# Patient Record
Sex: FEMALE | Race: WHITE | NOT HISPANIC OR LATINO | Employment: OTHER | URBAN - METROPOLITAN AREA
[De-identification: names, ages, dates, MRNs, and addresses within clinical notes are randomized per-mention and may not be internally consistent; named-entity substitution may affect disease eponyms.]

---

## 2020-07-21 ENCOUNTER — TRANSCRIBE ORDERS (OUTPATIENT)
Dept: ADMINISTRATIVE | Facility: HOSPITAL | Age: 70
End: 2020-07-21

## 2020-07-21 ENCOUNTER — APPOINTMENT (OUTPATIENT)
Dept: LAB | Facility: MEDICAL CENTER | Age: 70
End: 2020-07-21
Payer: MEDICARE

## 2020-07-21 DIAGNOSIS — R35.1 NOCTURIA: Primary | ICD-10-CM

## 2020-07-21 LAB
BACTERIA UR QL AUTO: ABNORMAL /HPF
BILIRUB UR QL STRIP: NEGATIVE
CLARITY UR: CLEAR
COLOR UR: YELLOW
GLUCOSE UR STRIP-MCNC: NEGATIVE MG/DL
HGB UR QL STRIP.AUTO: NEGATIVE
KETONES UR STRIP-MCNC: NEGATIVE MG/DL
LEUKOCYTE ESTERASE UR QL STRIP: NEGATIVE
MUCOUS THREADS UR QL AUTO: ABNORMAL
NITRITE UR QL STRIP: NEGATIVE
NON-SQ EPI CELLS URNS QL MICRO: ABNORMAL /HPF
PH UR STRIP.AUTO: 5 [PH]
PROT UR STRIP-MCNC: ABNORMAL MG/DL
RBC #/AREA URNS AUTO: ABNORMAL /HPF
SP GR UR STRIP.AUTO: 1.01 (ref 1–1.04)
UROBILINOGEN UA: NEGATIVE MG/DL
WBC #/AREA URNS AUTO: ABNORMAL /HPF

## 2020-07-21 PROCEDURE — 87086 URINE CULTURE/COLONY COUNT: CPT | Performed by: INTERNAL MEDICINE

## 2020-07-21 PROCEDURE — 81001 URINALYSIS AUTO W/SCOPE: CPT

## 2020-07-22 LAB — BACTERIA UR CULT: NORMAL

## 2021-02-26 ENCOUNTER — IMMUNIZATIONS (OUTPATIENT)
Dept: FAMILY MEDICINE CLINIC | Facility: HOSPITAL | Age: 71
End: 2021-02-26

## 2021-02-26 DIAGNOSIS — Z23 ENCOUNTER FOR IMMUNIZATION: Primary | ICD-10-CM

## 2021-02-26 PROCEDURE — 0001A SARS-COV-2 / COVID-19 MRNA VACCINE (PFIZER-BIONTECH) 30 MCG: CPT

## 2021-02-26 PROCEDURE — 91300 SARS-COV-2 / COVID-19 MRNA VACCINE (PFIZER-BIONTECH) 30 MCG: CPT

## 2021-03-17 ENCOUNTER — IMMUNIZATIONS (OUTPATIENT)
Dept: FAMILY MEDICINE CLINIC | Facility: HOSPITAL | Age: 71
End: 2021-03-17

## 2021-03-17 DIAGNOSIS — Z23 ENCOUNTER FOR IMMUNIZATION: Primary | ICD-10-CM

## 2021-03-17 PROCEDURE — 91300 SARS-COV-2 / COVID-19 MRNA VACCINE (PFIZER-BIONTECH) 30 MCG: CPT

## 2021-03-17 PROCEDURE — 0002A SARS-COV-2 / COVID-19 MRNA VACCINE (PFIZER-BIONTECH) 30 MCG: CPT

## 2022-01-10 ENCOUNTER — NURSE TRIAGE (OUTPATIENT)
Dept: OTHER | Facility: OTHER | Age: 72
End: 2022-01-10

## 2022-01-10 NOTE — TELEPHONE ENCOUNTER
Regarding: DId covid test 3 weeks ago it was negative started having a sore throat and a ear ache   ----- Message from Yomi Santana sent at 1/8/2022  8:19 PM EST -----  '' I did a covid tested 3 weeks ago and it was negative on thursday I started to have a sore throat and a ear ache should I get a new covid test or should I schedule an appointment to see my doctor ''

## 2022-01-10 NOTE — TELEPHONE ENCOUNTER
Reason for Disposition   Message left on unidentified voice mail  Phone number verified  Protocols used: NO CONTACT OR DUPLICATE CONTACT CALL-ADULT-[place]  Left voice message on machine to return my call to Health Call at [phone] option #7 if assistance is still needed

## 2023-08-28 ENCOUNTER — NEW PATIENT (OUTPATIENT)
Dept: URBAN - METROPOLITAN AREA CLINIC 6 | Facility: CLINIC | Age: 73
End: 2023-08-28

## 2023-08-28 DIAGNOSIS — E11.9: ICD-10-CM

## 2023-08-28 DIAGNOSIS — H25.813: ICD-10-CM

## 2023-08-28 DIAGNOSIS — H02.884: ICD-10-CM

## 2023-08-28 DIAGNOSIS — H02.881: ICD-10-CM

## 2023-08-28 PROCEDURE — 99204 OFFICE O/P NEW MOD 45 MIN: CPT

## 2023-08-28 ASSESSMENT — VISUAL ACUITY
OD_CC: 20/200+1
OU_CC: J10
OS_CC: CF 4FT

## 2023-08-28 ASSESSMENT — TONOMETRY
OD_IOP_MMHG: 17
OS_IOP_MMHG: 15

## 2023-09-11 ENCOUNTER — PRE-OP CATARACT MEASUREMENTS (OUTPATIENT)
Dept: URBAN - METROPOLITAN AREA CLINIC 6 | Facility: CLINIC | Age: 73
End: 2023-09-11

## 2023-09-11 DIAGNOSIS — H02.884: ICD-10-CM

## 2023-09-11 DIAGNOSIS — E11.9: ICD-10-CM

## 2023-09-11 DIAGNOSIS — H02.881: ICD-10-CM

## 2023-09-11 DIAGNOSIS — H25.813: ICD-10-CM

## 2023-09-11 PROCEDURE — 92136 OPHTHALMIC BIOMETRY: CPT

## 2023-09-11 PROCEDURE — 92012 INTRM OPH EXAM EST PATIENT: CPT

## 2023-09-11 ASSESSMENT — VISUAL ACUITY
OD_SC: 20/150
OS_SC: 20/200

## 2023-09-11 ASSESSMENT — TONOMETRY
OS_IOP_MMHG: 12
OD_IOP_MMHG: 16

## 2023-09-28 ENCOUNTER — SURGERY/PROCEDURE (OUTPATIENT)
Dept: URBAN - METROPOLITAN AREA SURGICAL CENTER 6 | Facility: SURGICAL CENTER | Age: 73
End: 2023-09-28

## 2023-09-28 DIAGNOSIS — H25.812: ICD-10-CM

## 2023-09-28 PROCEDURE — 66984 XCAPSL CTRC RMVL W/O ECP: CPT

## 2023-09-29 ENCOUNTER — 1 DAY POST-OP (OUTPATIENT)
Dept: URBAN - METROPOLITAN AREA CLINIC 6 | Facility: CLINIC | Age: 73
End: 2023-09-29

## 2023-09-29 DIAGNOSIS — Z96.1: ICD-10-CM

## 2023-09-29 PROCEDURE — 99024 POSTOP FOLLOW-UP VISIT: CPT

## 2023-09-29 ASSESSMENT — VISUAL ACUITY
OD_SC: 20/200
OS_SC: 20/200+1

## 2023-09-29 ASSESSMENT — TONOMETRY
OS_IOP_MMHG: 14
OD_IOP_MMHG: 14

## 2023-10-04 ENCOUNTER — 1 WEEK POST-OP (OUTPATIENT)
Dept: URBAN - METROPOLITAN AREA CLINIC 6 | Facility: CLINIC | Age: 73
End: 2023-10-04

## 2023-10-04 DIAGNOSIS — H25.811: ICD-10-CM

## 2023-10-04 DIAGNOSIS — Z96.1: ICD-10-CM

## 2023-10-04 PROCEDURE — 92136 OPHTHALMIC BIOMETRY: CPT | Mod: 26,RT

## 2023-10-04 PROCEDURE — 99024 POSTOP FOLLOW-UP VISIT: CPT

## 2023-10-04 ASSESSMENT — VISUAL ACUITY
OS_SC: 20/40-2
OD_PH: 20/150
OD_SC: 20/250

## 2023-10-17 ENCOUNTER — SURGERY/PROCEDURE (OUTPATIENT)
Dept: URBAN - METROPOLITAN AREA SURGICAL CENTER 6 | Facility: SURGICAL CENTER | Age: 73
End: 2023-10-17

## 2023-10-17 DIAGNOSIS — H25.811: ICD-10-CM

## 2023-10-17 PROCEDURE — 66984 XCAPSL CTRC RMVL W/O ECP: CPT | Mod: 79,RT

## 2023-10-18 ENCOUNTER — 1 DAY POST-OP (OUTPATIENT)
Dept: URBAN - METROPOLITAN AREA CLINIC 6 | Facility: CLINIC | Age: 73
End: 2023-10-18

## 2023-10-18 DIAGNOSIS — Z96.1: ICD-10-CM

## 2023-10-18 PROCEDURE — 99024 POSTOP FOLLOW-UP VISIT: CPT

## 2023-10-18 ASSESSMENT — TONOMETRY
OD_IOP_MMHG: 22
OS_IOP_MMHG: 13

## 2023-10-18 ASSESSMENT — VISUAL ACUITY
OS_SC: 20/40+2
OS_PH: 20/30
OD_SC: 20/25-1

## 2023-10-24 ENCOUNTER — 1 WEEK POST-OP (OUTPATIENT)
Dept: URBAN - METROPOLITAN AREA CLINIC 6 | Facility: CLINIC | Age: 73
End: 2023-10-24

## 2023-10-24 DIAGNOSIS — Z96.1: ICD-10-CM

## 2023-10-24 PROCEDURE — 99024 POSTOP FOLLOW-UP VISIT: CPT

## 2023-10-24 ASSESSMENT — TONOMETRY
OD_IOP_MMHG: 14
OS_IOP_MMHG: 10

## 2023-10-24 ASSESSMENT — VISUAL ACUITY
OS_PH: 20/30
OS_SC: 20/40
OD_SC: 20/25

## 2024-09-23 ENCOUNTER — HOSPITAL ENCOUNTER (INPATIENT)
Facility: HOSPITAL | Age: 74
LOS: 4 days | Discharge: NURSING, REHAB OR LONG-TERM CARE | DRG: 481 | End: 2024-09-27
Attending: EMERGENCY MEDICINE | Admitting: INTERNAL MEDICINE
Payer: COMMERCIAL

## 2024-09-23 ENCOUNTER — APPOINTMENT (EMERGENCY)
Dept: RADIOLOGY | Facility: HOSPITAL | Age: 74
DRG: 481 | End: 2024-09-23
Payer: COMMERCIAL
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
## 2024-09-23 DIAGNOSIS — S72.009A HIP FRACTURE (HCC): Primary | ICD-10-CM

## 2024-09-23 DIAGNOSIS — N17.9 AKI (ACUTE KIDNEY INJURY) (HCC): ICD-10-CM

## 2024-09-23 PROBLEM — E78.2 MIXED HYPERLIPIDEMIA: Status: ACTIVE | Noted: 2024-09-23

## 2024-09-23 PROBLEM — S72.001A FRACTURE OF NECK OF RIGHT FEMUR (HCC): Status: ACTIVE | Noted: 2024-09-23

## 2024-09-23 PROBLEM — E11.9 TYPE 2 DIABETES MELLITUS, WITHOUT LONG-TERM CURRENT USE OF INSULIN (HCC): Status: ACTIVE | Noted: 2024-09-23

## 2024-09-23 PROBLEM — E66.812 CLASS 2 OBESITY DUE TO EXCESS CALORIES IN ADULT: Status: ACTIVE | Noted: 2024-09-23

## 2024-09-23 PROBLEM — N18.4 STAGE 4 CHRONIC KIDNEY DISEASE (HCC): Status: ACTIVE | Noted: 2024-09-23

## 2024-09-23 PROBLEM — I10 PRIMARY HYPERTENSION: Status: ACTIVE | Noted: 2024-09-23

## 2024-09-23 PROBLEM — E66.09 CLASS 2 OBESITY DUE TO EXCESS CALORIES IN ADULT: Status: ACTIVE | Noted: 2024-09-23

## 2024-09-23 LAB
ABO GROUP BLD: NORMAL
ANION GAP SERPL CALCULATED.3IONS-SCNC: 8 MMOL/L (ref 4–13)
APTT PPP: 28 SECONDS (ref 23–34)
BASOPHILS # BLD AUTO: 0.03 THOUSANDS/ΜL (ref 0–0.1)
BASOPHILS NFR BLD AUTO: 0 % (ref 0–1)
BLD GP AB SCN SERPL QL: NEGATIVE
BUN SERPL-MCNC: 43 MG/DL (ref 5–25)
CALCIUM SERPL-MCNC: 9.7 MG/DL (ref 8.4–10.2)
CHLORIDE SERPL-SCNC: 108 MMOL/L (ref 96–108)
CO2 SERPL-SCNC: 21 MMOL/L (ref 21–32)
CREAT SERPL-MCNC: 1.79 MG/DL (ref 0.6–1.3)
EOSINOPHIL # BLD AUTO: 0.15 THOUSAND/ΜL (ref 0–0.61)
EOSINOPHIL NFR BLD AUTO: 1 % (ref 0–6)
ERYTHROCYTE [DISTWIDTH] IN BLOOD BY AUTOMATED COUNT: 14.1 % (ref 11.6–15.1)
GFR SERPL CREATININE-BSD FRML MDRD: 27 ML/MIN/1.73SQ M
GLUCOSE SERPL-MCNC: 128 MG/DL (ref 65–140)
HCT VFR BLD AUTO: 34.1 % (ref 34.8–46.1)
HGB BLD-MCNC: 10.8 G/DL (ref 11.5–15.4)
IMM GRANULOCYTES # BLD AUTO: 0.04 THOUSAND/UL (ref 0–0.2)
IMM GRANULOCYTES NFR BLD AUTO: 0 % (ref 0–2)
INR PPP: 1.15 (ref 0.85–1.19)
LYMPHOCYTES # BLD AUTO: 1.49 THOUSANDS/ΜL (ref 0.6–4.47)
LYMPHOCYTES NFR BLD AUTO: 13 % (ref 14–44)
MCH RBC QN AUTO: 29.8 PG (ref 26.8–34.3)
MCHC RBC AUTO-ENTMCNC: 31.7 G/DL (ref 31.4–37.4)
MCV RBC AUTO: 94 FL (ref 82–98)
MONOCYTES # BLD AUTO: 0.61 THOUSAND/ΜL (ref 0.17–1.22)
MONOCYTES NFR BLD AUTO: 6 % (ref 4–12)
NEUTROPHILS # BLD AUTO: 8.77 THOUSANDS/ΜL (ref 1.85–7.62)
NEUTS SEG NFR BLD AUTO: 80 % (ref 43–75)
NRBC BLD AUTO-RTO: 0 /100 WBCS
PLATELET # BLD AUTO: 237 THOUSANDS/UL (ref 149–390)
PMV BLD AUTO: 9.4 FL (ref 8.9–12.7)
POTASSIUM SERPL-SCNC: 5 MMOL/L (ref 3.5–5.3)
PROTHROMBIN TIME: 15.1 SECONDS (ref 12.3–15)
RBC # BLD AUTO: 3.63 MILLION/UL (ref 3.81–5.12)
RH BLD: POSITIVE
SODIUM SERPL-SCNC: 137 MMOL/L (ref 135–147)
SPECIMEN EXPIRATION DATE: NORMAL
TSH SERPL DL<=0.05 MIU/L-ACNC: 5.63 UIU/ML (ref 0.45–4.5)
WBC # BLD AUTO: 11.09 THOUSAND/UL (ref 4.31–10.16)

## 2024-09-23 PROCEDURE — 86850 RBC ANTIBODY SCREEN: CPT | Performed by: EMERGENCY MEDICINE

## 2024-09-23 PROCEDURE — 86900 BLOOD TYPING SEROLOGIC ABO: CPT | Performed by: EMERGENCY MEDICINE

## 2024-09-23 PROCEDURE — 36415 COLL VENOUS BLD VENIPUNCTURE: CPT | Performed by: EMERGENCY MEDICINE

## 2024-09-23 PROCEDURE — 73552 X-RAY EXAM OF FEMUR 2/>: CPT

## 2024-09-23 PROCEDURE — 85730 THROMBOPLASTIN TIME PARTIAL: CPT | Performed by: EMERGENCY MEDICINE

## 2024-09-23 PROCEDURE — 83036 HEMOGLOBIN GLYCOSYLATED A1C: CPT | Performed by: INTERNAL MEDICINE

## 2024-09-23 PROCEDURE — 99284 EMERGENCY DEPT VISIT MOD MDM: CPT

## 2024-09-23 PROCEDURE — 99285 EMERGENCY DEPT VISIT HI MDM: CPT | Performed by: EMERGENCY MEDICINE

## 2024-09-23 PROCEDURE — 86901 BLOOD TYPING SEROLOGIC RH(D): CPT | Performed by: EMERGENCY MEDICINE

## 2024-09-23 PROCEDURE — 85025 COMPLETE CBC W/AUTO DIFF WBC: CPT | Performed by: EMERGENCY MEDICINE

## 2024-09-23 PROCEDURE — 73502 X-RAY EXAM HIP UNI 2-3 VIEWS: CPT

## 2024-09-23 PROCEDURE — 99222 1ST HOSP IP/OBS MODERATE 55: CPT | Performed by: INTERNAL MEDICINE

## 2024-09-23 PROCEDURE — 71045 X-RAY EXAM CHEST 1 VIEW: CPT

## 2024-09-23 PROCEDURE — 93005 ELECTROCARDIOGRAM TRACING: CPT

## 2024-09-23 PROCEDURE — 85610 PROTHROMBIN TIME: CPT | Performed by: EMERGENCY MEDICINE

## 2024-09-23 PROCEDURE — 84443 ASSAY THYROID STIM HORMONE: CPT | Performed by: INTERNAL MEDICINE

## 2024-09-23 PROCEDURE — 80048 BASIC METABOLIC PNL TOTAL CA: CPT | Performed by: EMERGENCY MEDICINE

## 2024-09-23 RX ORDER — OXYCODONE HYDROCHLORIDE 5 MG/1
5 TABLET ORAL ONCE
Status: DISCONTINUED | OUTPATIENT
Start: 2024-09-23 | End: 2024-09-24

## 2024-09-23 RX ORDER — HYDROMORPHONE HCL IN WATER/PF 6 MG/30 ML
0.2 PATIENT CONTROLLED ANALGESIA SYRINGE INTRAVENOUS
Status: DISCONTINUED | OUTPATIENT
Start: 2024-09-23 | End: 2024-09-27 | Stop reason: HOSPADM

## 2024-09-23 RX ORDER — OXYCODONE HYDROCHLORIDE 5 MG/1
5 TABLET ORAL EVERY 4 HOURS PRN
Status: DISCONTINUED | OUTPATIENT
Start: 2024-09-23 | End: 2024-09-27 | Stop reason: HOSPADM

## 2024-09-23 RX ORDER — HEPARIN SODIUM 5000 [USP'U]/ML
5000 INJECTION, SOLUTION INTRAVENOUS; SUBCUTANEOUS EVERY 8 HOURS SCHEDULED
Status: DISCONTINUED | OUTPATIENT
Start: 2024-09-23 | End: 2024-09-27 | Stop reason: HOSPADM

## 2024-09-23 RX ORDER — OXYCODONE AND ACETAMINOPHEN 5; 325 MG/1; MG/1
1 TABLET ORAL ONCE
Status: DISCONTINUED | OUTPATIENT
Start: 2024-09-23 | End: 2024-09-23

## 2024-09-23 RX ORDER — DOCUSATE SODIUM 100 MG/1
100 CAPSULE, LIQUID FILLED ORAL 2 TIMES DAILY
Status: DISCONTINUED | OUTPATIENT
Start: 2024-09-24 | End: 2024-09-25

## 2024-09-23 RX ORDER — METHOCARBAMOL 500 MG/1
500 TABLET, FILM COATED ORAL ONCE
Status: COMPLETED | OUTPATIENT
Start: 2024-09-23 | End: 2024-09-23

## 2024-09-23 RX ORDER — ACETAMINOPHEN 325 MG/1
650 TABLET ORAL EVERY 6 HOURS PRN
Status: DISCONTINUED | OUTPATIENT
Start: 2024-09-23 | End: 2024-09-27 | Stop reason: HOSPADM

## 2024-09-23 RX ORDER — INSULIN LISPRO 100 [IU]/ML
1-5 INJECTION, SOLUTION INTRAVENOUS; SUBCUTANEOUS
Status: DISCONTINUED | OUTPATIENT
Start: 2024-09-24 | End: 2024-09-24

## 2024-09-23 RX ORDER — LIDOCAINE 50 MG/G
1 PATCH TOPICAL ONCE
Status: COMPLETED | OUTPATIENT
Start: 2024-09-23 | End: 2024-09-24

## 2024-09-23 RX ORDER — INSULIN LISPRO 100 [IU]/ML
1-5 INJECTION, SOLUTION INTRAVENOUS; SUBCUTANEOUS
Status: DISCONTINUED | OUTPATIENT
Start: 2024-09-23 | End: 2024-09-27 | Stop reason: HOSPADM

## 2024-09-23 RX ORDER — TRANEXAMIC ACID 100 MG/ML
INJECTION, SOLUTION INTRAVENOUS
Status: DISPENSED
Start: 2024-09-23 | End: 2024-09-24

## 2024-09-23 RX ORDER — AMLODIPINE BESYLATE 5 MG/1
5 TABLET ORAL DAILY
Status: DISCONTINUED | OUTPATIENT
Start: 2024-09-24 | End: 2024-09-26

## 2024-09-23 RX ORDER — ONDANSETRON 2 MG/ML
4 INJECTION INTRAMUSCULAR; INTRAVENOUS EVERY 6 HOURS PRN
Status: DISCONTINUED | OUTPATIENT
Start: 2024-09-23 | End: 2024-09-27 | Stop reason: HOSPADM

## 2024-09-23 RX ADMIN — METHOCARBAMOL TABLETS 500 MG: 500 TABLET, COATED ORAL at 22:11

## 2024-09-23 RX ADMIN — LIDOCAINE 1 PATCH: 50 PATCH CUTANEOUS at 22:12

## 2024-09-23 RX ADMIN — MORPHINE SULFATE 2 MG: 2 INJECTION, SOLUTION INTRAMUSCULAR; INTRAVENOUS at 23:00

## 2024-09-23 RX ADMIN — TRANEXAMIC ACID 690 MG: 1 INJECTION, SOLUTION INTRAVENOUS at 23:47

## 2024-09-23 NOTE — Clinical Note
Case was discussed with dr jain and   and the patient's admission status was agreed to be Admission Status: inpatient status to the service of Dr. Hill .

## 2024-09-24 ENCOUNTER — APPOINTMENT (INPATIENT)
Dept: RADIOLOGY | Facility: HOSPITAL | Age: 74
DRG: 481 | End: 2024-09-24
Payer: COMMERCIAL

## 2024-09-24 ENCOUNTER — ANESTHESIA (INPATIENT)
Dept: PERIOP | Facility: HOSPITAL | Age: 74
DRG: 481 | End: 2024-09-24
Payer: COMMERCIAL

## 2024-09-24 ENCOUNTER — ANESTHESIA EVENT (INPATIENT)
Dept: PERIOP | Facility: HOSPITAL | Age: 74
DRG: 481 | End: 2024-09-24
Payer: COMMERCIAL

## 2024-09-24 PROBLEM — M20.12 ACQUIRED HALLUX VALGUS OF BOTH FEET: Status: ACTIVE | Noted: 2020-09-08

## 2024-09-24 PROBLEM — M21.40 PES PLANUS: Status: ACTIVE | Noted: 2020-09-08

## 2024-09-24 PROBLEM — M19.072: Status: ACTIVE | Noted: 2020-09-08

## 2024-09-24 PROBLEM — M20.11 ACQUIRED HALLUX VALGUS OF BOTH FEET: Status: ACTIVE | Noted: 2020-09-08

## 2024-09-24 PROBLEM — I87.2 VENOUS INSUFFICIENCY: Status: ACTIVE | Noted: 2020-09-08

## 2024-09-24 PROBLEM — N39.3 STRESS INCONTINENCE OF URINE: Status: ACTIVE | Noted: 2023-07-12

## 2024-09-24 PROBLEM — G72.0 STATIN MYOPATHY: Status: ACTIVE | Noted: 2024-04-29

## 2024-09-24 PROBLEM — E87.5 HYPERKALEMIA: Chronic | Status: ACTIVE | Noted: 2022-12-07

## 2024-09-24 PROBLEM — T46.6X5A STATIN MYOPATHY: Status: ACTIVE | Noted: 2024-04-29

## 2024-09-24 LAB
ABO GROUP BLD: NORMAL
ALBUMIN SERPL BCG-MCNC: 3.6 G/DL (ref 3.5–5)
ALP SERPL-CCNC: 74 U/L (ref 34–104)
ALT SERPL W P-5'-P-CCNC: 20 U/L (ref 7–52)
ANION GAP SERPL CALCULATED.3IONS-SCNC: 8 MMOL/L (ref 4–13)
AST SERPL W P-5'-P-CCNC: 35 U/L (ref 13–39)
ATRIAL RATE: 76 BPM
BILIRUB SERPL-MCNC: 0.3 MG/DL (ref 0.2–1)
BUN SERPL-MCNC: 43 MG/DL (ref 5–25)
CALCIUM SERPL-MCNC: 9.5 MG/DL (ref 8.4–10.2)
CHLORIDE SERPL-SCNC: 110 MMOL/L (ref 96–108)
CO2 SERPL-SCNC: 20 MMOL/L (ref 21–32)
CREAT SERPL-MCNC: 1.87 MG/DL (ref 0.6–1.3)
ERYTHROCYTE [DISTWIDTH] IN BLOOD BY AUTOMATED COUNT: 14.2 % (ref 11.6–15.1)
EST. AVERAGE GLUCOSE BLD GHB EST-MCNC: 148 MG/DL
GFR SERPL CREATININE-BSD FRML MDRD: 25 ML/MIN/1.73SQ M
GLUCOSE SERPL-MCNC: 134 MG/DL (ref 65–140)
GLUCOSE SERPL-MCNC: 141 MG/DL (ref 65–140)
GLUCOSE SERPL-MCNC: 197 MG/DL (ref 65–140)
GLUCOSE SERPL-MCNC: 85 MG/DL (ref 65–140)
GLUCOSE SERPL-MCNC: 91 MG/DL (ref 65–140)
GLUCOSE SERPL-MCNC: 92 MG/DL (ref 65–140)
HBA1C MFR BLD: 6.8 %
HCT VFR BLD AUTO: 31.4 % (ref 34.8–46.1)
HGB BLD-MCNC: 9.7 G/DL (ref 11.5–15.4)
MAGNESIUM SERPL-MCNC: 2 MG/DL (ref 1.9–2.7)
MCH RBC QN AUTO: 29.9 PG (ref 26.8–34.3)
MCHC RBC AUTO-ENTMCNC: 30.9 G/DL (ref 31.4–37.4)
MCV RBC AUTO: 97 FL (ref 82–98)
P AXIS: 48 DEGREES
PLATELET # BLD AUTO: 218 THOUSANDS/UL (ref 149–390)
PMV BLD AUTO: 9.7 FL (ref 8.9–12.7)
POTASSIUM SERPL-SCNC: 5.4 MMOL/L (ref 3.5–5.3)
PR INTERVAL: 134 MS
PROT SERPL-MCNC: 6.6 G/DL (ref 6.4–8.4)
QRS AXIS: -4 DEGREES
QRSD INTERVAL: 78 MS
QT INTERVAL: 380 MS
QTC INTERVAL: 427 MS
RBC # BLD AUTO: 3.24 MILLION/UL (ref 3.81–5.12)
RH BLD: POSITIVE
SODIUM SERPL-SCNC: 138 MMOL/L (ref 135–147)
T WAVE AXIS: 94 DEGREES
VENTRICULAR RATE: 76 BPM
WBC # BLD AUTO: 7.47 THOUSAND/UL (ref 4.31–10.16)

## 2024-09-24 PROCEDURE — C1713 ANCHOR/SCREW BN/BN,TIS/BN: HCPCS | Performed by: ORTHOPAEDIC SURGERY

## 2024-09-24 PROCEDURE — 93010 ELECTROCARDIOGRAM REPORT: CPT | Performed by: INTERNAL MEDICINE

## 2024-09-24 PROCEDURE — 82948 REAGENT STRIP/BLOOD GLUCOSE: CPT

## 2024-09-24 PROCEDURE — 85027 COMPLETE CBC AUTOMATED: CPT | Performed by: INTERNAL MEDICINE

## 2024-09-24 PROCEDURE — 27245 TREAT THIGH FRACTURE: CPT | Performed by: PHYSICIAN ASSISTANT

## 2024-09-24 PROCEDURE — 27245 TREAT THIGH FRACTURE: CPT | Performed by: ORTHOPAEDIC SURGERY

## 2024-09-24 PROCEDURE — 99222 1ST HOSP IP/OBS MODERATE 55: CPT | Performed by: ORTHOPAEDIC SURGERY

## 2024-09-24 PROCEDURE — 73552 X-RAY EXAM OF FEMUR 2/>: CPT

## 2024-09-24 PROCEDURE — 83735 ASSAY OF MAGNESIUM: CPT | Performed by: INTERNAL MEDICINE

## 2024-09-24 PROCEDURE — 0QS606Z REPOSITION RIGHT UPPER FEMUR WITH INTRAMEDULLARY INTERNAL FIXATION DEVICE, OPEN APPROACH: ICD-10-PCS | Performed by: ORTHOPAEDIC SURGERY

## 2024-09-24 PROCEDURE — 99232 SBSQ HOSP IP/OBS MODERATE 35: CPT | Performed by: STUDENT IN AN ORGANIZED HEALTH CARE EDUCATION/TRAINING PROGRAM

## 2024-09-24 PROCEDURE — 80053 COMPREHEN METABOLIC PANEL: CPT | Performed by: INTERNAL MEDICINE

## 2024-09-24 DEVICE — TROCHANTERIC NAIL KIT, TI
Type: IMPLANTABLE DEVICE | Site: FEMUR | Status: FUNCTIONAL
Brand: GAMMA

## 2024-09-24 DEVICE — LOCKING SCREW, FULLY THREADED: Type: IMPLANTABLE DEVICE | Site: FEMUR | Status: FUNCTIONAL

## 2024-09-24 DEVICE — LAG SCREW, TI
Type: IMPLANTABLE DEVICE | Site: FEMUR | Status: FUNCTIONAL
Brand: GAMMA

## 2024-09-24 RX ORDER — MIDAZOLAM HYDROCHLORIDE 2 MG/2ML
INJECTION, SOLUTION INTRAMUSCULAR; INTRAVENOUS AS NEEDED
Status: DISCONTINUED | OUTPATIENT
Start: 2024-09-24 | End: 2024-09-24

## 2024-09-24 RX ORDER — CHLORHEXIDINE GLUCONATE ORAL RINSE 1.2 MG/ML
15 SOLUTION DENTAL ONCE
Status: COMPLETED | OUTPATIENT
Start: 2024-09-24 | End: 2024-09-24

## 2024-09-24 RX ORDER — MAGNESIUM HYDROXIDE 1200 MG/15ML
LIQUID ORAL AS NEEDED
Status: DISCONTINUED | OUTPATIENT
Start: 2024-09-24 | End: 2024-09-24 | Stop reason: HOSPADM

## 2024-09-24 RX ORDER — ONDANSETRON 2 MG/ML
INJECTION INTRAMUSCULAR; INTRAVENOUS AS NEEDED
Status: DISCONTINUED | OUTPATIENT
Start: 2024-09-24 | End: 2024-09-24

## 2024-09-24 RX ORDER — CEFAZOLIN SODIUM 2 G/50ML
2000 SOLUTION INTRAVENOUS ONCE
Status: COMPLETED | OUTPATIENT
Start: 2024-09-24 | End: 2024-09-24

## 2024-09-24 RX ORDER — INSULIN LISPRO 100 [IU]/ML
1-5 INJECTION, SOLUTION INTRAVENOUS; SUBCUTANEOUS
Status: DISCONTINUED | OUTPATIENT
Start: 2024-09-25 | End: 2024-09-27 | Stop reason: HOSPADM

## 2024-09-24 RX ORDER — ROCURONIUM BROMIDE 10 MG/ML
INJECTION, SOLUTION INTRAVENOUS AS NEEDED
Status: DISCONTINUED | OUTPATIENT
Start: 2024-09-24 | End: 2024-09-24

## 2024-09-24 RX ORDER — ACETAMINOPHEN 10 MG/ML
1000 INJECTION, SOLUTION INTRAVENOUS ONCE
Status: COMPLETED | OUTPATIENT
Start: 2024-09-24 | End: 2024-09-24

## 2024-09-24 RX ORDER — FUROSEMIDE 10 MG/ML
INJECTION INTRAMUSCULAR; INTRAVENOUS AS NEEDED
Status: DISCONTINUED | OUTPATIENT
Start: 2024-09-24 | End: 2024-09-24

## 2024-09-24 RX ORDER — DEXAMETHASONE SODIUM PHOSPHATE 10 MG/ML
INJECTION, SOLUTION INTRAMUSCULAR; INTRAVENOUS AS NEEDED
Status: DISCONTINUED | OUTPATIENT
Start: 2024-09-24 | End: 2024-09-24

## 2024-09-24 RX ORDER — FENTANYL CITRATE/PF 50 MCG/ML
50 SYRINGE (ML) INJECTION
Status: DISCONTINUED | OUTPATIENT
Start: 2024-09-24 | End: 2024-09-24 | Stop reason: HOSPADM

## 2024-09-24 RX ORDER — PROPOFOL 10 MG/ML
INJECTION, EMULSION INTRAVENOUS AS NEEDED
Status: DISCONTINUED | OUTPATIENT
Start: 2024-09-24 | End: 2024-09-24

## 2024-09-24 RX ORDER — CEFAZOLIN SODIUM 1 G/50ML
1000 SOLUTION INTRAVENOUS EVERY 8 HOURS
Status: DISCONTINUED | OUTPATIENT
Start: 2024-09-25 | End: 2024-09-25

## 2024-09-24 RX ORDER — LIDOCAINE HYDROCHLORIDE 10 MG/ML
INJECTION, SOLUTION EPIDURAL; INFILTRATION; INTRACAUDAL; PERINEURAL AS NEEDED
Status: DISCONTINUED | OUTPATIENT
Start: 2024-09-24 | End: 2024-09-24

## 2024-09-24 RX ORDER — GLIMEPIRIDE 2 MG/1
2 TABLET ORAL 2 TIMES DAILY
COMMUNITY

## 2024-09-24 RX ORDER — ACETAMINOPHEN 160 MG
2000 TABLET,DISINTEGRATING ORAL DAILY
COMMUNITY
Start: 2024-08-12

## 2024-09-24 RX ORDER — HYDROMORPHONE HCL/PF 1 MG/ML
0.5 SYRINGE (ML) INJECTION
Status: DISCONTINUED | OUTPATIENT
Start: 2024-09-24 | End: 2024-09-24 | Stop reason: HOSPADM

## 2024-09-24 RX ORDER — ONDANSETRON 2 MG/ML
4 INJECTION INTRAMUSCULAR; INTRAVENOUS ONCE AS NEEDED
Status: COMPLETED | OUTPATIENT
Start: 2024-09-24 | End: 2024-09-24

## 2024-09-24 RX ORDER — HYDROMORPHONE HCL/PF 1 MG/ML
SYRINGE (ML) INJECTION AS NEEDED
Status: DISCONTINUED | OUTPATIENT
Start: 2024-09-24 | End: 2024-09-24

## 2024-09-24 RX ORDER — IPRATROPIUM BROMIDE AND ALBUTEROL SULFATE 2.5; .5 MG/3ML; MG/3ML
3 SOLUTION RESPIRATORY (INHALATION) ONCE
Status: COMPLETED | OUTPATIENT
Start: 2024-09-24 | End: 2024-09-24

## 2024-09-24 RX ORDER — PIOGLITAZONEHYDROCHLORIDE 30 MG/1
30 TABLET ORAL DAILY
COMMUNITY

## 2024-09-24 RX ORDER — AMLODIPINE BESYLATE 5 MG/1
1 TABLET ORAL DAILY
COMMUNITY
Start: 2024-08-12

## 2024-09-24 RX ORDER — TRANEXAMIC ACID 10 MG/ML
1000 INJECTION, SOLUTION INTRAVENOUS ONCE
Status: COMPLETED | OUTPATIENT
Start: 2024-09-24 | End: 2024-09-24

## 2024-09-24 RX ORDER — FENTANYL CITRATE 50 UG/ML
INJECTION, SOLUTION INTRAMUSCULAR; INTRAVENOUS
Status: COMPLETED | OUTPATIENT
Start: 2024-09-24 | End: 2024-09-24

## 2024-09-24 RX ORDER — BUPIVACAINE HYDROCHLORIDE 2.5 MG/ML
INJECTION, SOLUTION EPIDURAL; INFILTRATION; INTRACAUDAL
Status: COMPLETED | OUTPATIENT
Start: 2024-09-24 | End: 2024-09-24

## 2024-09-24 RX ORDER — PHENYLEPHRINE HCL IN 0.9% NACL 1 MG/10 ML
SYRINGE (ML) INTRAVENOUS AS NEEDED
Status: DISCONTINUED | OUTPATIENT
Start: 2024-09-24 | End: 2024-09-24

## 2024-09-24 RX ORDER — METOCLOPRAMIDE HYDROCHLORIDE 5 MG/ML
10 INJECTION INTRAMUSCULAR; INTRAVENOUS ONCE AS NEEDED
Status: DISCONTINUED | OUTPATIENT
Start: 2024-09-24 | End: 2024-09-24 | Stop reason: HOSPADM

## 2024-09-24 RX ORDER — ASPIRIN 81 MG/1
81 TABLET ORAL DAILY
Status: ON HOLD | COMMUNITY
End: 2024-09-27

## 2024-09-24 RX ORDER — SODIUM CHLORIDE, SODIUM LACTATE, POTASSIUM CHLORIDE, CALCIUM CHLORIDE 600; 310; 30; 20 MG/100ML; MG/100ML; MG/100ML; MG/100ML
INJECTION, SOLUTION INTRAVENOUS CONTINUOUS PRN
Status: DISCONTINUED | OUTPATIENT
Start: 2024-09-24 | End: 2024-09-24

## 2024-09-24 RX ORDER — CEFAZOLIN SODIUM 1 G/50ML
1000 SOLUTION INTRAVENOUS EVERY 8 HOURS
Status: DISCONTINUED | OUTPATIENT
Start: 2024-09-24 | End: 2024-09-24

## 2024-09-24 RX ADMIN — SUGAMMADEX 200 MG: 100 INJECTION, SOLUTION INTRAVENOUS at 19:09

## 2024-09-24 RX ADMIN — SODIUM CHLORIDE, SODIUM LACTATE, POTASSIUM CHLORIDE, AND CALCIUM CHLORIDE: .6; .31; .03; .02 INJECTION, SOLUTION INTRAVENOUS at 17:00

## 2024-09-24 RX ADMIN — AMLODIPINE BESYLATE 5 MG: 5 TABLET ORAL at 09:25

## 2024-09-24 RX ADMIN — PROPOFOL 110 MCG/KG/MIN: 10 INJECTION, EMULSION INTRAVENOUS at 18:36

## 2024-09-24 RX ADMIN — HYDROMORPHONE HYDROCHLORIDE 0.2 MG: 0.2 INJECTION, SOLUTION INTRAMUSCULAR; INTRAVENOUS; SUBCUTANEOUS at 00:33

## 2024-09-24 RX ADMIN — BUPIVACAINE HYDROCHLORIDE 20 ML: 2.5 INJECTION, SOLUTION EPIDURAL; INFILTRATION; INTRACAUDAL; PERINEURAL at 17:45

## 2024-09-24 RX ADMIN — DOCUSATE SODIUM 100 MG: 100 CAPSULE, LIQUID FILLED ORAL at 09:25

## 2024-09-24 RX ADMIN — HYDROMORPHONE HYDROCHLORIDE 0.2 MG: 0.2 INJECTION, SOLUTION INTRAMUSCULAR; INTRAVENOUS; SUBCUTANEOUS at 09:38

## 2024-09-24 RX ADMIN — ACETAMINOPHEN 1000 MG: 10 INJECTION INTRAVENOUS at 20:12

## 2024-09-24 RX ADMIN — TRANEXAMIC ACID 1000 MG: 10 INJECTION, SOLUTION INTRAVENOUS at 18:19

## 2024-09-24 RX ADMIN — ROCURONIUM BROMIDE 50 MG: 10 INJECTION, SOLUTION INTRAVENOUS at 17:56

## 2024-09-24 RX ADMIN — FENTANYL CITRATE 75 MCG: 50 INJECTION, SOLUTION INTRAMUSCULAR; INTRAVENOUS at 17:45

## 2024-09-24 RX ADMIN — Medication 200 MCG: at 18:52

## 2024-09-24 RX ADMIN — CEFAZOLIN SODIUM 2000 MG: 2 SOLUTION INTRAVENOUS at 17:54

## 2024-09-24 RX ADMIN — ONDANSETRON 4 MG: 2 INJECTION INTRAMUSCULAR; INTRAVENOUS at 00:15

## 2024-09-24 RX ADMIN — PROPOFOL 50 MG: 10 INJECTION, EMULSION INTRAVENOUS at 18:00

## 2024-09-24 RX ADMIN — ONDANSETRON 4 MG: 2 INJECTION INTRAMUSCULAR; INTRAVENOUS at 19:51

## 2024-09-24 RX ADMIN — HYDROMORPHONE HYDROCHLORIDE 0.2 MG: 0.2 INJECTION, SOLUTION INTRAMUSCULAR; INTRAVENOUS; SUBCUTANEOUS at 15:25

## 2024-09-24 RX ADMIN — PROPOFOL 50 MG: 10 INJECTION, EMULSION INTRAVENOUS at 18:28

## 2024-09-24 RX ADMIN — HEPARIN SODIUM 5000 UNITS: 5000 INJECTION, SOLUTION INTRAVENOUS; SUBCUTANEOUS at 00:15

## 2024-09-24 RX ADMIN — Medication 100 MCG: at 18:45

## 2024-09-24 RX ADMIN — INSULIN LISPRO 1 UNITS: 100 INJECTION, SOLUTION INTRAVENOUS; SUBCUTANEOUS at 22:49

## 2024-09-24 RX ADMIN — PROPOFOL 50 MG: 10 INJECTION, EMULSION INTRAVENOUS at 18:35

## 2024-09-24 RX ADMIN — HYDROMORPHONE HYDROCHLORIDE 1 MG: 1 INJECTION, SOLUTION INTRAMUSCULAR; INTRAVENOUS; SUBCUTANEOUS at 18:10

## 2024-09-24 RX ADMIN — TRANEXAMIC ACID 1000 MG: 10 INJECTION, SOLUTION INTRAVENOUS at 18:56

## 2024-09-24 RX ADMIN — PROPOFOL 150 MG: 10 INJECTION, EMULSION INTRAVENOUS at 17:56

## 2024-09-24 RX ADMIN — ONDANSETRON 4 MG: 2 INJECTION INTRAMUSCULAR; INTRAVENOUS at 19:02

## 2024-09-24 RX ADMIN — LIDOCAINE HYDROCHLORIDE 50 MG: 10 INJECTION, SOLUTION EPIDURAL; INFILTRATION; INTRACAUDAL; PERINEURAL at 17:56

## 2024-09-24 RX ADMIN — FUROSEMIDE 20 MG: 10 INJECTION, SOLUTION INTRAMUSCULAR; INTRAVENOUS at 19:34

## 2024-09-24 RX ADMIN — FENTANYL CITRATE 25 MCG: 50 INJECTION, SOLUTION INTRAMUSCULAR; INTRAVENOUS at 18:01

## 2024-09-24 RX ADMIN — DEXAMETHASONE SODIUM PHOSPHATE 10 MG: 10 INJECTION, SOLUTION INTRAMUSCULAR; INTRAVENOUS at 18:01

## 2024-09-24 RX ADMIN — HYDROMORPHONE HYDROCHLORIDE 0.2 MG: 0.2 INJECTION, SOLUTION INTRAMUSCULAR; INTRAVENOUS; SUBCUTANEOUS at 06:06

## 2024-09-24 RX ADMIN — CHLORHEXIDINE GLUCONATE 0.12% ORAL RINSE 15 ML: 1.2 LIQUID ORAL at 15:44

## 2024-09-24 RX ADMIN — IPRATROPIUM BROMIDE AND ALBUTEROL SULFATE 3 ML: 2.5; .5 SOLUTION RESPIRATORY (INHALATION) at 19:42

## 2024-09-24 RX ADMIN — MIDAZOLAM HYDROCHLORIDE 2 MG: 1 INJECTION, SOLUTION INTRAMUSCULAR; INTRAVENOUS at 17:50

## 2024-09-24 RX ADMIN — DEXMEDETOMIDINE HYDROCHLORIDE 20 MCG: 100 INJECTION, SOLUTION INTRAVENOUS at 18:35

## 2024-09-24 NOTE — PLAN OF CARE
Problem: PAIN - ADULT  Goal: Verbalizes/displays adequate comfort level or baseline comfort level  Description: Interventions:  - Encourage patient to monitor pain and request assistance  - Assess pain using appropriate pain scale  - Administer analgesics based on type and severity of pain and evaluate response  - Implement non-pharmacological measures as appropriate and evaluate response  - Consider cultural and social influences on pain and pain management  - Notify physician/advanced practitioner if interventions unsuccessful or patient reports new pain  Outcome: Not Progressing     Problem: SAFETY ADULT  Goal: Patient will remain free of falls  Description: INTERVENTIONS:  - Educate patient/family on patient safety including physical limitations  - Instruct patient to call for assistance with activity   - Consult OT/PT to assist with strengthening/mobility   - Keep Call bell within reach  - Keep bed low and locked with side rails adjusted as appropriate  - Keep care items and personal belongings within reach  - Initiate and maintain comfort rounds  - Make Fall Risk Sign visible to staff  - Offer Toileting every prn Hours, in advance of need  - Initiate/Maintain bed alarm  - Obtain necessary fall risk management equipment: floor mat  - Apply yellow socks and bracelet for high fall risk patients  - Consider moving patient to room near nurses station  Outcome: Not Progressing  Goal: Maintain or return to baseline ADL function  Description: INTERVENTIONS:  -  Assess patient's ability to carry out ADLs; assess patient's baseline for ADL function and identify physical deficits which impact ability to perform ADLs (bathing, care of mouth/teeth, toileting, grooming, dressing, etc.)  - Assess/evaluate cause of self-care deficits   - Assess range of motion  - Assess patient's mobility; develop plan if impaired  - Assess patient's need for assistive devices and provide as appropriate  - Encourage maximum independence but  intervene and supervise when necessary  - Involve family in performance of ADLs  - Assess for home care needs following discharge   - Consider OT consult to assist with ADL evaluation and planning for discharge  - Provide patient education as appropriate  Outcome: Not Progressing  Goal: Maintains/Returns to pre admission functional level  Description: INTERVENTIONS:  - Perform AM-PAC 6 Click Basic Mobility/ Daily Activity assessment daily.  - Set and communicate daily mobility goal to care team and patient/family/caregiver.   - Collaborate with rehabilitation services on mobility goals if consulted  - Perform Range of Motion prn times a day.  - Reposition patient every prn hours.  - Dangle patient prn times a day  - Stand patient prn times a day  - Ambulate patient prn times a day  - Out of bed to chair prn times a day   - Out of bed for meals prn times a day  - Out of bed for toileting  - Record patient progress and toleration of activity level   Outcome: Not Progressing     Problem: DISCHARGE PLANNING  Goal: Discharge to home or other facility with appropriate resources  Description: INTERVENTIONS:  - Identify barriers to discharge w/patient and caregiver  - Arrange for needed discharge resources and transportation as appropriate  - Identify discharge learning needs (meds, wound care, etc.)  - Arrange for interpretive services to assist at discharge as needed  - Refer to Case Management Department for coordinating discharge planning if the patient needs post-hospital services based on physician/advanced practitioner order or complex needs related to functional status, cognitive ability, or social support system  Outcome: Not Progressing     Problem: Knowledge Deficit  Goal: Patient/family/caregiver demonstrates understanding of disease process, treatment plan, medications, and discharge instructions  Description: Complete learning assessment and assess knowledge base.  Interventions:  - Provide teaching at level  of understanding  - Provide teaching via preferred learning methods  Outcome: Not Progressing

## 2024-09-24 NOTE — ASSESSMENT & PLAN NOTE
Presented with a mechanical fall on her right thigh  Denies loss of consciousness or head strike  X-ray hip/femur-right, shows concern for right femur neck fracture, final read pending    Fall/safety precautions  See under fracture of right femur

## 2024-09-24 NOTE — ANESTHESIA PREPROCEDURE EVALUATION
Procedure:  INSERTION NAIL IM FEMUR ANTEGRADE (TROCHANTERIC)- right (Right: Leg Upper)    Relevant Problems   CARDIO   (+) Mixed hyperlipidemia (Resolved)   (+) Primary hypertension   (+) Venous insufficiency      ENDO   (+) Type 2 diabetes mellitus, without long-term current use of insulin (HCC)      GI/HEPATIC   (+) Hepatic steatosis      /RENAL   (+) AMANDA (acute kidney injury) (HCC)   (+) Chronic kidney disease-mineral and bone disorder   (+) Proteinuria due to type 2 diabetes mellitus  (HCC)   (+) Stage 4 chronic kidney disease (HCC)      HEMATOLOGY   (+) ABLA (acute blood loss anemia)      MUSCULOSKELETAL   (+) Osteoarthritis of both knees   (+) Primary localized osteoarthritis of left ankle   (+) Statin myopathy      NEURO/PSYCH   (+) Generalized anxiety disorder      Urinary   (+) Stress incontinence of urine      Eye   (+) Cortical senile cataract of both eyes      Rheumatology   (+) Acquired hallux valgus of both feet      Orthopedic/Musculoskeletal   (+) Fracture of neck of right femur (HCC)   (+) Pes planus      Other   (+) Class 2 obesity due to excess calories in adult   (+) Fall   (+) Hyperkalemia      Lab Results   Component Value Date    SODIUM 138 09/24/2024    K 5.4 (H) 09/24/2024     (H) 09/24/2024    CO2 20 (L) 09/24/2024    AGAP 8 09/24/2024    BUN 43 (H) 09/24/2024    CREATININE 1.87 (H) 09/24/2024    GLUC 141 (H) 09/24/2024    CALCIUM 9.5 09/24/2024    AST 35 09/24/2024    ALT 20 09/24/2024    ALKPHOS 74 09/24/2024    TP 6.6 09/24/2024    TBILI 0.30 09/24/2024    EGFR 25 09/24/2024     Lab Results   Component Value Date    WBC 7.47 09/24/2024    HGB 9.7 (L) 09/24/2024    HCT 31.4 (L) 09/24/2024    MCV 97 09/24/2024     09/24/2024         Physical Exam    Airway    Mallampati score: II  TM Distance: >3 FB  Neck ROM: full     Dental       Cardiovascular      Pulmonary      Other Findings  post-pubertal.      Anesthesia Plan  ASA Score- 3     Anesthesia Type- general with ASA  Monitors.         Additional Monitors:     Airway Plan: ETT.           Plan Factors-Exercise tolerance (METS): >4 METS.    Chart reviewed. EKG reviewed. Imaging results reviewed. Existing labs reviewed. Patient summary reviewed.                  Induction- intravenous.    Postoperative Plan-     Perioperative Resuscitation Plan - Level 1 - Full Code.       Informed Consent- Anesthetic plan and risks discussed with patient.  I personally reviewed this patient with the CRNA. Discussed and agreed on the Anesthesia Plan with the CRNA..

## 2024-09-24 NOTE — DISCHARGE INSTR - AVS FIRST PAGE
John Peters DO    Orthopedic Surgery, Shoulder/Elbow and Sports Medicine  [facility] [place] [address] [address] [place] [address], [place] [address]  Phone: [phone]    General Post-op Surgical Instructions:    Date of Procedure - 09/24/24     Procedure - Right IM nail femur    Weight Bearing Status - WBAT RLE    DVT Prophylaxis and Duration - aspirin 325 mg BID    PT/OT Instructions - to be discussed at first post-op    Stitches/Staples - Will be removed at 7-10 days postop; we will then place steristrips on incision site    Wound Care - maintain dressing, keep clean and dry    Xray follow up - to be done at or prior to office visit follow-up if indicated    Additional Info - Please complete your course of antibiotics     Any questions or concerns please call [phone] please!

## 2024-09-24 NOTE — ASSESSMENT & PLAN NOTE
Presented with a mechanical fall on her right thigh  Denies loss of consciousness or head strike  With right hip fracture  See under fracture of right femur

## 2024-09-24 NOTE — NURSING NOTE
PT doesn't know her meds she take  home daily.She said that she will find out it tomorrow and let nurses know about it.It will be passed on to day shift RN

## 2024-09-24 NOTE — ASSESSMENT & PLAN NOTE
Lab Results   Component Value Date    EGFR 27 09/23/2024    EGFR 26 (L) 08/01/2024    EGFR 27 (L) 04/24/2024    CREATININE 1.79 (H) 09/23/2024    CREATININE 1.95 (H) 08/01/2024    CREATININE 1.90 (H) 04/24/2024     Current creatinine within baseline  Monitor BUNs/creatinine  Avoid nephrotoxic agents/NSAIDs/relative hypotension  Urinary retention protocol

## 2024-09-24 NOTE — PHYSICAL THERAPY NOTE
Physical Therapy Cancellation Note     09/24/24 0859   PT Last Visit   PT Visit Date 09/24/24   Note Type   Note type Cancelled Session   Cancel Reasons Medical status   Additional Comments 73 y/o presents following a fall with subsequent right hip pain.  Imaging confirms, right femoral neck fx.  Pt scheduled to undergo IM nailing today.  Will hold PT evaluation/treatment until following procedure.

## 2024-09-24 NOTE — ASSESSMENT & PLAN NOTE
"/60   Pulse 70   Temp 97.8 °F (36.6 °C) (Tympanic)   Resp 18   Ht 5' 2\" (1.575 m)   Wt 43.7 kg (96 lb 6 oz)   SpO2 99%   BMI 17.63 kg/m²     Blood pressure elevated at the time of arrival, secondary to pain  Home regimen consist of amlodipine 5 mg daily, continue inpatient  "

## 2024-09-24 NOTE — OP NOTE
OPERATIVE REPORT  PATIENT NAME: La Garcia    :  1950  MRN: [id number]  Pt Location: [facility] OR ROOM 01    SURGERY DATE: 2024    Surgeons and Role:     * John Peters,  - Primary     * Colt Flannery PA-C - Assisting    Preop Diagnosis:  Hip fracture (HCC) [S72.009A]    Post-Op Diagnosis Codes:     * Hip fracture (HCC) [S72.009A]  Stable intertrochanteric fracture right hip    Procedure(s):  Right - INSERTION NAIL IM FEMUR ANTEGRADE (TROCHANTERIC)- right    Specimen(s):  * No specimens in log *    Estimated Blood Loss:   Minimal    Drains:  External Urinary Catheter (Active)   Collection Container Canister and suction tubing (For Female) 24   Suction Pressure (mmHg) 80 mmHg 24   Interventions Removed and skin assessed;Pericare performed 24   Number of days: 0       Anesthesia Type:   General    Operative Indications:  Hip fracture (HCC) [S72.009A]  Stable intertrochanteric fracture right hip    Operative Findings:  Stable intertrochanteric fracture right hip      Complications:   None    Procedure and Technique:      IMPLANTS: Dorcas gamma nail    INDICATIONS AND HISTORY:  This is a 74 y.o. female  with acute right hip pain and inability to ambulate s/p reported injury.  The patient's imaging demonstrates an intertrochanteric fracture.  The patient was indicated for cephalomedullary nail of the hip.  The patient understood the risks and benefits of the procedure, the risks including pain, infection, blood loss, blood clots, neurovascular injury, fracture, implant failure, malunion, nonunion, stiffness, stroke, heart attack all up to and including death.  The patient understood and did consent for surgery today.    DESCRIPTION OF OPERATION:  The patient was identified, and the procedure was verified.  The patient was seen in the pre-op holding area where the operative extremity was marked.  The patient was taken to the operating room, placed in the supine position.  The  operative extremity was prepped and draped in the usual sterile fashion.  A time-out was called.  The patient was administered IV antibiotics prior to incision.  Using a 10-blade knife incision was made just proximal to the tip of the greater trochanter.  Subcutaneous dissection was taken down to the fascia which was incised with a 10 blade knife.  Blunt finger dissection was taken down to the tip of the greater trochanter which was palpated.  A guidewire was then placed at the tip of the greater trochanter and advanced down the femoral shaft and shown to be in good position under intraoperative fluoroscopy.  An opening Reamer was then placed down the guidewire to the tip of the greater trochanter and the proximal femur was reamed.  Once this was done the femoral nail portion was placed down the femoral shaft and seated in good position under fluoroscopy.  Once this was done a distal lateral incision was made to introduce the guide sleeve for the femoral implant.  A guide pin was then advanced using the guide sleeve into the femoral head as close as possible to the center-center position on the AP and lateral intraoperative fluoroscopic views.  The femoral implant was then measured using a measuring guide.  Once the implant size was determined an opening Reamer for the lateral cortex was then inserted to the guide sleeve opening the lateral cortex.  The measured femoral implant was then advanced up into the femoral head under fluoroscopic imaging and deemed to be in adequate position.  And interlocking screw was then placed to lock the construct.  Attention was brought to the distal interlocking screw.  A guide sleeve was inserted on the distal aspect of the jig and skin incision was made advancing the guide sleeve to the cortical bone.  A drill was then used to drill the bone bicortically in this area.  A measuring guide was then used to measure the interlocking screw.  The measured interlocking screws had inserted  to lock the implant distally.  The remaining proximal accessory jig was removed.  Final intraoperative imaging demonstrated a well position cephalomedullary implant with adequate alignment of the fracture site.  The wounds were copiously irrigated with normal saline.  The fascia was closed with 0 Vicryl.  The subcutaneous tissue was closed with 2-0 Vicryl.  The epidermis was closed with staples.  Dressings included Mepilex dressing.  The patient tolerated the procedure well.  There were no complications throughout the case.  The patient was placed in PACU in stable condition.     I was present for the entire procedure., A qualified resident physician was not available., and A physician assistant was required during the procedure for retraction, tissue handling, dissection and suturing.    Patient Disposition:  PACU              SIGNATURE: John Peters DO  DATE: September 24, 2024  TIME: 7:06 PM

## 2024-09-24 NOTE — ASSESSMENT & PLAN NOTE
"BP (!) 187/81   Pulse 78   Temp 98.6 °F (37 °C) (Oral)   Resp 18   Ht 5' 2\" (1.575 m)   Wt 96.2 kg (212 lb)   SpO2 98%   BMI 38.78 kg/m²     Blood pressure elevated at the time of arrival, secondary to pain  Home regimen consist of amlodipine 5 mg daily, continue inpatient  Vitals as per protocol  "

## 2024-09-24 NOTE — CASE MANAGEMENT
Case Management Assessment & Discharge Planning Note    Patient name La Garcia  Location /-01 MRN [id number]  : 1950 Date 2024       Current Admission Date: 2024  Current Admission Diagnosis:Fracture of neck of right femur (HCC)   Patient Active Problem List    Diagnosis Date Noted Date Diagnosed    Fall 2024     Fracture of neck of right femur (HCC) 2024     Type 2 diabetes mellitus, without long-term current use of insulin (HCC) 2024     Class 2 obesity due to excess calories in adult 2024     Primary hypertension 2024     Stage 4 chronic kidney disease (HCC) 2024     Mixed hyperlipidemia 2024       LOS (days): 1  Geometric Mean LOS (GMLOS) (days):   Days to GMLOS:     OBJECTIVE:    Risk of Unplanned Readmission Score: 11.75     Current admission status: Inpatient     Preferred Pharmacy:   [facility] [address] [place] [address]  Phone: [phone] Fax: [phone]    Primary Care Provider: Deion Grimes MD    Primary Insurance: [employer] [facility]  Secondary Insurance:     ASSESSMENT:  Active Health Care Proxies    There are no active Health Care Proxies on file.       Readmission Root Cause  30 Day Readmission: No    Patient Information  Admitted from:: Home  Mental Status: Alert  During Assessment patient was accompanied by: Brother  Assessment information provided by:: Patient, Brother  Primary Caregiver: Self  Support Systems: Son, Friend, Family members  County of Residence: [place]  What city do you live in?: [place]  Home entry access options. Select all that apply.: Elevator  Type of Current Residence: Apartment ([facility])  Floor Level: 3  Upon entering residence, is there a bedroom on the main floor (no further steps)?: Yes  Upon entering residence, is there a bathroom on the main floor (no further steps)?: Yes  Living Arrangements:  Lives Alone  Is patient a ?: No    Activities of Daily Living Prior to Admission  Functional Status: Independent  Completes ADLs independently?: Yes  Ambulates independently?: Yes  Does patient use assisted devices?: Yes  Assisted Devices (DME) used: Quad Cane  Does patient currently own DME?: Yes  What DME does the patient currently own?: Quad Cane  Does patient have a history of Outpatient Therapy (PT/OT)?: No  Does the patient have a history of Short-Term Rehab?: No  Does patient have a history of HHC?: No  Does patient currently have HHC?: No    Patient Information Continued  Income Source: SSI/SSD  Does patient have prescription coverage?: Yes  Does patient receive dialysis treatments?: No  Does patient have a history of substance abuse?: No  Does patient have a history of Mental Health Diagnosis?: No    PHQ 2/9 Screening   Reviewed PHQ 2/9 Depression Screening Score?: No    Means of Transportation  Means of Transport to Appts:: Drives Self    Social Determinants of Health (SDOH)      Flowsheet Row Most Recent Value   Housing Stability    In the last 12 months, was there a time when you were not able to pay the mortgage or rent on time? N   In the past 12 months, how many times have you moved where you were living? 0   At any time in the past 12 months, were you homeless or living in a shelter (including now)? N   Transportation Needs    In the past 12 months, has lack of transportation kept you from medical appointments or from getting medications? no   In the past 12 months, has lack of transportation kept you from meetings, work, or from getting things needed for daily living? No   Food Insecurity    Within the past 12 months, you worried that your food would run out before you got the money to buy more. Never true   Within the past 12 months, the food you bought just didn't last and you didn't have money to get more. Never true   Utilities    In the past 12 months has the electric, gas, oil, or water  company threatened to shut off services in your home? No          DISCHARGE DETAILS:    Discharge planning discussed with:: Patient, Patient's Brother  Freedom of Choice: Yes     CM contacted family/caregiver?: Yes  Were Treatment Team discharge recommendations reviewed with patient/caregiver?: Yes  Did patient/caregiver verbalize understanding of patient care needs?: Yes  Were patient/caregiver advised of the risks associated with not following Treatment Team discharge recommendations?: Yes    Contacts  Patient Contacts: Joel Coavrrubias (brother)  Relationship to Patient:: Family  Contact Method: Phone, In Person  Phone Number:   Reason/Outcome: Emergency Contact, Discharge Planning    Requested Home Health Care         Is the patient interested in HHC at discharge?:  (TBD)    DME Referral Provided  Referral made for DME?: Yes  DME referral completed for the following items:: Walker  DME Supplier Name:: [employer]    Other Referral/Resources/Interventions Provided:  Interventions: None Indicated    Would you like to participate in our [facility] service program?  : No - Declined    Treatment Team Recommendation: Home  Discharge Destination Plan:: Home  Transport at Discharge : Family    CM met with the patient and her brother, Joel, at bedside. The patient will be going to the OR this afternoon. The patient reports she lives at a 62[facility] senior living community and has her own apartment. CM ordered a walker for the patient through [employer] for post-op support. PT/OT evals pending for level of care recommendations. CM following the patient through discharge.

## 2024-09-24 NOTE — ASSESSMENT & PLAN NOTE
Lab Results   Component Value Date    EGFR 25 09/24/2024    EGFR 27 09/23/2024    EGFR 26 (L) 08/01/2024    CREATININE 1.87 (H) 09/24/2024    CREATININE 1.79 (H) 09/23/2024    CREATININE 1.95 (H) 08/01/2024     Current creatinine within baseline  Monitor BUNs/creatinine  Avoid nephrotoxic agents/NSAIDs/relative hypotension  Urinary retention protocol

## 2024-09-24 NOTE — ANESTHESIA POSTPROCEDURE EVALUATION
Post-Op Assessment Note    CV Status:  Stable  Pain Score: 0    Pain management: adequate    Multimodal analgesia used between 6 hours prior to anesthesia start to PACU discharge    Mental Status:  Alert and awake   Hydration Status:  Stable   PONV Controlled:  None   Airway Patency:  Patent  Airway: intubated  Two or more mitigation strategies used for obstructive sleep apnea   Post Op Vitals Reviewed: Yes    No anethesia notable event occurred.    Staff: CRNA               /62 (09/24/24 1938)    Temp 97.7 °F (36.5 °C) (09/24/24 1938)    Pulse 90 (09/24/24 1938)   Resp 22 (09/24/24 1938)    SpO2 97 % (09/24/24 1938)

## 2024-09-24 NOTE — UTILIZATION REVIEW
Initial Clinical Review    Admission: Date/Time/Statement:   Admission Orders (From admission, onward)       Ordered        09/23/24 2258  INPATIENT ADMISSION  Once                          Orders Placed This Encounter   Procedures    INPATIENT ADMISSION     Standing Status:   Standing     Number of Occurrences:   1     Order Specific Question:   Level of Care     Answer:   Med Surg [16]     Order Specific Question:   Estimated length of stay     Answer:   More than 2 Midnights     Order Specific Question:   Certification     Answer:   I certify that inpatient services are medically necessary for this patient for a duration of greater than two midnights. See H&P and MD Progress Notes for additional information about the patient's course of treatment.     ED Arrival Information       Expected   -    Arrival   9/23/2024 21:58    Acuity   Less Urgent              Means of arrival   Ambulance    Escorted by   [facility]    Service   Hospitalist    Admission type   Emergency              Arrival complaint   -             Chief Complaint   Patient presents with    Fall     R thigh pain after mechanical fall, no head strike.        Initial Presentation: 74 y.o. female who presented by EMS to [facility] ED. Admitted as Inpatient for evaluation and treatment of who presented by EMS to [facility] ED. Admitted as Inpatient for evaluation and treatment of Fracture R femur.      PMHx:  has a past medical history of Arthritis, Diabetes mellitus (HCC), Hypertension, and Renal disorder.      Presented w/ a mechanical fall at a [facility] living facility, where she lives. Denies any loss of consciousness or head strike. . On exam, Although patient interprets her pain to be in her right thigh she here has shortening and external rotation of the right leg and also the area of her hip looks deformed. Labs Bun/Creat 43/1.79, WBC 11.09, H/H 10.8/34.1, TSH 5.629. Xray R hip: Acute displaced angulated right  femoral intertrochanteric fracture.In the ED, pt received IV morphine x1, Tranexamic Acid IV x1, Lidoderm patch x1 and Robaxin x1.     Plan: NPO for OR in am. Pain control. Hold home oral antidiabetic agents.Insulin on sliding scale.  Carb consistent diet, once able to eat. Ortho consulted.      Anticipated Length of Stay/Certification Statement: Patient will be admitted on an inpatient basis with an anticipated length of stay of greater than 2 midnights secondary to fall, hip /femur fracture .     Date: 9/24/24   Day 2: No events overnight. Pt reports pain controlled.   Plan: Continue NPO for OR. Pain control.Hold home oral antidiabetic agents. Insulin on sliding scale.  Carb consistent diet, once able to eat.   Certification Statement: The patient will continue to require additional inpatient hospital stay due to pending surgery today     Ortho consult: Fracture neck of R femur. On exam, R LE short and externally rotated. NO ROM due to fx. Positive DF/PF, EHL/FLH. Sensation intact to the right lower extremity. +DP pulse. Plan: NWB RLE. OR today for IM nail R femur. NPO. Pain control     9/24/24 Operative note:   Procedure(s):  Right - INSERTION NAIL IM FEMUR ANTEGRADE (TROCHANTERIC)- right.  General anesthesia.     Date: 9/25/24  Day 3: Has surpassed a 2nd midnight with active treatments and services.  Pt doing well overall. Does have pain in her R hip but improved from day prior. On exam, Dressing c/d/i. Mild TTP about the thigh.2+ DP pulse.     Abnormal labs: Bun/Creat 46/2.35,. H/H 9.4/30.9.  Plan: PT/OT evals pending. Pain control as needed. Start Lantus 5 units at bedtime, sliding scale and low-carb diet. Continue amlodipine 5 mg daily with BP hold parameters. Give gentle IV fluids today. Herparin for DVT PPX. WBAT R LE. CMP and CBC in am.    Certification Statement: The patient will continue to require additional inpatient hospital stay due to pending PT, OT evaluation.  IV fluids.     ED Triage Vitals    Temperature Pulse Respirations Blood Pressure SpO2 Pain Score   09/23/24 2159 09/23/24 2201 09/23/24 2201 09/23/24 2203 09/23/24 2201 09/23/24 2201   98.6 °F (37 °C) 78 18 (!) 187/81 98 % 8     Weight (last 2 days)       Date/Time Weight    09/24/24 1953 96 (211.64)    09/24/24 0014 43.7 (96.38)    09/23/24 2201 96.2 (212)            Vital Signs (last 3 days)       Date/Time Temp Pulse Resp BP MAP (mmHg) SpO2 Calculated FIO2 (%) - Nasal Cannula O2 Flow Rate (L/min) Nasal Cannula O2 Flow Rate (L/min) O2 Device Cardiac (WDL) Patient Position - Orthostatic VS Aryan Coma Scale Score Pain    09/25/24 0410 -- -- -- -- -- -- -- -- -- -- -- -- 15 7    09/25/24 0210 98.5 °F (36.9 °C) 79 18 128/70 89 97 % 24 -- 1 L/min Nasal cannula -- Lying -- --    09/25/24 0110 98.5 °F (36.9 °C) 80 18 130/60 82 96 % 24 -- 1 L/min Nasal cannula -- Lying -- No Pain    09/25/24 0010 98.5 °F (36.9 °C) 98 18 121/78 94 96 % 24 -- 1 L/min Nasal cannula -- Lying 15 4    09/24/24 2305 98.7 °F (37.1 °C) 91 18 130/65 92 96 % 28 -- 2 L/min Nasal cannula -- Lying -- --    09/24/24 2205 98.6 °F (37 °C) 93 20 126/57 81 96 % 28 -- 2 L/min Nasal cannula -- Lying -- --    09/24/24 2135 98.9 °F (37.2 °C) 92 18 111/72 82 95 % 28 -- 2 L/min Nasal cannula -- Lying -- --    09/24/24 2105 98.8 °F (37.1 °C) 91 18 126/58 79 93 % 28 -- 2 L/min Nasal cannula -- Lying -- --    09/24/24 2050 99 °F (37.2 °C) 93 21 124/60 81 94 % 28 -- 2 L/min Nasal cannula -- Lying -- --    09/24/24 2035 97.4 °F (36.3 °C) 93 21 100/76 84 97 % 32 -- 3 L/min Nasal cannula -- Lying -- --    09/24/24 2020 97.5 °F (36.4 °C) 89 20 131/57 85 92 % 32 -- 3 L/min Nasal cannula -- Lying -- --    09/24/24 2008 97.9 °F (36.6 °C) 89 21 130/62 89 95 % 32 -- 3 L/min Nasal cannula WDL -- -- 5 09/24/24 1953 98.1 °F (36.7 °C) 87 20 136/62 89 -- 36 -- 4 L/min Nasal cannula WDL -- -- 5 09/24/24 1938 97.7 °F (36.5 °C) 90 22 127/62 -- 97 % -- 6 L/min -- Simple mask WDL -- -- --    09/24/24 1540 99.9  °F (37.7 °C) 90 16 145/65 -- 95 % -- -- -- None (Room air) -- -- -- --    09/24/24 1525 -- -- -- -- -- -- -- -- -- -- -- -- -- 10 - Worst Possible Pain    09/24/24 1333 -- -- -- -- -- -- -- -- -- -- -- -- -- 10 - Worst Possible Pain    09/24/24 0925 -- -- -- 136/60 -- -- -- -- -- None (Room air) -- -- -- 6    09/24/24 0757 97.8 °F (36.6 °C) 70 18 147/53 -- 99 % -- -- -- -- -- Lying -- --    09/24/24 0606 -- -- -- -- -- -- -- -- -- -- -- -- -- 8    09/24/24 0033 -- -- -- -- -- -- -- -- -- -- -- -- -- 10 - Worst Possible Pain    09/24/24 0014 98.8 °F (37.1 °C) 82 18 157/70 118 97 % -- -- -- None (Room air) -- Lying -- --    09/23/24 2203 -- -- -- 187/81 -- -- -- -- -- -- -- -- -- --    09/23/24 2201 -- 78 18 -- -- 98 % -- -- -- None (Room air) -- Lying -- 8    09/23/24 2159 98.6 °F (37 °C) -- -- -- -- -- -- -- -- -- -- -- -- --              Pertinent Labs/Diagnostic Test Results:   Radiology:  XR chest 1 view portable   Final Interpretation by Allen Schneider MD (09/24 0739)      No acute cardiopulmonary disease.            Workstation performed: [id number]         XR hip/pelv 2-3 vws right   Final Interpretation by Jose Martin Marsh MD (09/24 0724)      Acute displaced angulated right femoral intertrochanteric fracture.      Clinical provider is aware of the findings.         Computerized Assisted Algorithm (CAA) may have been used to analyze all applicable images.            Workstation performed: [id number]         XR femur 2 views RIGHT   Final Interpretation by Jose Martin Marsh MD (09/24 0728)      Acute displaced angulated right femoral intertrochanteric fracture. Distal femur is intact.      Clinical provider is aware of the findings.         Computerized Assisted Algorithm (CAA) may have been used to analyze all applicable images.         Workstation performed: [id number]                   Results from last 7 days   Lab Units 09/25/24  0433 09/24/24  0522 09/23/24  2625   WBC  Thousand/uL 9.17 7.47 11.09*   HEMOGLOBIN g/dL 9.4* 9.7* 10.8*   HEMATOCRIT % 30.9* 31.4* 34.1*   PLATELETS Thousands/uL 213 218 237   TOTAL NEUT ABS Thousands/µL  --   --  8.77*         Results from last 7 days   Lab Units 09/25/24  0433 09/24/24  0522 09/23/24  2255   SODIUM mmol/L 136 138 137   POTASSIUM mmol/L 5.1 5.4* 5.0   CHLORIDE mmol/L 105 110* 108   CO2 mmol/L 19* 20* 21   ANION GAP mmol/L 12 8 8   BUN mg/dL 46* 43* 43*   CREATININE mg/dL 2.35* 1.87* 1.79*   EGFR ml/min/1.73sq m 19 25 27   CALCIUM mg/dL 9.9 9.5 9.7   MAGNESIUM mg/dL  --  2.0  --      Results from last 7 days   Lab Units 09/24/24  0522   AST U/L 35   ALT U/L 20   ALK PHOS U/L 74   TOTAL PROTEIN g/dL 6.6   ALBUMIN g/dL 3.6   TOTAL BILIRUBIN mg/dL 0.30     Results from last 7 days   Lab Units 09/25/24  0712 09/24/24  2110 09/24/24  1548 09/24/24  1059 09/24/24  0830 09/24/24  0018   POC GLUCOSE mg/dl 244* 197* 92 85 91 134     Results from last 7 days   Lab Units 09/25/24  0433 09/24/24  0522 09/23/24  2255   GLUCOSE RANDOM mg/dL 250* 141* 128         Results from last 7 days   Lab Units 09/23/24  2255   HEMOGLOBIN A1C % 6.8*   EAG mg/dl 148     Results from last 7 days   Lab Units 09/23/24  2255   PROTIME seconds 15.1*   INR  1.15   PTT seconds 28     Results from last 7 days   Lab Units 09/23/24  2255   TSH 3RD GENERATON uIU/mL 5.629*     ED Treatment-Medication Administration from 09/23/2024 2158 to 09/24/2024 0005         Date/Time Order Dose Route Action     09/23/2024 2211 methocarbamol (ROBAXIN) tablet 500 mg 500 mg Oral Given     09/23/2024 2212 lidocaine (LIDODERM) 5 % patch 1 patch 1 patch Topical Medication Applied     09/23/2024 8417 tranexamic Acid 690 mg in sodium chloride 0.9 % 100 mL IVPB 690 mg Intravenous New Bag     09/23/2024 2300 morphine injection 2 mg 2 mg Intravenous Given            Past Medical History:   Diagnosis Date    Arthritis     Diabetes mellitus (HCC)     Hypertension     Renal disorder      Present on  Admission:   Fall   Fracture of neck of right femur (HCC)   Type 2 diabetes mellitus, without long-term current use of insulin (HCC)   Class 2 obesity due to excess calories in adult   Primary hypertension   Stage 4 chronic kidney disease (HCC)      Admitting Diagnosis: Hip fracture (HCC) [S72.009A]  Weakness [R53.1]  Fall, initial encounter [W19.XXXA]  Age/Sex: 74 y.o. female  Admission Orders:  Scheduled Medications:  amLODIPine, 5 mg, Oral, Daily  docusate sodium, 100 mg, Oral, BID  heparin (porcine), 5,000 Units, Subcutaneous, Q8H MORIS  insulin lispro, 1-5 Units, Subcutaneous, TID AC  insulin lispro, 1-5 Units, Subcutaneous, HS  [START ON 9/25/2024] Sodium Zirconium Cyclosilicate, 10 g, Oral, Daily  ceFAZolin (ANCEF) IVPB (premix in dextrose) 1,000 mg 50 mL  Dose: 1,000 mg  Freq: Every 8 hours Route: IV  Last Dose: 1,000 mg (09/25/24 0112)  Start: 09/25/24 0130 End: 09/25/24 1018     tranexamic acid (CYKLOKAPRON) 1000-0.7 MG/100ML-% injection 1,000 mg  Dose: 1,000 mg  Freq: Once Route: IV  Start: 09/24/24 1500 End: 09/24/24 1856   acetaminophen (Ofirmev) injection 1,000 mg  Dose: 1,000 mg  Freq: Once Route: IV  Last Dose: 1,000 mg (09/24/24 2012)  Start: 09/24/24 2015 End: 09/24/24 2027      Continuous IV Infusions:   multi-electrolyte (PLASMALYTE-A/ISOLYTE-S PH 7.4) IV solution  Rate: 75 mL/hr Dose: 75 mL/hr  Freq: Continuous Route: IV  Indications of Use: IV Hydration  Start: 09/25/24 1030 End: 09/25/24 2229          PRN Meds:  acetaminophen, 650 mg, Oral, Q6H PRN  HYDROmorphone, 0.2 mg, Intravenous, Q3H PRN - x3 9/24 given  ondansetron, 4 mg, Intravenous, Q6H PRN - x1 9/24 given  oxyCODONE, 5 mg, Oral, Q4H PRN  tranexamic Acid, , ,         IP CONSULT TO CASE MANAGEMENT  IP CONSULT TO ORTHOPEDIC SURGERY    Network Utilization Review Department  ATTENTION: Please call with any questions or concerns to [phone] and carefully listen to the prompts so that you are directed to the right person. All voicemails  are confidential.   For Discharge needs, contact Care Management DC Support Team at [phone] opt. 2  Send all requests for admission clinical reviews, approved or denied determinations and any other requests to dedicated fax number below belonging to the campus where the patient is receiving treatment. List of dedicated fax numbers for the Facilities:  FACILITY NAME UR FAX NUMBER   ADMISSION DENIALS (Administrative/Medical Necessity) [phone]   DISCHARGE SUPPORT TEAM (NETWORK) [phone]   PARENT CHILD HEALTH (Maternity/NICU/Pediatrics) [phone]   [facility] [phone]   [facility] [phone]   [facility] [phone]   [facility] [phone]   [facility] [phone]   [facility] [phone]   [facility] [phone]   [facility] [phone]   [facility] [phone]   [facility] [phone]   [facility] [phone]   [facility] [phone]

## 2024-09-24 NOTE — PROGRESS NOTES
"Progress Note - Hospitalist   Name: La Garcia 74 y.o. female I MRN: [id number]  Unit/Bed#: -01 I Date of Admission: 9/23/2024   Date of Service: 9/24/2024 I Hospital Day: 1     Assessment & Plan  Fracture of neck of right femur (HCC)  Resulted after a mechanical fall on right thigh  X-rays showing right femoral intertrochanteric fracture  Ortho consult with plans for surgery this afternoon  N.p.o., PT and OT to evaluate  Pain control as needed  Fall  Presented with a mechanical fall on her right thigh  Denies loss of consciousness or head strike  With right hip fracture  See under fracture of right femur  Type 2 diabetes mellitus, without long-term current use of insulin ([facility])  Lab Results   Component Value Date    HGBA1C 6.6 (H) 04/24/2024       Recent Labs     09/24/24  0018 09/24/24  0830   POCGLU 134 91       Blood Sugar Average: Last 72 hrs:  (P) 112.5  Home regimen consist of glimepiride and Actos  Hold home oral antidiabetic agents  Insulin on sliding scale  Carb consistent diet, once able to eat    Class 2 obesity due to excess calories in adult  Counseling on lifestyle modifications  Primary hypertension  /60   Pulse 70   Temp 97.8 °F (36.6 °C) (Tympanic)   Resp 18   Ht 5' 2\" (1.575 m)   Wt 43.7 kg (96 lb 6 oz)   SpO2 99%   BMI 17.63 kg/m²     Blood pressure elevated at the time of arrival, secondary to pain  Home regimen consist of amlodipine 5 mg daily, continue inpatient  Stage 4 chronic kidney disease (HCC)  Lab Results   Component Value Date    EGFR 25 09/24/2024    EGFR 27 09/23/2024    EGFR 26 (L) 08/01/2024    CREATININE 1.87 (H) 09/24/2024    CREATININE 1.79 (H) 09/23/2024    CREATININE 1.95 (H) 08/01/2024     Current creatinine within baseline  Monitor BUNs/creatinine  Avoid nephrotoxic agents/NSAIDs/relative hypotension  Urinary retention protocol    Mixed hyperlipidemia  Intolerant to statin  Outpatient PCP follow-up    VTE Pharmacologic Prophylaxis:   Pharmacologic: " Heparin  Mechanical VTE Prophylaxis in Place: Yes    Current Length of Stay: 1 day(s)    Current Patient Status: Inpatient   Certification Statement: The patient will continue to require additional inpatient hospital stay due to pending surgery today    Discharge Plan: pending    Code Status: Level 1 - Full Code      Subjective:   No events overnight.  Reports pain controlled. Brother at bedside, questions answered. Denies fevers, chills, CP or SOB.    Objective:     Vitals:   Temp (24hrs), Av.4 °F (36.9 °C), Min:97.8 °F (36.6 °C), Max:98.8 °F (37.1 °C)    Temp:  [97.8 °F (36.6 °C)-98.8 °F (37.1 °C)] 97.8 °F (36.6 °C)  HR:  [70-82] 70  Resp:  [18] 18  BP: (136-187)/(53-81) 136/60  SpO2:  [97 %-99 %] 99 %  Body mass index is 17.63 kg/m².     Input and Output Summary (last 24 hours):       Intake/Output Summary (Last 24 hours) at 2024 1046  Last data filed at 2024 0525  Gross per 24 hour   Intake 0 ml   Output 450 ml   Net -450 ml       Physical Exam:     Physical Exam  Vitals and nursing note reviewed.   Constitutional:       Appearance: Normal appearance.   HENT:      Head: Normocephalic.   Eyes:      Conjunctiva/sclera: Conjunctivae normal.   Cardiovascular:      Rate and Rhythm: Normal rate.   Pulmonary:      Effort: Pulmonary effort is normal. No respiratory distress.   Abdominal:      Palpations: Abdomen is soft.      Tenderness: There is no abdominal tenderness.   Musculoskeletal:         General: No swelling. Normal range of motion.   Skin:     General: Skin is warm.   Neurological:      General: No focal deficit present.      Mental Status: She is alert. Mental status is at baseline.         Additional Data:     Labs:    Results from last 7 days   Lab Units 24  0522 24  2255   WBC Thousand/uL 7.47 11.09*   HEMOGLOBIN g/dL 9.7* 10.8*   HEMATOCRIT % 31.4* 34.1*   PLATELETS Thousands/uL 218 237   SEGS PCT %  --  80*   LYMPHO PCT %  --  13*   MONO PCT %  --  6   EOS PCT %  --  1      Results from last 7 days   Lab Units 09/24/24  0522   SODIUM mmol/L 138   POTASSIUM mmol/L 5.4*   CHLORIDE mmol/L 110*   CO2 mmol/L 20*   BUN mg/dL 43*   CREATININE mg/dL 1.87*   ANION GAP mmol/L 8   CALCIUM mg/dL 9.5   ALBUMIN g/dL 3.6   TOTAL BILIRUBIN mg/dL 0.30   ALK PHOS U/L 74   ALT U/L 20   AST U/L 35   GLUCOSE RANDOM mg/dL 141*     Results from last 7 days   Lab Units 09/23/24  2255   INR  1.15     Results from last 7 days   Lab Units 09/24/24  0830 09/24/24  0018   POC GLUCOSE mg/dl 91 134     Results from last 7 days   Lab Units 09/23/24  2255   HEMOGLOBIN A1C % 6.8*               * I Have Reviewed All Lab Data Listed Above.  * Additional Pertinent Lab Tests Reviewed: All Labs For Current Hospital Admission Reviewed    Mobility:  Basic Mobility Inpatient Raw Score: 10  [facility] Goal: 4: Move to chair/commode  [facility] Achieved: 2: Bed activities/Dependent transfer    Lines:     Invasive Devices       Peripheral Intravenous Line  Duration             Peripheral IV 09/23/24 Left Antecubital <1 day              Drain  Duration             External Urinary Catheter <1 day                       Imaging:    Imaging Reports Reviewed Today Include:     XR chest 1 view portable    Result Date: 9/24/2024  Impression: No acute cardiopulmonary disease. Workstation performed: [id number]     XR femur 2 views RIGHT    Result Date: 9/24/2024  Impression: Acute displaced angulated right femoral intertrochanteric fracture. Distal femur is intact. Clinical provider is aware of the findings. Computerized Assisted Algorithm (CAA) may have been used to analyze all applicable images. Workstation performed: [id number]     XR hip/pelv 2-3 vws right    Result Date: 9/24/2024  Impression: Acute displaced angulated right femoral intertrochanteric fracture. Clinical provider is aware of the findings. Computerized Assisted Algorithm (CAA) may have been used to analyze all applicable images. Workstation performed: [id number]        Recent  Cultures (last 7 days):           Last 24 Hours Medication List:   Current Facility-Administered Medications   Medication Dose Route Frequency Provider Last Rate    acetaminophen  650 mg Oral Q6H PRN Denise Cho MD      amLODIPine  5 mg Oral Daily Denise Cho MD      docusate sodium  100 mg Oral BID Denise Cho MD      heparin (porcine)  5,000 Units Subcutaneous Q8H MORIS Denise Cho MD      HYDROmorphone  0.2 mg Intravenous Q3H PRN Denise Cho MD      insulin lispro  1-5 Units Subcutaneous TID AC Denise Cho MD      insulin lispro  1-5 Units Subcutaneous HS Denise Cho MD      ondansetron  4 mg Intravenous Q6H PRN Denise Cho MD      oxyCODONE  5 mg Oral Q4H PRN Denise Cho MD      [START ON 9/25/2024] Sodium Zirconium Cyclosilicate  10 g Oral Daily Sumanth Tello MD      tranexamic acid  1,000 mg Intravenous Once Colt Flannery PA-C      tranexamic Acid               Today, Patient Was Seen By: Sumanth Tello MD    ** Please Note: Dictation voice to text software may have been used in the creation of this document. **

## 2024-09-24 NOTE — OCCUPATIONAL THERAPY NOTE
Occupational Therapy Cx Note     Patient Name: La Garcia  Today's Date: 9/24/2024  Problem List  Principal Problem:    Fracture of neck of right femur (HCC)  Active Problems:    Fall    Type 2 diabetes mellitus, without long-term current use of insulin (HCC)    Class 2 obesity due to excess calories in adult    Primary hypertension    Stage 4 chronic kidney disease (HCC)    Mixed hyperlipidemia            09/24/24 0919   OT Last Visit   OT Visit Date 09/24/24   Note Type   Note type Cancelled Session   Additional Comments OT orders received. Chart reviewed. Pt presents to [facility] s/p a fall. Pt with R femoral neck fx.  Pt scheduled to undergo IM nailing today.  Will hold OT evaluation/treatment until following procedure.

## 2024-09-24 NOTE — ED PROVIDER NOTES
1. Hip fracture (HCC)      ED Disposition       ED Disposition   Admit    Condition   Stable    Date/Time   Mon Sep 23, 2024 10:57 PM    Comment   Case was discussed with dr jain and   and the patient's admission status was agreed to be Admission Status: inpatient status to the service of Dr. Walker .               Assessment & Plan       Medical Decision Making  Differential diagnosis includes but is not limited to fracture, dislocation, contusion, strain    Problems Addressed:  Hip fracture (HCC): acute illness or injury    Amount and/or Complexity of Data Reviewed  Labs: ordered. Decision-making details documented in ED Course.  Radiology: ordered and independent interpretation performed.     Details: Femoral neck fracture  ECG/medicine tests: ordered and independent interpretation performed.  Discussion of management or test interpretation with external provider(s): Discussed x-ray with orthopedic surgery they are in agreement that patient needs admission for surgical repair.  Patient will be admitted to the internal medical service where I also spoke with the ordered basic lab work EKG chest x-ray as well as starting on TXA here in the ED    Risk  Prescription drug management.  Decision regarding hospitalization.  Emergency major surgery.                     Medications   lidocaine (LIDODERM) 5 % patch 1 patch (1 patch Topical Medication Applied 9/23/24 2212)   oxyCODONE (ROXICODONE) IR tablet 5 mg (5 mg Oral Not Given 9/23/24 2212)   tranexamic Acid 690 mg in sodium chloride 0.9 % 100 mL IVPB (has no administration in time range)   methocarbamol (ROBAXIN) tablet 500 mg (500 mg Oral Given 9/23/24 2211)   morphine injection 2 mg (2 mg Intravenous Given 9/23/24 2300)       History of Present Illness       74-year-old female comes in for evaluation of leg pain after mechanical fall.  States she was walking to bed when she slipped and fell in front of her bed.  She landed on her right thigh.  Denies any knee pain  or hip pain but mostly her upper thigh.  No head strike no loss of consciousness.  States she was unable to get off the floor by herself.  Needed to call EMS to lift her patient has pain when she tries to move her leg      History provided by:  Patient and EMS personnel   used: No    Fall  Mechanism of injury: fall    Injury location:  Leg  Leg injury location:  R upper leg  Incident location:  Home  Time since incident:  30 minutes  Fall:     Fall occurred:  Tripped and walking    Impact surface:  Carpet    Point of impact:  Unable to specify    Entrapped after fall: yes    Protective equipment: none    Suspicion of alcohol use: no    Suspicion of drug use: no    Tetanus status:  Unknown  Prior to arrival data:     Bystander interventions:  First aid    Patient ambulatory at scene: no      Blood loss:  None    Responsiveness at scene:  Alert    Orientation at scene:  Person, place, situation and time    Loss of consciousness: no      Amnesic to event: no    Associated symptoms: no abdominal pain, no back pain, no chest pain, no seizures and no vomiting        Review of Systems   Constitutional:  Negative for chills and fever.   HENT:  Negative for ear pain and sore throat.    Eyes:  Negative for pain and visual disturbance.   Respiratory:  Negative for cough and shortness of breath.    Cardiovascular:  Negative for chest pain and palpitations.   Gastrointestinal:  Negative for abdominal pain and vomiting.   Genitourinary:  Negative for dysuria and hematuria.   Musculoskeletal:  Negative for arthralgias and back pain.   Skin:  Negative for color change and rash.   Neurological:  Negative for seizures and syncope.   All other systems reviewed and are negative.          Objective     ED Triage Vitals   Temperature Pulse Blood Pressure Respirations SpO2 Patient Position - Orthostatic VS   09/23/24 2159 09/23/24 2201 09/23/24 2203 09/23/24 2201 09/23/24 2201 09/23/24 2201   98.6 °F (37 °C) 78 (!)  187/81 18 98 % Lying      Temp Source Heart Rate Source BP Location FiO2 (%) Pain Score    09/23/24 2159 09/23/24 2201 09/23/24 2201 -- 09/23/24 2201    Oral Monitor Right arm  8        Physical Exam  Musculoskeletal:      Right hip: Deformity present. Decreased range of motion.      Right upper leg: Tenderness present.      Comments: Although patient interprets her pain to be in her right thigh she here has shortening and external rotation of the right leg and also the area of her hip looks deformed.         Labs Reviewed   CBC AND DIFFERENTIAL - Abnormal       Result Value    WBC 11.09 (*)     RBC 3.63 (*)     Hemoglobin 10.8 (*)     Hematocrit 34.1 (*)     MCV 94      MCH 29.8      MCHC 31.7      RDW 14.1      MPV 9.4      Platelets 237      nRBC 0      Segmented % 80 (*)     Immature Grans % 0      Lymphocytes % 13 (*)     Monocytes % 6      Eosinophils Relative 1      Basophils Relative 0      Absolute Neutrophils 8.77 (*)     Absolute Immature Grans 0.04      Absolute Lymphocytes 1.49      Absolute Monocytes 0.61      Eosinophils Absolute 0.15      Basophils Absolute 0.03     BASIC METABOLIC PANEL - Abnormal    Sodium 137      Potassium 5.0      Chloride 108      CO2 21      ANION GAP 8      BUN 43 (*)     Creatinine 1.79 (*)     Glucose 128      Calcium 9.7      eGFR 27      Narrative:     National Kid[employer] Foundation guidelines for Chronic Kidney Disease (CKD):     Stage 1 with normal or high GFR (GFR > 90 mL/min/1.73 square meters)    Stage 2 Mild CKD (GFR = 60-89 mL/min/1.73 square meters)    Stage 3A Moderate CKD (GFR = 45-59 mL/min/1.73 square meters)    Stage 3B Moderate CKD (GFR = 30-44 mL/min/1.73 square meters)    Stage 4 Severe CKD (GFR = 15-29 mL/min/1.73 square meters)    Stage 5 End Stage CKD (GFR <15 mL/min/1.73 square meters)  Note: GFR calculation is accurate only with a steady state creatinine   PROTIME-INR   APTT   TYPE AND SCREEN   ABORH RECHECK     XR hip/pelv 2-3 vws right     (Results Pending)   XR femur 2 views RIGHT    (Results Pending)   XR chest 1 view portable    (Results Pending)       ECG 12 Lead Documentation Only    Date/Time: 9/23/2024 11:01 PM    Performed by: Shelby Valle DO  Authorized by: Shelby Valle DO    Rate:     ECG rate:  76  Rhythm:     Rhythm: sinus rhythm    QRS:     QRS axis:  Normal  Conduction:     Conduction: normal    ST segments:     ST segments:  Normal      ED Medication and Procedure Management   None     Patient's Medications    No medications on file     No discharge procedures on file.     Shelby Valle DO  09/23/24 2097

## 2024-09-24 NOTE — CONSULTS
Consultation - Orthopedics   Name: La Garcia 74 y.o. female I MRN: [id number]  Unit/Bed#: -01 I Date of Admission: 9/23/2024   Date of Service: 9/24/2024 I Hospital Day: 1   Consult to Orthopedics  Consult performed by: Colt Flannery PA-C  Consult ordered by: Denise Cho MD        Physician Requesting Evaluation: Sumanth Tello MD   Reason for Evaluation / Principal Problem: Right hip pain    Assessment & Plan  Fracture of neck of right femur (HCC)  NWB RLE  Plan for OR today with IM nail right femur  NPO  Discussed with Dr. Peters  Fall    Orthopedics service will follow.    History of Present Illness   HPI: La Garcia is a 74 y.o. year old female who presents with right hip pain after fall from standing. She uses a cane at baseline. She lives at a senior living independently. She has a history of BL knee DJD using the cane. She denies head strike. She denies numbness or tingling in the leg or foot.     Review of Systems  I have reviewed the patient's PMH, PSH, Social History, Family History, Meds, and Allergies  Historical Information   Past Medical History:   Diagnosis Date    Arthritis     Diabetes mellitus (HCC)     Hypertension     Renal disorder      Past Surgical History:   Procedure Laterality Date    APPENDECTOMY      HERNIA REPAIR       Social History     Tobacco Use    Smoking status: Never    Smokeless tobacco: Never   Vaping Use    Vaping status: Never Used   Substance and Sexual Activity    Alcohol use: Not Currently    Drug use: Not Currently    Sexual activity: Not on file     E-Cigarette/Vaping    E-Cigarette Use Never User      E-Cigarette/Vaping Substances     Family history non-contributory    Objective      Temp:  [97.8 °F (36.6 °C)-98.8 °F (37.1 °C)] 97.8 °F (36.6 °C)  HR:  [70-82] 70  Resp:  [18] 18  BP: (147-187)/(53-81) 147/53  O2 Device: None (Room air)          I/O         09/22 0701 09/23 0700 09/23 0701 09/24 0700 09/24 0701 09/25 0700    P.O.  0     Total  "Intake(mL/kg)  0 (0)     Urine (mL/kg/hr)  450     Emesis/NG output  0     Stool  0     Total Output  450     Net  -450            Unmeasured Stool Occurrence  0 x     Unmeasured Emesis Occurrence  1 x           Lines/Drains/Airways       Active Status       Name Placement date Placement time Site Days    External Urinary Catheter 09/24/24  0150  -- less than 1                  Physical Exam   Ortho Exam   Right lower extremity:  -short and externally rotated  -no ROM due to known fracture  -Positive DF/PF, EHL/FLH  -sensation intact to the right lower extremity  -+DP pulse      Lab Results: I have reviewed the following results: none   Recent Labs     09/23/24  2255 09/24/24  0522   WBC 11.09* 7.47   HGB 10.8* 9.7*   HCT 34.1* 31.4*    218   BUN 43* 43*   CREATININE 1.79* 1.87*   PTT 28  --    INR 1.15  --      Blood Culture: No results found for: \"BLOODCX\"  Wound Culture: No results found for: \"WOUNDCULT\"  "

## 2024-09-24 NOTE — ASSESSMENT & PLAN NOTE
"Lab Results   Component Value Date    HGBA1C 6.6 (H) 04/24/2024       No results for input(s): \"POCGLU\" in the last 72 hours.    Blood Sugar Average: Last 72 hrs:    Home regimen consist of glimepiride and Actos  Hold home oral antidiabetic agents  Insulin on sliding scale  Carb consistent diet, once able to eat    "

## 2024-09-24 NOTE — CASE MANAGEMENT
Case Management Progress Note    Patient name La Garcia  Location /-01 MRN [id number]  : 1950 Date 2024       LOS (days): 1  Geometric Mean LOS (GMLOS) (days):   Days to GMLOS:        OBJECTIVE:    Current admission status: Inpatient  Preferred Pharmacy:   [address] [address]  Phone: [phone] Fax: [phone]    Primary Care Provider: Deion Grimes MD    [facility]  Secondary Insurance:     PROGRESS NOTE:    Walker delivered to the patient's room. The patient's brother, Joel, signed to confirm delivery. Patient aware of $20 copay.

## 2024-09-24 NOTE — ASSESSMENT & PLAN NOTE
Lab Results   Component Value Date    HGBA1C 6.6 (H) 04/24/2024       Recent Labs     09/24/24  0018 09/24/24  0830   POCGLU 134 91       Blood Sugar Average: Last 72 hrs:  (P) 112.5  Home regimen consist of glimepiride and Actos  Hold home oral antidiabetic agents  Insulin on sliding scale  Carb consistent diet, once able to eat

## 2024-09-24 NOTE — ANESTHESIA PROCEDURE NOTES
Peripheral Block    Patient location during procedure: holding area  Reason for block: at surgeon's request and post-op pain management  Staffing  Performed by: Twan Maradiaga MD  Authorized by: Twan Maradiaga MD    Preanesthetic Checklist  Completed: patient identified, IV checked, site marked, risks and benefits discussed, surgical consent, monitors and equipment checked, pre-op evaluation and timeout performed  Peripheral Block  Patient position: supine  Prep: ChloraPrep  Patient monitoring: frequent blood pressure checks, continuous pulse oximetry and heart rate  Block type: PENG  Laterality: right  Injection technique: single-shot  Procedures: ultrasound guided, Ultrasound guidance required for the procedure to increase accuracy and safety of medication placement and decrease risk of complications.  bupivacaine (PF) (MARCAINE) 0.25 % injection 20 mL - Perineural   20 mL - 9/24/2024 5:45:00 PM  fentanyl citrate (PF) 100 MCG/2ML 50 mcg - Intravenous   75 mcg - 9/24/2024 5:45:00 PM  Needle  Needle type: Stimuplex   Needle gauge: 20 G  Needle length: 4 in  Needle localization: anatomical landmarks and ultrasound guidance  Assessment  Injection assessment: incremental injection, frequent aspiration, injected with ease, negative aspiration, negative for heart rate change, no paresthesia on injection, no symptoms of intraneural/intravenous injection and needle tip visualized at all times  Paresthesia pain: none  Post-procedure:  site cleaned  patient tolerated the procedure well with no immediate complications

## 2024-09-24 NOTE — ASSESSMENT & PLAN NOTE
Resulted after a mechanical fall on right thigh  X-rays showing right femoral intertrochanteric fracture  Ortho consult with plans for surgery this afternoon  N.p.o., PT and OT to evaluate  Pain control as needed

## 2024-09-24 NOTE — H&P
"H&P - Hospitalist   Name: La Garcia 74 y.o. female I MRN: [id number]  Unit/Bed#: ED 16 I Date of Admission: 9/23/2024   Date of Service: 9/23/2024 I Hospital Day: 0     Assessment & Plan  Fracture of neck of right femur (HCC)  Resulted after a mechanical fall on right thigh  X-rays completed, final reading pending  Case discussed with on-call orthopedic physician    N.p.o.  Analgesics as needed  Plan for OR in a.m.  Postoperative PT/OT  Postoperative monitoring for ABLA  Fall  Presented with a mechanical fall on her right thigh  Denies loss of consciousness or head strike  X-ray hip/femur-right, shows concern for right femur neck fracture, final read pending    Fall/safety precautions  See under fracture of right femur  Type 2 diabetes mellitus, without long-term current use of insulin ([facility])  Lab Results   Component Value Date    HGBA1C 6.6 (H) 04/24/2024       No results for input(s): \"POCGLU\" in the last 72 hours.    Blood Sugar Average: Last 72 hrs:    Home regimen consist of glimepiride and Actos  Hold home oral antidiabetic agents  Insulin on sliding scale  Carb consistent diet, once able to eat    Class 2 obesity due to excess calories in adult  Counseling on lifestyle modifications  Primary hypertension  BP (!) 187/81   Pulse 78   Temp 98.6 °F (37 °C) (Oral)   Resp 18   Ht 5' 2\" (1.575 m)   Wt 96.2 kg (212 lb)   SpO2 98%   BMI 38.78 kg/m²     Blood pressure elevated at the time of arrival, secondary to pain  Home regimen consist of amlodipine 5 mg daily, continue inpatient  Vitals as per protocol  Stage 4 chronic kidney disease (HCC)  Lab Results   Component Value Date    EGFR 27 09/23/2024    EGFR 26 (L) 08/01/2024    EGFR 27 (L) 04/24/2024    CREATININE 1.79 (H) 09/23/2024    CREATININE 1.95 (H) 08/01/2024    CREATININE 1.90 (H) 04/24/2024     Current creatinine within baseline  Monitor BUNs/creatinine  Avoid nephrotoxic agents/NSAIDs/relative hypotension  Urinary retention protocol    Mixed " hyperlipidemia  Intolerant to statin  Outpatient PCP follow-up    VTE Pharmacologic Prophylaxis:   Moderate Risk (Score 3-4) - Pharmacological DVT Prophylaxis Ordered: heparin.  Code Status: Level 1 - Full Code patient  Discussion with family:  not available .     Anticipated Length of Stay: Patient will be admitted on an inpatient basis with an anticipated length of stay of greater than 2 midnights secondary to fall, hip /femur fracture .    History of Present Illness   Chief Complaint:   Chief Complaint   Patient presents with    Fall     R thigh pain after mechanical fall, no head strike.          La Garcia is a 74 y.o. female with a PMH of morbid obesity, hypertension, hyperlipidemia, type 2 diabetes mellitus presented to the ED after a mechanical fall.  She is a resident of [facility].  On my encounter, she is awake, alert and hemodynamically stable.  Endorses significant pain over right lateral thigh.  Her right leg is rotated/shortened.  Denies any chest pain or shortness of breath.  Denies any loss of consciousness or head strike.  Patient confirms level 1 full CODE STATUS.  Patient deemed her son, as her primary/emergency contact.    Review of Systems   Constitutional:  Positive for activity change. Negative for chills and fever.   HENT:  Negative for congestion.    Respiratory:  Negative for cough and shortness of breath.    Cardiovascular:  Negative for chest pain, palpitations and leg swelling.   Gastrointestinal:  Negative for abdominal pain.   Genitourinary:  Negative for dysuria.   Musculoskeletal:  Positive for gait problem.   Neurological:  Positive for weakness.   Psychiatric/Behavioral:  Negative for agitation and confusion.        I have reviewed the patient's PMH, PSH, Social History, Family History, Meds, and Allergies  Historical Information   Past Medical History:   Diagnosis Date    Arthritis     Diabetes mellitus (HCC)     Hypertension     Renal disorder      Past Surgical History:  "  Procedure Laterality Date    APPENDECTOMY      HERNIA REPAIR       Social History     Tobacco Use    Smoking status: Never    Smokeless tobacco: Never   Vaping Use    Vaping status: Never Used   Substance and Sexual Activity    Alcohol use: Not Currently    Drug use: Not Currently    Sexual activity: Not on file     E-Cigarette/Vaping    E-Cigarette Use Never User      E-Cigarette/Vaping Substances     History reviewed. No pertinent family history.  Social History     Tobacco Use    Smoking status: Never    Smokeless tobacco: Never   Vaping Use    Vaping status: Never Used   Substance and Sexual Activity    Alcohol use: Not Currently    Drug use: Not Currently    Sexual activity: Not on file       Current Facility-Administered Medications:     acetaminophen (TYLENOL) tablet 650 mg, Q6H PRN    [START ON 9/24/2024] amLODIPine (NORVASC) tablet 5 mg, Daily    [START ON 9/24/2024] docusate sodium (COLACE) capsule 100 mg, BID    heparin (porcine) subcutaneous injection 5,000 Units, Q8H MORIS    HYDROmorphone HCl (DILAUDID) injection 0.2 mg, Q3H PRN    [START ON 9/24/2024] insulin lispro (HumALOG/ADMELOG) 100 units/mL subcutaneous injection 1-5 Units, TID AC **AND** [START ON 9/24/2024] Fingerstick Glucose (POCT), TID AC    insulin lispro (HumALOG/ADMELOG) 100 units/mL subcutaneous injection 1-5 Units, HS    lidocaine (LIDODERM) 5 % patch 1 patch, Once    ondansetron (ZOFRAN) injection 4 mg, Q6H PRN    oxyCODONE (ROXICODONE) IR tablet 5 mg, Once    oxyCODONE (ROXICODONE) IR tablet 5 mg, Q4H PRN    tranexamic Acid 690 mg in sodium chloride 0.9 % 100 mL IVPB, Once  None     Patient has no known allergies.  Social History:  Marital Status:        Objective     Vitals:   Blood Pressure: (!) 187/81 (09/23/24 2203)  Pulse: 78 (09/23/24 2201)  Temperature: 98.6 °F (37 °C) (09/23/24 2159)  Temp Source: Oral (09/23/24 2159)  Respirations: 18 (09/23/24 2201)  Height: 5' 2\" (157.5 cm) (09/23/24 2201)  Weight - Scale: 96.2 kg " (212 lb) (09/23/24 2201)  SpO2: 98 % (09/23/24 2201)    Physical Exam  Vitals reviewed.   Constitutional:       Appearance: She is obese.   HENT:      Head: Atraumatic.      Mouth/Throat:      Mouth: Mucous membranes are dry.   Cardiovascular:      Rate and Rhythm: Normal rate and regular rhythm.      Heart sounds: Normal heart sounds.   Pulmonary:      Effort: No respiratory distress.   Abdominal:      General: Bowel sounds are normal.      Tenderness: There is no abdominal tenderness.   Musculoskeletal:         General: Tenderness and deformity present.   Skin:     Capillary Refill: Capillary refill takes less than 2 seconds.   Neurological:      Mental Status: She is alert and oriented to person, place, and time.      Motor: Weakness present.   Psychiatric:         Mood and Affect: Mood normal.         Lines/Drains:  Lines/Drains/Airways       Active Status       None                        Additional Data:   Lab Results: I have reviewed the following results:   Results from last 7 days   Lab Units 09/23/24  2255   WBC Thousand/uL 11.09*   HEMOGLOBIN g/dL 10.8*   HEMATOCRIT % 34.1*   PLATELETS Thousands/uL 237   SEGS PCT % 80*   LYMPHO PCT % 13*   MONO PCT % 6   EOS PCT % 1     Results from last 7 days   Lab Units 09/23/24  2255   SODIUM mmol/L 137   POTASSIUM mmol/L 5.0   CHLORIDE mmol/L 108   CO2 mmol/L 21   BUN mg/dL 43*   CREATININE mg/dL 1.79*   ANION GAP mmol/L 8   CALCIUM mg/dL 9.7   GLUCOSE RANDOM mg/dL 128     Results from last 7 days   Lab Units 09/23/24  2255   INR  1.15         Lab Results   Component Value Date    HGBA1C 6.6 (H) 04/24/2024    HGBA1C 6.5 (H) 11/08/2023    HGBA1C 6.5 (H) 06/28/2022           Imaging Review: Reviewed radiology reports from this admission including: xray(s).  Other Studies: EKG was reviewed.     Administrative Statements   I have spent a total time of 75 minutes in caring for this patient on the day of the visit/encounter including Prognosis, Patient and family  education, Counseling / Coordination of care, Documenting in the medical record, Reviewing / ordering tests, medicine, procedures  , and Obtaining or reviewing history  .    ** Please Note: This note has been constructed using a voice recognition system. **

## 2024-09-25 LAB
ANION GAP SERPL CALCULATED.3IONS-SCNC: 12 MMOL/L (ref 4–13)
BUN SERPL-MCNC: 46 MG/DL (ref 5–25)
CALCIUM SERPL-MCNC: 9.9 MG/DL (ref 8.4–10.2)
CHLORIDE SERPL-SCNC: 105 MMOL/L (ref 96–108)
CO2 SERPL-SCNC: 19 MMOL/L (ref 21–32)
CREAT SERPL-MCNC: 2.35 MG/DL (ref 0.6–1.3)
DME PARACHUTE DELIVERY DATE ACTUAL: NORMAL
DME PARACHUTE DELIVERY DATE REQUESTED: NORMAL
DME PARACHUTE ITEM DESCRIPTION: NORMAL
DME PARACHUTE ORDER STATUS: NORMAL
DME PARACHUTE SUPPLIER NAME: NORMAL
DME PARACHUTE SUPPLIER PHONE: NORMAL
ERYTHROCYTE [DISTWIDTH] IN BLOOD BY AUTOMATED COUNT: 14.2 % (ref 11.6–15.1)
GFR SERPL CREATININE-BSD FRML MDRD: 19 ML/MIN/1.73SQ M
GLUCOSE SERPL-MCNC: 244 MG/DL (ref 65–140)
GLUCOSE SERPL-MCNC: 250 MG/DL (ref 65–140)
GLUCOSE SERPL-MCNC: 253 MG/DL (ref 65–140)
GLUCOSE SERPL-MCNC: 322 MG/DL (ref 65–140)
GLUCOSE SERPL-MCNC: 356 MG/DL (ref 65–140)
HCT VFR BLD AUTO: 30.9 % (ref 34.8–46.1)
HGB BLD-MCNC: 9.4 G/DL (ref 11.5–15.4)
MCH RBC QN AUTO: 29.4 PG (ref 26.8–34.3)
MCHC RBC AUTO-ENTMCNC: 30.4 G/DL (ref 31.4–37.4)
MCV RBC AUTO: 97 FL (ref 82–98)
PLATELET # BLD AUTO: 213 THOUSANDS/UL (ref 149–390)
PMV BLD AUTO: 9.9 FL (ref 8.9–12.7)
POTASSIUM SERPL-SCNC: 5.1 MMOL/L (ref 3.5–5.3)
RBC # BLD AUTO: 3.2 MILLION/UL (ref 3.81–5.12)
SODIUM SERPL-SCNC: 136 MMOL/L (ref 135–147)
WBC # BLD AUTO: 9.17 THOUSAND/UL (ref 4.31–10.16)

## 2024-09-25 PROCEDURE — 97163 PT EVAL HIGH COMPLEX 45 MIN: CPT

## 2024-09-25 PROCEDURE — 82948 REAGENT STRIP/BLOOD GLUCOSE: CPT

## 2024-09-25 PROCEDURE — 99024 POSTOP FOLLOW-UP VISIT: CPT | Performed by: PHYSICIAN ASSISTANT

## 2024-09-25 PROCEDURE — 80048 BASIC METABOLIC PNL TOTAL CA: CPT | Performed by: INTERNAL MEDICINE

## 2024-09-25 PROCEDURE — 97167 OT EVAL HIGH COMPLEX 60 MIN: CPT

## 2024-09-25 PROCEDURE — 85027 COMPLETE CBC AUTOMATED: CPT | Performed by: INTERNAL MEDICINE

## 2024-09-25 PROCEDURE — 99232 SBSQ HOSP IP/OBS MODERATE 35: CPT | Performed by: STUDENT IN AN ORGANIZED HEALTH CARE EDUCATION/TRAINING PROGRAM

## 2024-09-25 PROCEDURE — 97110 THERAPEUTIC EXERCISES: CPT

## 2024-09-25 PROCEDURE — 97530 THERAPEUTIC ACTIVITIES: CPT

## 2024-09-25 RX ORDER — AMOXICILLIN 250 MG
1 CAPSULE ORAL 2 TIMES DAILY
Status: DISCONTINUED | OUTPATIENT
Start: 2024-09-25 | End: 2024-09-27 | Stop reason: HOSPADM

## 2024-09-25 RX ORDER — INSULIN GLARGINE 100 [IU]/ML
5 INJECTION, SOLUTION SUBCUTANEOUS
Status: DISCONTINUED | OUTPATIENT
Start: 2024-09-25 | End: 2024-09-25

## 2024-09-25 RX ORDER — POLYETHYLENE GLYCOL 3350 17 G/17G
17 POWDER, FOR SOLUTION ORAL DAILY
Status: DISCONTINUED | OUTPATIENT
Start: 2024-09-25 | End: 2024-09-26

## 2024-09-25 RX ORDER — SODIUM CHLORIDE, SODIUM GLUCONATE, SODIUM ACETATE, POTASSIUM CHLORIDE, MAGNESIUM CHLORIDE, SODIUM PHOSPHATE, DIBASIC, AND POTASSIUM PHOSPHATE .53; .5; .37; .037; .03; .012; .00082 G/100ML; G/100ML; G/100ML; G/100ML; G/100ML; G/100ML; G/100ML
75 INJECTION, SOLUTION INTRAVENOUS CONTINUOUS
Status: DISCONTINUED | OUTPATIENT
Start: 2024-09-25 | End: 2024-09-26

## 2024-09-25 RX ORDER — INSULIN GLARGINE 100 [IU]/ML
10 INJECTION, SOLUTION SUBCUTANEOUS EVERY MORNING
Status: DISCONTINUED | OUTPATIENT
Start: 2024-09-25 | End: 2024-09-27 | Stop reason: HOSPADM

## 2024-09-25 RX ADMIN — HEPARIN SODIUM 5000 UNITS: 5000 INJECTION, SOLUTION INTRAVENOUS; SUBCUTANEOUS at 18:59

## 2024-09-25 RX ADMIN — OXYCODONE HYDROCHLORIDE 5 MG: 5 TABLET ORAL at 08:24

## 2024-09-25 RX ADMIN — INSULIN LISPRO 3 UNITS: 100 INJECTION, SOLUTION INTRAVENOUS; SUBCUTANEOUS at 12:39

## 2024-09-25 RX ADMIN — DOCUSATE SODIUM 100 MG: 100 CAPSULE, LIQUID FILLED ORAL at 08:12

## 2024-09-25 RX ADMIN — OXYCODONE HYDROCHLORIDE 5 MG: 5 TABLET ORAL at 19:09

## 2024-09-25 RX ADMIN — OXYCODONE HYDROCHLORIDE 5 MG: 5 TABLET ORAL at 04:10

## 2024-09-25 RX ADMIN — Medication 1000 UNITS: at 08:12

## 2024-09-25 RX ADMIN — SODIUM CHLORIDE, SODIUM GLUCONATE, SODIUM ACETATE, POTASSIUM CHLORIDE, MAGNESIUM CHLORIDE, SODIUM PHOSPHATE, DIBASIC, AND POTASSIUM PHOSPHATE 75 ML/HR: .53; .5; .37; .037; .03; .012; .00082 INJECTION, SOLUTION INTRAVENOUS at 12:23

## 2024-09-25 RX ADMIN — INSULIN GLARGINE 10 UNITS: 100 INJECTION, SOLUTION SUBCUTANEOUS at 18:59

## 2024-09-25 RX ADMIN — INSULIN LISPRO 2 UNITS: 100 INJECTION, SOLUTION INTRAVENOUS; SUBCUTANEOUS at 22:10

## 2024-09-25 RX ADMIN — AMLODIPINE BESYLATE 5 MG: 5 TABLET ORAL at 08:12

## 2024-09-25 RX ADMIN — SODIUM ZIRCONIUM CYCLOSILICATE 10 G: 10 POWDER, FOR SUSPENSION ORAL at 12:23

## 2024-09-25 RX ADMIN — INSULIN LISPRO 2 UNITS: 100 INJECTION, SOLUTION INTRAVENOUS; SUBCUTANEOUS at 08:13

## 2024-09-25 RX ADMIN — SENNOSIDES AND DOCUSATE SODIUM 1 TABLET: 50; 8.6 TABLET ORAL at 18:58

## 2024-09-25 RX ADMIN — HEPARIN SODIUM 5000 UNITS: 5000 INJECTION, SOLUTION INTRAVENOUS; SUBCUTANEOUS at 00:13

## 2024-09-25 RX ADMIN — CEFAZOLIN SODIUM 1000 MG: 1 SOLUTION INTRAVENOUS at 01:12

## 2024-09-25 RX ADMIN — INSULIN LISPRO 4 UNITS: 100 INJECTION, SOLUTION INTRAVENOUS; SUBCUTANEOUS at 19:00

## 2024-09-25 RX ADMIN — POLYETHYLENE GLYCOL 3350 17 G: 17 POWDER, FOR SOLUTION ORAL at 12:23

## 2024-09-25 RX ADMIN — SENNOSIDES AND DOCUSATE SODIUM 1 TABLET: 50; 8.6 TABLET ORAL at 12:23

## 2024-09-25 RX ADMIN — HEPARIN SODIUM 5000 UNITS: 5000 INJECTION, SOLUTION INTRAVENOUS; SUBCUTANEOUS at 08:13

## 2024-09-25 RX ADMIN — HEPARIN SODIUM 5000 UNITS: 5000 INJECTION, SOLUTION INTRAVENOUS; SUBCUTANEOUS at 23:48

## 2024-09-25 NOTE — PLAN OF CARE
Problem: PHYSICAL THERAPY ADULT  Goal: Performs mobility at highest level of function for planned discharge setting.  See evaluation for individualized goals.  Description: Treatment/Interventions: Functional transfer training, LE strengthening/ROM, Therapeutic exercise, Endurance training, Patient/family training, Equipment eval/education, Bed mobility, Gait training          See flowsheet documentation for full assessment, interventions and recommendations.  Outcome: Progressing  Note: Prognosis: Fair  Problem List: Decreased strength, Decreased range of motion, Decreased endurance, Impaired balance, Decreased mobility, Impaired judgement, Decreased safety awareness, Obesity, Orthopedic restrictions, Pain  Assessment: Orders for PT eval and treat received. Pt's PMHx: tori knee OA, left ankle OA. Pt exhibits physical deficits noted in problem list above.  Deficits listed contribute to functional limitations that are significant from the patient PLOF and include: difficulty with bed mobility, impaired standing balance, inability to perform safe transfers, tolerance for OOB functional activities, unsafe/inefficient ambulation, and fall risk    Additionally, patient is limited by restrictions/precautions/DME: RLE WBAT.    Additional considerations affecting pt's discharge planning: Insufficient or unavailable assistance at home, Inability to manage basic self care ADLs with the assistance that is currently available, and Inability to perform necessary transfers and/or mobility out of bed, with the assistance that is currently available    During today's session, formal PT evaluations performed.  Initiated session with supine LE AAROM exercises to improve joint motion and pain management.  Eduated and performed bed repositioning with hospital bed features, bed transfers with rails, and scooting EOB.  Extra time required at EOB for scooting and to manage dizziness, while BP was monitored.  Progressed activity to  standing with walker, needed max cueing for safety and proper utilization of walker.  Gait limited due to onset of symptoms, but pt was able to take a few steps with assistance to recliner.  Pt does not display ability to manage basic transfers and mobility, without significant assistance, and would not be safe to discharge home at this time.  Recommending skilled rehab facility placement at this time.     The -PAC Mobility Score was used to assist in determining pt safety w/ mobility/self care & appropriate d/c recommendations, see above for scores. Patient's clinical presentation is evolving due to significant acute change in functional independence, uncontrolled pain, significant need for care assistance compared to baseline, recent surgery/procedure, ongoing medical management needs, and complicated social/support system.     From a PT/mobility standpoint given the above findings, DC recommendation is level: II (Moderate Rehab Resource Intensity)    Considering the patient's PLOF, co-morbidities, acute functional limitations, functional outcome measures, and/or goal to progress functional independence; this patient would benefit from skilled Physical Therapy intervention in the acute care setting.  Barriers to Discharge: Decreased caregiver support     Rehab Resource Intensity Level, PT: II (Moderate Resource Intensity)    See flowsheet documentation for full assessment.

## 2024-09-25 NOTE — ASSESSMENT & PLAN NOTE
Lab Results   Component Value Date    HGBA1C 6.8 (H) 09/23/2024       Recent Labs     09/24/24  1059 09/24/24  1548 09/24/24 2110 09/25/24  0712   POCGLU 85 92 197* 244*       Blood Sugar Average: Last 72 hrs:  (P) 140.5  Home regimen consist of glimepiride and Actos  Hold home oral antidiabetic agents  Start Lantus 5 units at bedtime, sliding scale and low-carb diet

## 2024-09-25 NOTE — OCCUPATIONAL THERAPY NOTE
"    Occupational Therapy Evaluation      Patient Name: La Garcia  Today's Date: 9/25/2024  Problem List  Principal Problem:    Fracture of neck of right femur (HCC)  Active Problems:    Fall    Type 2 diabetes mellitus, without long-term current use of insulin (HCC)    Class 2 obesity due to excess calories in adult    Primary hypertension    Stage 4 chronic kidney disease (HCC)    Mixed hyperlipidemia    Past Medical History  Past Medical History:   Diagnosis Date    Arthritis     Diabetes mellitus (HCC)     Hypertension     Renal disorder      Past Surgical History  Past Surgical History:   Procedure Laterality Date    APPENDECTOMY      CHOLECYSTECTOMY      HERNIA REPAIR      PA OPTX FEM SHFT FX W/INSJ IMED IMPLT W/WO SCREW Right 9/24/2024    Procedure: INSERTION NAIL IM FEMUR ANTEGRADE (TROCHANTERIC)- right;  Surgeon: John Peters DO;  Location:  MAIN OR;  Service: Orthopedics         09/25/24 6326   OT Last Visit   OT Visit Date 09/25/24   Note Type   Note type Evaluation   Pain Assessment   Pain Assessment Tool 0-10   Pain Score 7   Pain Location/Orientation Orientation: Right;Location: Hip;Orientation: Lower;Location: Back   Hospital Pain Intervention(s) Repositioned;Rest   Restrictions/Precautions   Weight Bearing Precautions Per Order Yes   RLE Weight Bearing Per Order WBAT   Other Precautions Fall Risk;Pain;WBS;Chair Alarm   Home Living   Type of Home Apartment  (Senior apt-55+ community)   Home Layout One level;Elevator  (0 BENNY)   Bathroom Shower/Tub Tub/shower unit   Bathroom Equipment Grab bars in shower;Shower chair   Home Equipment Cane   Prior Function   Level of [place] Independent with ADLs;Independent with functional mobility;Independent with IADLS   Lives With Alone   IADLs Independent with driving;Independent with meal prep;Independent with medication management   Falls in the last 6 months 1 to 4   Subjective   Subjective \"Both my legs are bad\"   ADL   Eating Assistance 7  " Independent   Grooming Assistance 7  Independent   UB Bathing Assistance 5  Supervision/Setup   LB Bathing Assistance 2  Maximal Assistance   UB Dressing Assistance 5  Supervision/Setup   LB Dressing Assistance 2  Maximal Assistance   Toileting Assistance  2  Maximal Assistance   Bed Mobility   Supine to Sit 3  Moderate assistance   Additional items Assist x 2;Verbal cues;Increased time required   Transfers   Sit to Stand 2  Maximal assistance   Additional items Assist x 2;Verbal cues   Stand to Sit 3  Moderate assistance   Additional items Assist x 2;Verbal cues;Increased time required   Stand pivot 2  Maximal assistance   Additional items Assist x 2;Verbal cues  (RW)   Additional Comments Pt very anxious t/o. Pt benefited from VC to encourage as well as maintain appropriate posture/body mechanics.   Balance   Static Sitting Fair   Dynamic Sitting Fair   Static Standing Poor   Dynamic Standing Poor   Activity Tolerance   Activity Tolerance Patient limited by fatigue;Patient limited by pain  (BP sitting /69. During standing activity, pt endorsed dizzinesss. Once seated in recliner /63)   Medical Staff Made Aware PT [place].   RUE Assessment   RUE Assessment WFL   LUE Assessment   LUE Assessment WFL   Cognition   Overall Cognitive Status WFL   Arousal/Participation Alert   Attention Within functional limits   Orientation Level Oriented X4   Memory Decreased recall of precautions   Following Commands Follows one step commands with increased time or repetition   Assessment   Limitation Decreased ADL status;Decreased self-care trans;Decreased high-level ADLs;Decreased endurance   Prognosis Fair   Assessment Pt is a 74 y.o. female seen for OT evaluation at [facility], admitted 9/23/2024 w/ Fracture of neck of right femur (HCC).  Pt now s/p  R short IMN. Comorbidities affecting pt's functional performance at time of assessment include: type II DM, generalized anxiety, primary HTN, stress  incontinence.  Prior to admission, pt was living alone in [facility].  Pt was I w/  ADLS and IADLS,  & required cane PTA. Upon evaluation, pt appears to be performing below baseline functional status. Pt currently requires mod AX 2 for bed mobility, mod-max Ax 2 for functional mobility/transfers, sup for UB ADLs and max A for LB ADLS 2* the following deficits impacting occupational performance: weakness, decreased strength, decreased balance, decreased tolerance, and increased pain. Full objective findings from OT assessment regarding body systems outlined above. Personal factors affecting pt at time of IE include:limited home support, difficulty performing ADLS, difficulty performing IADLS , and decreased functional mobilty . These impairments, as well as risk for falls  limit pt's ability to safely engage in all baseline areas of occupation and mobility. Pt to benefit from continued skilled OT tx while in the hospital to address deficits as defined above and maximize level of functional independence w ADL's and functional mobility. Occupational Performance areas to address include: bathing/shower, toilet hygiene, dressing, functional mobility, and community mobility.  This evaluation required an extensive review of medical and/or therapy records and additional review of physical, cognitive and psychosocial history related to functional performance. Based upon functional performance deficits and assessments, this evaluation has been identified as a high complexity evaluation.  At this time, OT recommendations at time of discharge are level 2 mod intensity resources.   Goals   Patient Goals Pt wishes to get home   Plan   Treatment Interventions ADL retraining;Functional transfer training;Endurance training;Equipment evaluation/education;Compensatory technique education;Activityengagement   Goal Expiration Date 10/05/24   OT Treatment Day 0   OT Frequency 3-5x/wk   Discharge Recommendation   Rehab Resource Intensity  Level, OT II (Moderate Resource Intensity)   Additional Comments  The patient's raw score on the AM-PAC Daily Activity inpatient short form is 18, standardized score is 38.66, less than 39.4. Patients at this level are likely to benefit from DC to post-acute rehabilitation services. However please refer to therapist recommendation for discharge planning given other factors that may influence destination.   AM-PAC Daily Activity Inpatient   Lower Body Dressing 2   Bathing 2   Toileting 2   Upper Body Dressing 4   Grooming 4   Eating 4   Daily Activity Raw Score 18   Daily Activity Standardized Score (Calc for Raw Score >=11) 38.66   AM-PAC Applied Cognition Inpatient   Following a Speech/Presentation 3   Understanding Ordinary Conversation 4   Taking Medications 3   Remembering Where Things Are Placed or Put Away 4   Remembering List of 4-5 Errands 4   Taking Care of Complicated Tasks 3   Applied Cognition Raw Score 21   Applied Cognition Standardized Score 44.3   End of Consult   Education Provided Yes;Family or social support of family present for education by provider   Patient Position at End of Consult Bedside chair;Bed/Chair alarm activated;All needs within reach   Nurse Communication Nurse aware of consult       Pt will achieve the following goals within 10 days.    *Pt will complete LB bathing and dressing with min A .    * Pt will complete toileting w/ min A  w/ G hygiene/thoroughness using DME PRN    *Pt will complete bed mobility with min A x 1, with bed flat and no side rail to prep for purposeful tasks    *Pt will perform functional transfers with on/off all surfaces with min Ax 1 using DME as needed w/ G balance/safety.    *Pt will increase standing tolerance x 5  minutes for inc'd tolerance with standing purposeful tasks.    *Pt will participate in UE therapeutic exercise in order to maximize strength for ADL transfers.     *Pt will sit on EOB for 20 minutes for increased safety with seated activity  tolerance during ADL tasks.    *Pt will increase static/dynamic sitting balance to good to improve the ability to sit at edge of bed or on a chair for ADLS;      *Pt will increase static/dynamic standing balance to fair- to improve postural stability and decrease fall risk during standing ADLS and transfers.       *Pt will improve functional mobility during ADL/IADL/leisure tasks to min Ax 1 using DME as needed w/ G balance/safety.     Gabriela Ellison, OTR/L

## 2024-09-25 NOTE — ASSESSMENT & PLAN NOTE
"/69 (BP Location: Right arm)   Pulse 97   Temp 97.7 °F (36.5 °C) (Oral)   Resp 18   Ht 5' 2\" (1.575 m)   Wt 96 kg (211 lb 10.3 oz)   SpO2 99%   BMI 38.71 kg/m²     Blood pressure elevated at the time of arrival, secondary to pain  Continue amlodipine 5 mg daily with BP hold parameters  "

## 2024-09-25 NOTE — ASSESSMENT & PLAN NOTE
WBAT RLE  PT/OT  Pain control  Currently on herparin for DVT PPX, rec aspirin 325mg BID for 35 days on discharge

## 2024-09-25 NOTE — ASSESSMENT & PLAN NOTE
Lab Results   Component Value Date    EGFR 19 09/25/2024    EGFR 25 09/24/2024    EGFR 27 09/23/2024    CREATININE 2.35 (H) 09/25/2024    CREATININE 1.87 (H) 09/24/2024    CREATININE 1.79 (H) 09/23/2024     Current creatinine within baseline  Slightly increased creatinine from baseline  Will give gentle IV fluids today

## 2024-09-25 NOTE — PHYSICAL THERAPY NOTE
PHYSICAL THERAPY Evaluation and Treatment  DATE: 09/25/24  TIME: 4719-9233    NAME:  La Garcia  AGE:   74 y.o.  Mrn:   [id number]  Length Of Stay: 2    ADMIT DX:  Hip fracture (HCC) [S72.009A]  Weakness [R53.1]  Fall, initial encounter [W19.XXXA]    Past Medical History:   Diagnosis Date    Arthritis     Diabetes mellitus (HCC)     Hypertension     Renal disorder      Past Surgical History:   Procedure Laterality Date    APPENDECTOMY      CHOLECYSTECTOMY      HERNIA REPAIR      [place] OPTX FEM SHFT FX W/INSJ IMED IMPLT W/WO SCREW Right 9/24/2024    Procedure: INSERTION NAIL IM FEMUR ANTEGRADE (TROCHANTERIC)- right;  Surgeon: John Peters DO;  Location:  MAIN OR;  Service: Orthopedics        09/25/24 1303   PT Last Visit   PT Visit Date 09/25/24   Note Type   Note type Evaluation   Additional Comments 75 y/o presents following a fall with subsequent right hip pain.  Imagining confirms, right femoral neck fx.  Pt s/p right IM femur nailing on 9/24.   Pain Assessment   Pain Assessment Tool 0-10   Pain Score 7   Pain Location/Orientation   (right hip, low back)   Hospital Pain Intervention(s) Repositioned;Ambulation/increased activity;Rest   Restrictions/Precautions   Weight Bearing Precautions Per Order Yes   RLE Weight Bearing Per Order WBAT   Other Precautions Fall Risk;Pain;WBS;Bed Alarm;Chair Alarm   Home Living   Additional Comments Pt lives alone in a 55+ senior living facility, 0 BENNY.  DME: SPC   Prior Function   Comments Prior to admission: ambulates with a SPC, independent with ADL, IADLs, drive (+), grocery shopping.  son local to assist PRN.  Hobbies: bake, follow sports   General   Family/Caregiver Present Yes   Cognition   Overall Cognitive Status WFL   Arousal/Participation Alert   Orientation Level Oriented X4   Following Commands Follows one step commands with increased time or repetition   Subjective   Subjective Pt reports pain is well controlled when resting, but c/o significant pain with  movement or while laying flat.  Pt also reporting worsening dizziness upon getting to sitting and/or standing.   RUE Assessment   RUE Assessment WFL   LUE Assessment   LUE Assessment WFL   RLE Assessment   RLE Assessment   (hip ROM WFL, strength 2-/5 grossly.  knee AROM ~5-55 deg.  ankle ROM WFL and strength grossly 4-/5.)   LLE Assessment   LLE Assessment   (hip ROM WFL, strength 3+/5 grossly. knee AROM ~5-55 deg. ankle ROM WFL and strength grossly 4-/5.)   Coordination   Movements are Fluid and Coordinated 1   Sensation WFL   Bed Mobility   Rolling L 3  Moderate assistance   Additional items Assist x 2;Bedrails;Increased time required;Impulsive;Verbal cues;LE management   Supine to Sit 3  Moderate assistance   Additional items Assist x 2;Bedrails;Increased time required;Impulsive;Verbal cues;LE management   Transfers   Sit to Stand 2  Maximal assistance   Additional items Assist x 2;Bedrails;Increased time required;Impulsive;Verbal cues  (RW in standing)   Stand to Sit 3  Moderate assistance   Additional items Assist x 2;Armrests;Increased time required;Impulsive;Verbal cues   Stand pivot 2  Maximal assistance   Additional items Assist x 2;Increased time required;Impulsive;Verbal cues  (RW)   Additional Comments Pt struggles to maintain walker/posture management or appropriately advance step.  pt required signficant assist for balance and to coordinate movement to chair.   Balance   Static Sitting Fair   Static Standing Poor   Dynamic Standing Poor   Ambulatory Poor  (RW)   Endurance Deficit   Endurance Deficit Yes   Activity Tolerance   Activity Tolerance Patient limited by fatigue;Patient limited by pain   Medical Staff Made Aware collaborated with OT   Assessment   Prognosis Fair   Problem List Decreased strength;Decreased range of motion;Decreased endurance;Impaired balance;Decreased mobility;Impaired judgement;Decreased safety awareness;Obesity;Orthopedic restrictions;Pain   Assessment Orders for PT eval and  treat received. Pt's PMHx: tori knee OA, left ankle OA. Pt exhibits physical deficits noted in problem list above.  Deficits listed contribute to functional limitations that are significant from the patient PLOF and include: difficulty with bed mobility, impaired standing balance, inability to perform safe transfers, tolerance for OOB functional activities, unsafe/inefficient ambulation, and fall risk    Additionally, patient is limited by restrictions/precautions/DME: RLE WBAT.    Additional considerations affecting pt's discharge planning: Insufficient or unavailable assistance at home, Inability to manage basic self care ADLs with the assistance that is currently available, and Inability to perform necessary transfers and/or mobility out of bed, with the assistance that is currently available    During today's session, formal PT evaluations performed.  Initiated session with supine LE AAROM exercises to improve joint motion and pain management.  Eduated and performed bed repositioning with hospital bed features, bed transfers with rails, and scooting EOB.  Extra time required at EOB for scooting and to manage dizziness, while BP was monitored.  Progressed activity to standing with walker, needed max cueing for safety and proper utilization of walker.  Gait limited due to onset of symptoms, but pt was able to take a few steps with assistance to recliner.  Pt does not display ability to manage basic transfers and mobility, without significant assistance, and would not be safe to discharge home at this time.  Recommending skilled rehab facility placement at this time.     The -PAC Mobility Score was used to assist in determining pt safety w/ mobility/self care & appropriate d/c recommendations, see above for scores. Patient's clinical presentation is evolving due to significant acute change in functional independence, uncontrolled pain, significant need for care assistance compared to baseline, recent  surgery/procedure, ongoing medical management needs, and complicated social/support system.     From a PT/mobility standpoint given the above findings, DC recommendation is level: II (Moderate Rehab Resource Intensity)    Considering the patient's PLOF, co-morbidities, acute functional limitations, functional outcome measures, and/or goal to progress functional independence; this patient would benefit from skilled Physical Therapy intervention in the acute care setting.   Barriers to Discharge Decreased caregiver support   Goals   Patient Goals return home   STG Expiration Date 10/05/24   Short Term Goal #1 1: Pt will perform supine<>sit transfer on flat bed, mod I.  2: Pt will perform stand pivot transfer with RW, mod I.  3: Pt will ambulate 150' with reciprocal gait and appropriate pace using RW, mod I. 4: Pt will perform written HEP, mod I.   Plan   Treatment/Interventions Functional transfer training;LE strengthening/ROM;Therapeutic exercise;Endurance training;Patient/family training;Equipment eval/education;Bed mobility;Gait training   PT Frequency 3-5x/wk   Discharge Recommendation   Rehab Resource Intensity Level, PT II (Moderate Resource Intensity)   AM-PAC Basic Mobility Inpatient   Turning in Flat Bed Without Bedrails 3   Lying on Back to Sitting on Edge of Flat Bed Without Bedrails 2   Moving Bed to Chair 2   Standing Up From Chair Using Arms 2   Walk in Room 2   Climb 3-5 Stairs With Railing 1   Basic Mobility Inpatient Raw Score 12   Basic Mobility Standardized Score 32.23   [facility] Highest Level Of Mobility   -[facility] Goal 4: Move to chair/commode   -HLM Achieved 4: Move to chair/commode   Additional Treatment Session   Start Time 1313   End Time 1340   Treatment Assessment Discussed at length about pain management strategies, postural corrections, proper use of RW, gait/stair training corrections, importance of gaining AROM, benefit of frequent tolerable physical activity/exercise, and fall risk  precautions.  Provided caregiver training and tips as appropriate and needed.  Initiated this session with supine AAROM exercises, which pt did seem to tolerate.  Session than progressed to education and instruction on bed mobility, transfers, and use of walker.  Pt exhibits poor tolerance this session with adverse symptoms that limit her functional activity.   Exercises   Heelslides Supine;15 reps;AAROM;Right;Left   Hip Flexion Supine;15 reps;AROM;Right   Hip Abduction Supine;15 reps;AROM;Right   Hip Adduction Supine;15 reps;AAROM;Right   Marching Standing;5 reps;AROM;Right  (RW)   End of Consult   Patient Position at End of Consult Bedside chair;Bed/Chair alarm activated;All needs within reach   The patient's AM-PAC Basic Mobility Inpatient Short Form Raw Score is 12. A Raw score of less than 16 suggests the patient may benefit from discharge to post-acute rehabilitation services. Please also refer to the recommendation of the Physical Therapist for safe discharge planning.    Dustin Amaya, PT, DPT  [place] Licensure #[id number]

## 2024-09-25 NOTE — PLAN OF CARE
Problem: OCCUPATIONAL THERAPY ADULT  Goal: Performs self-care activities at highest level of function for planned discharge setting.  See evaluation for individualized goals.  Description: Treatment Interventions: ADL retraining, Functional transfer training, Endurance training, Equipment evaluation/education, Compensatory technique education, Activityengagement          See flowsheet documentation for full assessment, interventions and recommendations.   Note: Limitation: Decreased ADL status, Decreased self-care trans, Decreased high-level ADLs, Decreased endurance  Prognosis: Fair  Assessment: Pt is a 74 y.o. female seen for OT evaluation at [facility], admitted 9/23/2024 w/ Fracture of neck of right femur (HCC).  Pt now s/p  R short IMN. Comorbidities affecting pt's functional performance at time of assessment include: type II DM, generalized anxiety, primary HTN, stress incontinence.  Prior to admission, pt was living alone in [facility].  Pt was I w/  ADLS and IADLS,  & required cane PTA. Upon evaluation, pt appears to be performing below baseline functional status. Pt currently requires mod AX 2 for bed mobility, mod-max Ax 2 for functional mobility/transfers, sup for UB ADLs and max A for LB ADLS 2* the following deficits impacting occupational performance: weakness, decreased strength, decreased balance, decreased tolerance, and increased pain. Full objective findings from OT assessment regarding body systems outlined above. Personal factors affecting pt at time of IE include:limited home support, difficulty performing ADLS, difficulty performing IADLS , and decreased functional mobilty . These impairments, as well as risk for falls  limit pt's ability to safely engage in all baseline areas of occupation and mobility. Pt to benefit from continued skilled OT tx while in the hospital to address deficits as defined above and maximize level of functional independence w ADL's and functional  mobility. Occupational Performance areas to address include: bathing/shower, toilet hygiene, dressing, functional mobility, and community mobility.  This evaluation required an extensive review of medical and/or therapy records and additional review of physical, cognitive and psychosocial history related to functional performance. Based upon functional performance deficits and assessments, this evaluation has been identified as a high complexity evaluation.  At this time, OT recommendations at time of discharge are level 2 mod intensity resources.     Rehab Resource Intensity Level, OT: II (Moderate Resource Intensity)

## 2024-09-25 NOTE — PROGRESS NOTES
"Progress Note - Hospitalist   Name: La Garcia 74 y.o. female I MRN: [id number]  Unit/Bed#: -01 I Date of Admission: 9/23/2024   Date of Service: 9/25/2024 I Hospital Day: 2     Assessment & Plan  Fracture of neck of right femur (HCC)  Resulted after a mechanical fall on right thigh  X-rays showing right femoral intertrochanteric fracture  S/p right hip repair on 09/24  PT and OT to evaluate, may require short-term rehab  Pain control as needed  Fall  Presented with a mechanical fall on her right thigh  Denies loss of consciousness or head strike  With right hip fracture  See under fracture of right femur  Type 2 diabetes mellitus, without long-term current use of insulin ([facility])  Lab Results   Component Value Date    HGBA1C 6.8 (H) 09/23/2024       Recent Labs     09/24/24  1059 09/24/24  1548 09/24/24  2110 09/25/24  0712   POCGLU 85 92 197* 244*       Blood Sugar Average: Last 72 hrs:  (P) 140.5  Home regimen consist of glimepiride and Actos  Hold home oral antidiabetic agents  Start Lantus 5 units at bedtime, sliding scale and low-carb diet    Class 2 obesity due to excess calories in adult  Counseling on lifestyle modifications  Primary hypertension  /69 (BP Location: Right arm)   Pulse 97   Temp 97.7 °F (36.5 °C) (Oral)   Resp 18   Ht 5' 2\" (1.575 m)   Wt 96 kg (211 lb 10.3 oz)   SpO2 99%   BMI 38.71 kg/m²     Blood pressure elevated at the time of arrival, secondary to pain  Continue amlodipine 5 mg daily with BP hold parameters  Stage 4 chronic kidney disease (HCC)  Lab Results   Component Value Date    EGFR 19 09/25/2024    EGFR 25 09/24/2024    EGFR 27 09/23/2024    CREATININE 2.35 (H) 09/25/2024    CREATININE 1.87 (H) 09/24/2024    CREATININE 1.79 (H) 09/23/2024     Current creatinine within baseline  Slightly increased creatinine from baseline  Will give gentle IV fluids today    Mixed hyperlipidemia  Intolerant to statin  Outpatient PCP follow-up    VTE Pharmacologic Prophylaxis: "   Pharmacologic: Heparin  Mechanical VTE Prophylaxis in Place: Yes    Current Length of Stay: 2 day(s)    Current Patient Status: Inpatient   Certification Statement: The patient will continue to require additional inpatient hospital stay due to pending PT, OT evaluation.  IV fluids.    Discharge Plan: 24 to 48 hours    Code Status: Level 1 - Full Code      Subjective:   Patient reports doing well overall.  Does have some pain in her right hip but improved from day prior.  Has been able to tolerate diet.  Denies any chest pain or shortness of breath.    Objective:     Vitals:   Temp (24hrs), Av.4 °F (36.9 °C), Min:97.4 °F (36.3 °C), Max:99.9 °F (37.7 °C)    Temp:  [97.4 °F (36.3 °C)-99.9 °F (37.7 °C)] 97.7 °F (36.5 °C)  HR:  [79-98] 97  Resp:  [16-22] 18  BP: (100-145)/(57-78) 131/69  SpO2:  [92 %-99 %] 99 %  Body mass index is 38.71 kg/m².     Input and Output Summary (last 24 hours):       Intake/Output Summary (Last 24 hours) at 2024 1021  Last data filed at 2024 0401  Gross per 24 hour   Intake 900 ml   Output 800 ml   Net 100 ml       Physical Exam:     Physical Exam  Vitals and nursing note reviewed.   Constitutional:       Appearance: Normal appearance.   HENT:      Head: Normocephalic.   Eyes:      Conjunctiva/sclera: Conjunctivae normal.   Cardiovascular:      Rate and Rhythm: Normal rate.   Pulmonary:      Effort: Pulmonary effort is normal. No respiratory distress.   Abdominal:      Palpations: Abdomen is soft.      Tenderness: There is no abdominal tenderness.   Musculoskeletal:         General: No swelling. Normal range of motion.   Skin:     General: Skin is warm.   Neurological:      General: No focal deficit present.      Mental Status: She is alert. Mental status is at baseline.         Additional Data:     Labs:    Results from last 7 days   Lab Units 24  0433 24  0522 24  2255   WBC Thousand/uL 9.17   < > 11.09*   HEMOGLOBIN g/dL 9.4*   < > 10.8*   HEMATOCRIT %  30.9*   < > 34.1*   PLATELETS Thousands/uL 213   < > 237   SEGS PCT %  --   --  80*   LYMPHO PCT %  --   --  13*   MONO PCT %  --   --  6   EOS PCT %  --   --  1    < > = values in this interval not displayed.     Results from last 7 days   Lab Units 09/25/24  0433 09/24/24  0522   SODIUM mmol/L 136 138   POTASSIUM mmol/L 5.1 5.4*   CHLORIDE mmol/L 105 110*   CO2 mmol/L 19* 20*   BUN mg/dL 46* 43*   CREATININE mg/dL 2.35* 1.87*   ANION GAP mmol/L 12 8   CALCIUM mg/dL 9.9 9.5   ALBUMIN g/dL  --  3.6   TOTAL BILIRUBIN mg/dL  --  0.30   ALK PHOS U/L  --  74   ALT U/L  --  20   AST U/L  --  35   GLUCOSE RANDOM mg/dL 250* 141*     Results from last 7 days   Lab Units 09/23/24  2255   INR  1.15     Results from last 7 days   Lab Units 09/25/24  0712 09/24/24  2110 09/24/24  1548 09/24/24  1059 09/24/24  0830 09/24/24  0018   POC GLUCOSE mg/dl 244* 197* 92 85 91 134     Results from last 7 days   Lab Units 09/23/24  2255   HEMOGLOBIN A1C % 6.8*               * I Have Reviewed All Lab Data Listed Above.  * Additional Pertinent Lab Tests Reviewed: All Labs For Current Hospital Admission Reviewed    Mobility:  Basic Mobility Inpatient Raw Score: 10  [facility] Goal: 4: Move to chair/commode  [facility] Achieved: 2: Bed activities/Dependent transfer    Lines:     Invasive Devices       Peripheral Intravenous Line  Duration             Peripheral IV 09/23/24 Left Antecubital 1 day                       Imaging:    Imaging Reports Reviewed Today Include:     XR femur 2 vw right    Result Date: 9/25/2024  Impression: Fluoroscopy provided for procedure guidance. Please refer to the separate procedure note for additional details. Workstation performed: [id number]     XR chest 1 view portable    Result Date: 9/24/2024  Impression: No acute cardiopulmonary disease. Workstation performed: [id number]     XR femur 2 views RIGHT    Result Date: 9/24/2024  Impression: Acute displaced angulated right femoral intertrochanteric fracture. Distal femur  is intact. Clinical provider is aware of the findings. Computerized Assisted Algorithm (CAA) may have been used to analyze all applicable images. Workstation performed: [id number]     XR hip/pelv 2-3 vws right    Result Date: 9/24/2024  Impression: Acute displaced angulated right femoral intertrochanteric fracture. Clinical provider is aware of the findings. Computerized Assisted Algorithm (CAA) may have been used to analyze all applicable images. Workstation performed: [id number]        Recent Cultures (last 7 days):           Last 24 Hours Medication List:   Current Facility-Administered Medications   Medication Dose Route Frequency Provider Last Rate    acetaminophen  650 mg Oral Q6H PRN Denise Cho MD      amLODIPine  5 mg Oral Daily Denise Cho MD      cholecalciferol  1,000 Units Oral Daily Denise Cho MD      docusate sodium  100 mg Oral BID Denise Cho MD      heparin (porcine)  5,000 Units Subcutaneous Q8H MORIS Denise Cho MD      HYDROmorphone  0.2 mg Intravenous Q3H PRN Denise Cho MD      insulin glargine  5 Units Subcutaneous HS Sumanth Tello MD      insulin lispro  1-5 Units Subcutaneous HS Denise Cho MD      insulin lispro  1-5 Units Subcutaneous TID AC Denise Cho MD      lactated ringers  1,000 mL Intravenous Once PRN Colt Alma Delia, PA-C      And    lactated ringers  1,000 mL Intravenous Once PRN Colt Alma Delia, PA-C      multi-electrolyte  75 mL/hr Intravenous Continuous Sumanth Tello MD      ondansetron  4 mg Intravenous Q6H PRN Denise Cho MD      oxyCODONE  5 mg Oral Q4H PRN Denise Cho MD      sodium chloride  1,000 mL Intravenous Once PRN Colt Alma Delia, PA-C      And    sodium chloride  1,000 mL Intravenous Once PRN Colt Alma Delia, PA-C      Sodium Zirconium Cyclosilicate  10 g Oral Daily Denise Cho MD          Today, Patient Was Seen By: Sumanth Tello MD    ** Please Note: Dictation voice to text software may have been used in the creation of this document. **

## 2024-09-25 NOTE — PROGRESS NOTES
Progress Note - Orthopedics   Name: La Garcia 74 y.o. female I MRN: [id number]  Unit/Bed#: -01 I Date of Admission: 9/23/2024   Date of Service: 9/25/2024 I Hospital Day: 2    Assessment & Plan  Fracture of neck of right femur (HCC)  WBAT RLE  PT/OT  Pain control  Currently on herparin for DVT PPX, rec aspirin 325mg BID for 35 days on discharge    Fall      Orthopedics service will follow.    History of Present Illness   74 y.o.female POD 1 right short IM nail for IT fracture.  No acute events, no new complaints. Pain well controlled. Denies fevers, chills, CP, SOB, N/V, numbness or tingling. Patient reports no issues with urination or bowel movements. Patient states she has not worked with PT yet.     Objective      Temp:  [97.4 °F (36.3 °C)-99.9 °F (37.7 °C)] 97.7 °F (36.5 °C)  HR:  [79-98] 97  Resp:  [16-22] 18  BP: (100-145)/(57-78) 131/69  O2 Device: Nasal cannula          I/O         09/23 0701  09/24 0700 09/24 0701  09/25 0700 09/25 0701 09/26 0700    P.O. 0 200     I.V. (mL/kg)  700 (7.3)     Total Intake(mL/kg) 0 (0) 900 (9.4)     Urine (mL/kg/hr) 450 800 (0.3)     Emesis/NG output 0      Stool 0      Total Output 450 800     Net -450 +100            Unmeasured Urine Occurrence  2 x     Unmeasured Stool Occurrence 0 x      Unmeasured Emesis Occurrence 1 x            Lines/Drains/Airways       Active Status       None                  Physical Exam Musculoskeletal: rightlower  .Thigh supple, no hematoma or collection  Dressing c/d/i  Mild TTP about the thigh  Motor intact to +FHL/EHL, +ankle dorsi/plantar flexion  Sensation intact to saphenous, sural, tibial, superficial peroneal nerve, and deep peroneal  2+ DP pulse  No calf swelling or tenderness to palpation      Lab Results: I have reviewed the following results: CBC/BMP:   .     09/25/24  0433   WBC 9.17   HGB 9.4*   HCT 30.9*      SODIUM 136   K 5.1      CO2 19*   BUN 46*   CREATININE 2.35*   GLUC 250*      Recent Labs      "09/23/24  2255 09/24/24  0522 09/25/24  0433   WBC 11.09* 7.47 9.17   HGB 10.8* 9.7* 9.4*   HCT 34.1* 31.4* 30.9*    218 213   BUN 43* 43* 46*   CREATININE 1.79* 1.87* 2.35*   PTT 28  --   --    INR 1.15  --   --      Blood Culture:  No results found for: \"BLOODCX\"  Wound Culture: No results found for: \"WOUNDCULT\"  "

## 2024-09-25 NOTE — PLAN OF CARE
Problem: PAIN - ADULT  Goal: Verbalizes/displays adequate comfort level or baseline comfort level  Description: Interventions:  - Encourage patient to monitor pain and request assistance  - Assess pain using appropriate pain scale  - Administer analgesics based on type and severity of pain and evaluate response  - Implement non-pharmacological measures as appropriate and evaluate response  - Consider cultural and social influences on pain and pain management  - Notify physician/advanced practitioner if interventions unsuccessful or patient reports new pain  Outcome: Progressing     Problem: INFECTION - ADULT  Goal: Absence or prevention of progression during hospitalization  Description: INTERVENTIONS:  - Assess and monitor for signs and symptoms of infection  - Monitor lab/diagnostic results  - Monitor all insertion sites, i.e. indwelling lines, tubes, and drains  - Monitor endotracheal if appropriate and nasal secretions for changes in amount and color  - [place] appropriate cooling/warming therapies per order  - Administer medications as ordered  - Instruct and encourage patient and family to use good hand hygiene technique  - Identify and instruct in appropriate isolation precautions for identified infection/condition  Outcome: Progressing     Problem: SAFETY ADULT  Goal: Patient will remain free of falls  Description: INTERVENTIONS:  - Educate patient/family on patient safety including physical limitations  - Instruct patient to call for assistance with activity   - Consult OT/PT to assist with strengthening/mobility   - Keep Call bell within reach  - Keep bed low and locked with side rails adjusted as appropriate  - Keep care items and personal belongings within reach  - Initiate and maintain comfort rounds  - Make Fall Risk Sign visible to staff  - Offer Toileting every 2 Hours, in advance of need  - Initiate/Maintain bed alarm  - Obtain necessary fall risk management equipment: floor cushion  - Apply  HPI  Patient is here for follow up on diabetes. She reports that she has been under a lot of stress at work. She states that she has been more irritable. Her mood has been low for the last year.  Patient reports that she has had a lot of doubts about appropriateness of her decisions regarding her father's care at the end of life.  She finds it difficult to discuss the events around the time of his death.  She has not been able to talk to her family about those events. and she finds it very difficult to go to [facility].    Review of Systems     Constitutional:  Negative for fever, chills and fatigue.   HENT:  Negative for dry mouth and sinus congestion.    Eyes:  Negative for decreased vision.   Respiratory:   Negative for cough and dyspnea on exertion.    Cardiovascular:  Negative for chest pain, dyspnea on exertion and edema.   Neurological:  Negative for dizziness and headaches.   Psychiatric/Behavioral:  Positive for depression and mood swings.      Current Outpatient Prescriptions   Medication     aspirin 81 MG tablet     blood glucose monitoring (ACCU-CHEK COMPACT CARE KIT) meter device kit     blood glucose monitoring (ACCU-CHEK SMARTVIEW) test strip     blood glucose monitoring (DIANELYS CONTOUR NEXT) test strip     Blood Glucose Monitoring Suppl ([employer] CONTOUR NEXT MONITOR) W/DEVICE KIT     Calcium-Vitamin D (CALCIUM + D PO)     Cetirizine HCl (ZYRTEC PO)     estradiol (VAGIFEM) 10 MCG TABS vaginal tablet     losartan (COZAAR) 100 MG tablet     metFORMIN (GLUCOPHAGE-XR) 500 MG 24 hr tablet     simvastatin (ZOCOR) 20 MG tablet     VITAMIN D, CHOLECALCIFEROL, PO     No current facility-administered medications for this visit.      Vitals:    04/25/18 0959 04/25/18 1001 04/25/18 1002   BP: 138/88 137/87 133/89   Pulse: 81 75 75   Weight: 119.7 kg (264 lb)           Physical Exam   Constitutional: She is well-developed, well-nourished, and in no distress.   HENT:   Head: Normocephalic and atraumatic.   Cardiovascular:  Normal rate and regular rhythm.    Pulmonary/Chest: Effort normal.   Musculoskeletal: She exhibits no edema.   Skin: Skin is dry.   Psychiatric: Mood, memory, affect and judgment normal.   Vitals reviewed.      Assessment and Plan:  Sola was seen today for recheck.    Diagnoses and all orders for this visit:    Type 2 diabetes mellitus without complication, without long-term current use of insulin (H).  Patient was encouraged to start regular exercise program.  Discussed glycemic control, recommend checking hemoglobin A1c.  We will also check lipid panel as well as hepatic panel.  Patient will continue with current medical therapy unless glycemic control requires additional interventions.  -     Hemoglobin A1c  -     Lipid Profile  -     Hepatic Panel    Atrophic vaginitis.  patient was given prescription for Vagifem to use for atrophic vaginitis management.  -     estradiol (VAGIFEM) 10 MCG TABS vaginal tablet; Place 1 tablet (10 mcg) vaginally twice a week    Complicated bereavement.  Discussed possible intervention interventions of complicated bereavement.  Patient was advised to consider starting therapy, referral to counseling was provided.  Patient was encouraged to contact her insurance for the list of covered mental health providers.  Will follow up in 1-2 months to review progress.  Will consider medical therapy if patient continues to struggle with ongoing thoughts and depression.  -     MENTAL HEALTH REFERRAL  - Adult; Outpatient Treatment; Individual/Couples/Family/Group Therapy/Health Psychology; [facility] [place]: [facility] [phone]; We will contact you to schedule the appointment or please call with any questions    Essential hypertension, benign.  Blood pressure is currently within acceptable range, no additional change in therapy is required.  Will continue with close monitoring of the blood pressure.  Patient was encouraged to restart regular physical activity.    Total time spent 25  yellow socks and bracelet for high fall risk patients  - Consider moving patient to room near nurses station  Outcome: Progressing     Problem: DISCHARGE PLANNING  Goal: Discharge to home or other facility with appropriate resources  Description: INTERVENTIONS:  - Identify barriers to discharge w/patient and caregiver  - Arrange for needed discharge resources and transportation as appropriate  - Identify discharge learning needs (meds, wound care, etc.)  - Arrange for interpretive services to assist at discharge as needed  - Refer to Case Management Department for coordinating discharge planning if the patient needs post-hospital services based on physician/advanced practitioner order or complex needs related to functional status, cognitive ability, or social support system  Outcome: Progressing     Problem: Knowledge Deficit  Goal: Patient/family/caregiver demonstrates understanding of disease process, treatment plan, medications, and discharge instructions  Description: Complete learning assessment and assess knowledge base.  Interventions:  - Provide teaching at level of understanding  - Provide teaching via preferred learning methods  Outcome: Progressing     Problem: Prexisting or High Potential for Compromised Skin Integrity  Goal: Skin integrity is maintained or improved  Description: INTERVENTIONS:  - Identify patients at risk for skin breakdown  - Assess and monitor skin integrity  - Assess and monitor nutrition and hydration status  - Monitor labs   - Assess for incontinence   - Turn and reposition patient  - Assist with mobility/ambulation  - Relieve pressure over bony prominences  - Avoid friction and shearing  - Provide appropriate hygiene as needed including keeping skin clean and dry  - Evaluate need for skin moisturizer/barrier cream  - Collaborate with interdisciplinary team   - Patient/family teaching  - Consider wound care consult   Outcome: Progressing      minutes.  More than 50% of the time spent with Ms. Silverman on counseling / coordinating her care    Alda Santamaria MD

## 2024-09-25 NOTE — ASSESSMENT & PLAN NOTE
Resulted after a mechanical fall on right thigh  X-rays showing right femoral intertrochanteric fracture  S/p right hip repair on 09/24  PT and OT to evaluate, may require short-term rehab  Pain control as needed

## 2024-09-26 ENCOUNTER — APPOINTMENT (INPATIENT)
Dept: ULTRASOUND IMAGING | Facility: HOSPITAL | Age: 74
DRG: 481 | End: 2024-09-26
Payer: COMMERCIAL

## 2024-09-26 PROBLEM — E83.9 CHRONIC KIDNEY DISEASE-MINERAL AND BONE DISORDER: Status: ACTIVE | Noted: 2024-09-26

## 2024-09-26 PROBLEM — N17.9 AKI (ACUTE KIDNEY INJURY) (HCC): Status: ACTIVE | Noted: 2024-09-26

## 2024-09-26 PROBLEM — E87.20 NORMAL ANION GAP METABOLIC ACIDOSIS: Status: ACTIVE | Noted: 2024-09-26

## 2024-09-26 PROBLEM — D62 ABLA (ACUTE BLOOD LOSS ANEMIA): Status: ACTIVE | Noted: 2024-09-26

## 2024-09-26 PROBLEM — N18.9 CHRONIC KIDNEY DISEASE-MINERAL AND BONE DISORDER: Status: ACTIVE | Noted: 2024-09-26

## 2024-09-26 PROBLEM — M89.9 CHRONIC KIDNEY DISEASE-MINERAL AND BONE DISORDER: Status: ACTIVE | Noted: 2024-09-26

## 2024-09-26 LAB
ANION GAP SERPL CALCULATED.3IONS-SCNC: 12 MMOL/L (ref 4–13)
BACTERIA UR QL AUTO: ABNORMAL /HPF
BILIRUB UR QL STRIP: NEGATIVE
BUN SERPL-MCNC: 68 MG/DL (ref 5–25)
CALCIUM SERPL-MCNC: 9.1 MG/DL (ref 8.4–10.2)
CHLORIDE SERPL-SCNC: 102 MMOL/L (ref 96–108)
CLARITY UR: CLEAR
CO2 SERPL-SCNC: 19 MMOL/L (ref 21–32)
COLOR UR: YELLOW
CREAT SERPL-MCNC: 2.87 MG/DL (ref 0.6–1.3)
CREAT UR-MCNC: 117 MG/DL
ERYTHROCYTE [DISTWIDTH] IN BLOOD BY AUTOMATED COUNT: 14 % (ref 11.6–15.1)
GFR SERPL CREATININE-BSD FRML MDRD: 15 ML/MIN/1.73SQ M
GLUCOSE SERPL-MCNC: 145 MG/DL (ref 65–140)
GLUCOSE SERPL-MCNC: 175 MG/DL (ref 65–140)
GLUCOSE SERPL-MCNC: 204 MG/DL (ref 65–140)
GLUCOSE SERPL-MCNC: 221 MG/DL (ref 65–140)
GLUCOSE UR STRIP-MCNC: NEGATIVE MG/DL
HCT VFR BLD AUTO: 24.3 % (ref 34.8–46.1)
HGB BLD-MCNC: 7.6 G/DL (ref 11.5–15.4)
HGB UR QL STRIP.AUTO: NEGATIVE
KETONES UR STRIP-MCNC: NEGATIVE MG/DL
LEUKOCYTE ESTERASE UR QL STRIP: ABNORMAL
MCH RBC QN AUTO: 29.8 PG (ref 26.8–34.3)
MCHC RBC AUTO-ENTMCNC: 31.3 G/DL (ref 31.4–37.4)
MCV RBC AUTO: 95 FL (ref 82–98)
NITRITE UR QL STRIP: NEGATIVE
NON-SQ EPI CELLS URNS QL MICRO: ABNORMAL /HPF
OTHER STN SPEC: ABNORMAL
PH UR STRIP.AUTO: 6 [PH]
PLATELET # BLD AUTO: 181 THOUSANDS/UL (ref 149–390)
PMV BLD AUTO: 9.7 FL (ref 8.9–12.7)
POTASSIUM SERPL-SCNC: 4.9 MMOL/L (ref 3.5–5.3)
PROT UR STRIP-MCNC: ABNORMAL MG/DL
RBC # BLD AUTO: 2.55 MILLION/UL (ref 3.81–5.12)
RBC #/AREA URNS AUTO: ABNORMAL /HPF
SODIUM 24H UR-SCNC: 35 MOL/L
SODIUM SERPL-SCNC: 133 MMOL/L (ref 135–147)
SP GR UR STRIP.AUTO: 1.02 (ref 1–1.03)
UROBILINOGEN UR QL STRIP.AUTO: 0.2 E.U./DL
WBC # BLD AUTO: 9 THOUSAND/UL (ref 4.31–10.16)
WBC #/AREA URNS AUTO: ABNORMAL /HPF

## 2024-09-26 PROCEDURE — 82948 REAGENT STRIP/BLOOD GLUCOSE: CPT

## 2024-09-26 PROCEDURE — 80048 BASIC METABOLIC PNL TOTAL CA: CPT | Performed by: STUDENT IN AN ORGANIZED HEALTH CARE EDUCATION/TRAINING PROGRAM

## 2024-09-26 PROCEDURE — 97110 THERAPEUTIC EXERCISES: CPT

## 2024-09-26 PROCEDURE — 84300 ASSAY OF URINE SODIUM: CPT | Performed by: STUDENT IN AN ORGANIZED HEALTH CARE EDUCATION/TRAINING PROGRAM

## 2024-09-26 PROCEDURE — 99232 SBSQ HOSP IP/OBS MODERATE 35: CPT | Performed by: STUDENT IN AN ORGANIZED HEALTH CARE EDUCATION/TRAINING PROGRAM

## 2024-09-26 PROCEDURE — 82570 ASSAY OF URINE CREATININE: CPT | Performed by: STUDENT IN AN ORGANIZED HEALTH CARE EDUCATION/TRAINING PROGRAM

## 2024-09-26 PROCEDURE — 81001 URINALYSIS AUTO W/SCOPE: CPT | Performed by: STUDENT IN AN ORGANIZED HEALTH CARE EDUCATION/TRAINING PROGRAM

## 2024-09-26 PROCEDURE — 99024 POSTOP FOLLOW-UP VISIT: CPT | Performed by: PHYSICIAN ASSISTANT

## 2024-09-26 PROCEDURE — 97116 GAIT TRAINING THERAPY: CPT

## 2024-09-26 PROCEDURE — 76775 US EXAM ABDO BACK WALL LIM: CPT

## 2024-09-26 PROCEDURE — 85027 COMPLETE CBC AUTOMATED: CPT | Performed by: STUDENT IN AN ORGANIZED HEALTH CARE EDUCATION/TRAINING PROGRAM

## 2024-09-26 RX ORDER — AMLODIPINE BESYLATE 5 MG/1
5 TABLET ORAL DAILY
Status: DISCONTINUED | OUTPATIENT
Start: 2024-09-27 | End: 2024-09-27 | Stop reason: HOSPADM

## 2024-09-26 RX ADMIN — HEPARIN SODIUM 5000 UNITS: 5000 INJECTION, SOLUTION INTRAVENOUS; SUBCUTANEOUS at 08:26

## 2024-09-26 RX ADMIN — INSULIN LISPRO 1 UNITS: 100 INJECTION, SOLUTION INTRAVENOUS; SUBCUTANEOUS at 17:40

## 2024-09-26 RX ADMIN — OXYCODONE HYDROCHLORIDE 5 MG: 5 TABLET ORAL at 17:39

## 2024-09-26 RX ADMIN — HEPARIN SODIUM 5000 UNITS: 5000 INJECTION, SOLUTION INTRAVENOUS; SUBCUTANEOUS at 23:39

## 2024-09-26 RX ADMIN — Medication 1000 UNITS: at 08:26

## 2024-09-26 RX ADMIN — OXYCODONE HYDROCHLORIDE 5 MG: 5 TABLET ORAL at 10:55

## 2024-09-26 RX ADMIN — SENNOSIDES AND DOCUSATE SODIUM 1 TABLET: 50; 8.6 TABLET ORAL at 17:39

## 2024-09-26 RX ADMIN — HEPARIN SODIUM 5000 UNITS: 5000 INJECTION, SOLUTION INTRAVENOUS; SUBCUTANEOUS at 17:39

## 2024-09-26 RX ADMIN — INSULIN GLARGINE 10 UNITS: 100 INJECTION, SOLUTION SUBCUTANEOUS at 08:26

## 2024-09-26 RX ADMIN — INSULIN LISPRO 1 UNITS: 100 INJECTION, SOLUTION INTRAVENOUS; SUBCUTANEOUS at 21:36

## 2024-09-26 RX ADMIN — SENNOSIDES AND DOCUSATE SODIUM 1 TABLET: 50; 8.6 TABLET ORAL at 08:26

## 2024-09-26 RX ADMIN — POLYETHYLENE GLYCOL 3350 17 G: 17 POWDER, FOR SOLUTION ORAL at 08:26

## 2024-09-26 RX ADMIN — OXYCODONE HYDROCHLORIDE 5 MG: 5 TABLET ORAL at 04:57

## 2024-09-26 RX ADMIN — INSULIN LISPRO 2 UNITS: 100 INJECTION, SOLUTION INTRAVENOUS; SUBCUTANEOUS at 10:55

## 2024-09-26 NOTE — ASSESSMENT & PLAN NOTE
Resulted after a mechanical fall on right thigh  X-rays showing right femoral intertrochanteric fracture  S/p right hip repair on 09/24  PT and OT evaluated recommending rehab  Ortho cleared for discharge, recommend aspirin 325 mg twice a day on discharge.  Will need follow-up in 2 weeks in office with Ortho  Pending medical clearance, currently accepted [facility]

## 2024-09-26 NOTE — ASSESSMENT & PLAN NOTE
-ABLA on chronic anemia of CKD  -Hgb ***.  Goal >10  -continue to trend  -Transfuse for Hgb <7.0  -management per primary team

## 2024-09-26 NOTE — PHYSICAL THERAPY NOTE
PHYSICAL THERAPY Treatment  DATE: 09/26/24  TIME: 3689-2669    NAME:  La Garcia  AGE:   74 y.o.  Mrn:   [id number]  Length Of Stay: 3    ADMIT DX:  Hip fracture (HCC) [S72.009A]  Weakness [R53.1]  Fall, initial encounter [W19.XXXA]    Past Medical History:   Diagnosis Date    Arthritis     Diabetes mellitus (HCC)     Hypertension     Renal disorder      Past Surgical History:   Procedure Laterality Date    APPENDECTOMY      CHOLECYSTECTOMY      HERNIA REPAIR      [place] OPTX FEM SHFT FX W/INSJ IMED IMPLT W/WO SCREW Right 9/24/2024    Procedure: INSERTION NAIL IM FEMUR ANTEGRADE (TROCHANTERIC)- right;  Surgeon: John Peters DO;  Location:  MAIN OR;  Service: Orthopedics        09/26/24 1241   PT Last Visit   PT Visit Date 09/26/24   Note Type   Note Type Treatment   Pain Assessment   Pain Assessment Tool 0-10   Pain Score 7   Pain Location/Orientation Orientation: Right;Location: Hip   Hospital Pain Intervention(s) Repositioned;Ambulation/increased activity;Rest   Restrictions/Precautions   Weight Bearing Precautions Per Order Yes   RLE Weight Bearing Per Order WBAT   Other Precautions Fall Risk;Pain;WBS;Chair Alarm   General   Chart Reviewed Yes   Family/Caregiver Present Yes   Cognition   Overall Cognitive Status WFL   Arousal/Participation Alert   Attention Difficulty attending to directions   Orientation Level Oriented X4   Memory Decreased recall of precautions;Decreased recall of recent events   Following Commands Follows multistep commands with increased time or repetition   Subjective   Subjective Pt continues to c/o pain and generalized fatigue with activity, but no reported dizziness this session   Transfers   Sit to Stand 2  Maximal assistance   Additional items Assist x 1;Armrests;Increased time required;Verbal cues;Impulsive   Stand to Sit 3  Moderate assistance   Additional items Assist x 1;Armrests;Increased time required;Impulsive;Verbal cues   Ambulation/Elevation   Gait Assistance 3  Moderate  assist   Additional items Assist x 1;Verbal cues;Tactile cues   Assistive Device Rolling walker   Distance 10'   Ambulation/Elevation Additional Comments Pt utilized walker and required several standing rest breaks due to fatigue and pain.  Pt able to clear RLE to advance step forward, but only able to manage short step.  Pt exhibits poor utilization of walker when advancing LLE, requiring 2 attempts to advance LLE up to RLE.  significant assistance needed to manage balance and unweight pt due to pain and poor tolerance with RLE wbing.   Balance   Static Sitting Fair   Static Standing Poor +   Dynamic Standing Poor   Ambulatory Poor  (RW)   Endurance Deficit   Endurance Deficit Yes   Activity Tolerance   Activity Tolerance Patient limited by fatigue;Patient limited by pain   Exercises   Balance training  standing with RW: lateral weight shifting 10x, marching in place 10x on right, 5x on left. tori heel raises 10x.  Pt tolerated 3-4 minutes of static standing prior to ambulation, while working on postural correction, walker management, and COM/balanace management.   Assessment   Prognosis Fair   Problem List Decreased strength;Decreased range of motion;Decreased endurance;Impaired balance;Decreased mobility;Impaired judgement;Decreased safety awareness;Obesity;Orthopedic restrictions;Pain   Assessment Pt exhibits physical deficits noted in problem list above.  Deficits listed contribute to functional limitations that are significant from the patient PLOF and include: difficulty with bed mobility, impaired sitting balance, impaired standing balance, inability to perform safe transfers, tolerance for OOB functional activities, unsafe/inefficient ambulation, and fall risk    Additionally, patient is limited by restrictions/precautions/DME: RLE WBAT.    Additional considerations affecting pt's discharge planning: Insufficient or unavailable assistance at home    During today's session, reviewed sit<>stand transfer,  proper walker management and utilization, and postural corrections. Initiated standing exercises with walker, attempting to promote tolerance in standing, balance, and walker utilization.  Some carry over, and pt did seem to tolerate better with time. Initiated gait training, which progressed slowly.  Pt was able to improve both step advancements with practice, time, and instruction.  Pt was significantly fatigued following session, but pain appeared to be managed.  Pt continues to be appropriate for SNF placement.     The -PAC Mobility Score was used to assist in determining pt safety w/ mobility/self care & appropriate d/c recommendations, see above for scores. Patient's clinical presentation is evolving due to significant acute change in functional independence, uncontrolled pain, significant need for care assistance compared to baseline, recent surgery/procedure, ongoing medical management needs, and complicated social/support system.     From a PT/mobility standpoint given the above findings, DC recommendation is level: II (Moderate Rehab Resource Intensity)    Considering the patient's PLOF, co-morbidities, acute functional limitations, functional outcome measures, and/or goal to progress functional independence; this patient would continue to benefit from skilled Physical Therapy intervention in the acute care setting.   Barriers to Discharge Decreased caregiver support;Inaccessible home environment   Goals   Patient Goals return home   STG Expiration Date 10/05/24   Short Term Goal #1 1: Pt will perform supine<>sit transfer on flat bed, mod I. 2: Pt will perform stand pivot transfer with RW, mod I. 3: Pt will ambulate 150' with reciprocal gait and appropriate pace using RW, mod I. 4: Pt will perform written HEP, mod I.   Plan   Treatment/Interventions Functional transfer training;LE strengthening/ROM;Therapeutic exercise;Endurance training;Patient/family training;Equipment eval/education;Bed mobility;Gait  training   PT Frequency 3-5x/wk   Discharge Recommendation   Rehab Resource Intensity Level, PT II (Moderate Resource Intensity)   AM-PAC Basic Mobility Inpatient   Turning in Flat Bed Without Bedrails 2   Lying on Back to Sitting on Edge of Flat Bed Without Bedrails 2   Moving Bed to Chair 2   Standing Up From Chair Using Arms 2   Walk in Room 2   Climb 3-5 Stairs With Railing 1   Basic Mobility Inpatient Raw Score 11   Basic Mobility Standardized Score 30.25   [facility] Highest Level Of Mobility   -[facility] Goal 4: Move to chair/commode   -HL Achieved 6: Walk 10 steps or more   Education   Education Provided Mobility training;Home exercise program;Assistive device   Patient Demonstrates acceptance/verbal understanding;Demonstrates verbal understanding;Reinforcement needed   End of Consult   Patient Position at End of Consult Bedside chair;All needs within reach;Bed/Chair alarm activated     The patient's AM-PAC Basic Mobility Inpatient Short Form Raw Score is 11. A Raw score of less than 16 suggests the patient may benefit from discharge to post-acute rehabilitation services. Please also refer to the recommendation of the Physical Therapist for safe discharge planning.    Dustin Amaya, PT, DPT  [place] Licensure #[id number]

## 2024-09-26 NOTE — ASSESSMENT & PLAN NOTE
Lab Results   Component Value Date    EGFR 15 09/26/2024    EGFR 19 09/25/2024    EGFR 25 09/24/2024    CREATININE 2.87 (H) 09/26/2024    CREATININE 2.35 (H) 09/25/2024    CREATININE 1.87 (H) 09/24/2024   -Etiology:  Suspect due to diabetic nephropathy, hypertensive nephrosclerosis and age-related nephron loss  -With hx proteinuria, UACr 1.2g Nov '23  -Baseline creatinine 1.7-2.0 since 2019  -Outpatient Nephrologist: seen by Dr Negro, [facility], in Dec. '22 and lost to f/u  -Will need follow-up on discharge

## 2024-09-26 NOTE — ASSESSMENT & PLAN NOTE
Presented with a mechanical fall on her right thigh  Denies loss of consciousness or head strike  With right hip fracture following surgery  See under fracture of right femur

## 2024-09-26 NOTE — ASSESSMENT & PLAN NOTE
-due to advanced CKD  -evidence of low serum bicarbonate levels since at least 2019  -serum bicarb ***, AG ***  -recommend sodium bicarbonate tablets 650 mg BID ***  -continue to monitor

## 2024-09-26 NOTE — ASSESSMENT & PLAN NOTE
-BMI 38.7  -continue to encourage weight management, lifestyle modifications and diet modifications to slow progession of CKD and proteinuria  -continue follow-up and outpatient management with PCP

## 2024-09-26 NOTE — ASSESSMENT & PLAN NOTE
-s/p mechanical fall  -s/p ORIF R femur with IM nail fixation 9/24/2024  -Postoperative management per orthopedic surgery

## 2024-09-26 NOTE — CASE MANAGEMENT
[facility] has received APPROVED authorization.  Insurance:   [employer]   Authorization received for: SNF  Facility: [facility]   Authorization #:[id number]   Start of Care: 09/26  Next Review Date: 09/30  Submit next review to: [phone]     Care Manager notified: Chanelle Palomino     Please reach out to CM for updates on any clinical information.

## 2024-09-26 NOTE — CASE MANAGEMENT
Case Management Discharge Planning Note    Patient name La Garcia  Location /-01 MRN [id number]  : 1950 Date 2024       Current Admission Date: 2024  Current Admission Diagnosis:Fracture of neck of right femur (HCC)   Patient Active Problem List    Diagnosis Date Noted Date Diagnosed    AMANDA (acute kidney injury) (HCC) 2024     ABLA (acute blood loss anemia) 2024     Fall 2024     Fracture of neck of right femur (HCC) 2024     Type 2 diabetes mellitus, without long-term current use of insulin (HCC) 2024     Class 2 obesity due to excess calories in adult 2024     Primary hypertension 2024     Stage 4 chronic kidney disease (HCC) 2024     Mixed hyperlipidemia 2024     Statin myopathy 2024     Stress incontinence of urine 2023     Hyperkalemia 2022     Acquired hallux valgus of both feet 2020     Pes planus 2020     Primary localized osteoarthritis of left ankle 2020     Venous insufficiency 2020     Hepatic steatosis 2015     Cortical senile cataract of both eyes 2015     Generalized anxiety disorder 2015     Osteoarthritis of both knees 2015     Proteinuria due to type 2 diabetes mellitus  (HCC) 2015       LOS (days): 3  Geometric Mean LOS (GMLOS) (days): 2.8  Days to GMLOS:0.2     OBJECTIVE:  Risk of Unplanned Readmission Score: 15.14         Current admission status: Inpatient   Preferred Pharmacy:   [facility] [address] [place] [address] [place] - [address] [address]  Phone: [phone] Fax: [phone]    Primary Care Provider: Deion Grimes MD    Primary Insurance: [employer]  Secondary Insurance:     DISCHARGE DETAILS:                                                                                                               Facility Insurance Auth Number: [id number]

## 2024-09-26 NOTE — ASSESSMENT & PLAN NOTE
Lab Results   Component Value Date    HGBA1C 6.8 (H) 09/23/2024       Recent Labs     09/25/24  1636 09/25/24  2209 09/26/24  0720 09/26/24  1050   POCGLU 356* 253* 145* 221*       Blood Sugar Average: Last 72 hrs:  (P) 194.0569838083704525  -stable  -continue to optimize glycemic control  -management per primary team

## 2024-09-26 NOTE — ASSESSMENT & PLAN NOTE
Lab Results   Component Value Date    HGBA1C 6.8 (H) 09/23/2024       Recent Labs     09/25/24  1636 09/25/24  2209 09/26/24  0720 09/26/24  1050   POCGLU 356* 253* 145* 221*       Blood Sugar Average: Last 72 hrs:  (P) 194.3329532693101118  Home regimen consist of glimepiride and Actos  Hold home oral antidiabetic agents  Continue Lantus 10 units once daily while inpatient

## 2024-09-26 NOTE — PLAN OF CARE
Problem: PHYSICAL THERAPY ADULT  Goal: Performs mobility at highest level of function for planned discharge setting.  See evaluation for individualized goals.  Description: Treatment/Interventions: Functional transfer training, LE strengthening/ROM, Therapeutic exercise, Endurance training, Patient/family training, Equipment eval/education, Bed mobility, Gait training          See flowsheet documentation for full assessment, interventions and recommendations.  Outcome: Progressing  Note: Prognosis: Fair  Problem List: Decreased strength, Decreased range of motion, Decreased endurance, Impaired balance, Decreased mobility, Impaired judgement, Decreased safety awareness, Obesity, Orthopedic restrictions, Pain  Assessment: Pt exhibits physical deficits noted in problem list above.  Deficits listed contribute to functional limitations that are significant from the patient PLOF and include: difficulty with bed mobility, impaired sitting balance, impaired standing balance, inability to perform safe transfers, tolerance for OOB functional activities, unsafe/inefficient ambulation, and fall risk    Additionally, patient is limited by restrictions/precautions/DME: RLE WBAT.    Additional considerations affecting pt's discharge planning: Insufficient or unavailable assistance at home    During today's session, reviewed sit<>stand transfer, proper walker management and utilization, and postural corrections. Initiated standing exercises with walker, attempting to promote tolerance in standing, balance, and walker utilization.  Some carry over, and pt did seem to tolerate better with time. Initiated gait training, which progressed slowly.  Pt was able to improve both step advancements with practice, time, and instruction.  Pt was significantly fatigued following session, but pain appeared to be managed.  Pt continues to be appropriate for SNF placement.     The AM-PAC Mobility Score was used to assist in determining pt safety  w/ mobility/self care & appropriate d/c recommendations, see above for scores. Patient's clinical presentation is evolving due to significant acute change in functional independence, uncontrolled pain, significant need for care assistance compared to baseline, recent surgery/procedure, ongoing medical management needs, and complicated social/support system.     From a PT/mobility standpoint given the above findings, DC recommendation is level: II (Moderate Rehab Resource Intensity)    Considering the patient's PLOF, co-morbidities, acute functional limitations, functional outcome measures, and/or goal to progress functional independence; this patient would continue to benefit from skilled Physical Therapy intervention in the acute care setting.  Barriers to Discharge: Decreased caregiver support, Inaccessible home environment     Rehab Resource Intensity Level, PT: II (Moderate Resource Intensity)    See flowsheet documentation for full assessment.

## 2024-09-26 NOTE — ASSESSMENT & PLAN NOTE
Lab Results   Component Value Date    EGFR 15 09/26/2024    EGFR 19 09/25/2024    EGFR 25 09/24/2024    CREATININE 2.87 (H) 09/26/2024    CREATININE 2.35 (H) 09/25/2024    CREATININE 1.87 (H) 09/24/2024   -Calcium stable  -Magnesium stable  -continue to monitor and follow phosphorus, PTH, calcium, magnesium as outpatient

## 2024-09-26 NOTE — ASSESSMENT & PLAN NOTE
Suspect likely postoperatively with right hip repair  Hemoglobin 7.6 today, on admission noted to be 10.8  No evidence of bleeding, monitor at this time

## 2024-09-26 NOTE — PLAN OF CARE
Problem: PAIN - ADULT  Goal: Verbalizes/displays adequate comfort level or baseline comfort level  Description: Interventions:  - Encourage patient to monitor pain and request assistance  - Assess pain using appropriate pain scale  - Administer analgesics based on type and severity of pain and evaluate response  - Implement non-pharmacological measures as appropriate and evaluate response  - Consider cultural and social influences on pain and pain management  - Notify physician/advanced practitioner if interventions unsuccessful or patient reports new pain  Outcome: Progressing     Problem: INFECTION - ADULT  Goal: Absence or prevention of progression during hospitalization  Description: INTERVENTIONS:  - Assess and monitor for signs and symptoms of infection  - Monitor lab/diagnostic results  - Monitor all insertion sites, i.e. indwelling lines, tubes, and drains  - Monitor endotracheal if appropriate and nasal secretions for changes in amount and color  - [place] appropriate cooling/warming therapies per order  - Administer medications as ordered  - Instruct and encourage patient and family to use good hand hygiene technique  - Identify and instruct in appropriate isolation precautions for identified infection/condition  Outcome: Progressing     Problem: SAFETY ADULT  Goal: Patient will remain free of falls  Description: INTERVENTIONS:  - Educate patient/family on patient safety including physical limitations  - Instruct patient to call for assistance with activity   - Consult OT/PT to assist with strengthening/mobility   - Keep Call bell within reach  - Keep bed low and locked with side rails adjusted as appropriate  - Keep care items and personal belongings within reach  - Initiate and maintain comfort rounds  - Make Fall Risk Sign visible to staff  - Offer Toileting every 2 Hours, in advance of need  - Initiate/Maintain alarm  - Obtain necessary fall risk management equipment:   - Apply yellow socks and  bracelet for high fall risk patients  - Consider moving patient to room near nurses station  Outcome: Progressing  Goal: Maintain or return to baseline ADL function  Description: INTERVENTIONS:  -  Assess patient's ability to carry out ADLs; assess patient's baseline for ADL function and identify physical deficits which impact ability to perform ADLs (bathing, care of mouth/teeth, toileting, grooming, dressing, etc.)  - Assess/evaluate cause of self-care deficits   - Assess range of motion  - Assess patient's mobility; develop plan if impaired  - Assess patient's need for assistive devices and provide as appropriate  - Encourage maximum independence but intervene and supervise when necessary  - Involve family in performance of ADLs  - Assess for home care needs following discharge   - Consider OT consult to assist with ADL evaluation and planning for discharge  - Provide patient education as appropriate  Outcome: Progressing  Goal: Maintains/Returns to pre admission functional level  Description: INTERVENTIONS:  - Perform AM-PAC 6 Click Basic Mobility/ Daily Activity assessment daily.  - Set and communicate daily mobility goal to care team and patient/family/caregiver.   - Collaborate with rehabilitation services on mobility goals if consulted  - Perform Range of Motion 4 times a day.  - Reposition patient every 2 hours.  - Dangle patient 3 times a day  - Stand patient 3 times a day  - Ambulate patient 3 times a day  - Out of bed to chair 3 times a day   - Out of bed for meals 3 times a day  - Out of bed for toileting  - Record patient progress and toleration of activity level   Outcome: Progressing     Problem: DISCHARGE PLANNING  Goal: Discharge to home or other facility with appropriate resources  Description: INTERVENTIONS:  - Identify barriers to discharge w/patient and caregiver  - Arrange for needed discharge resources and transportation as appropriate  - Identify discharge learning needs (meds, wound care,  etc.)  - Arrange for interpretive services to assist at discharge as needed  - Refer to Case Management Department for coordinating discharge planning if the patient needs post-hospital services based on physician/advanced practitioner order or complex needs related to functional status, cognitive ability, or social support system  Outcome: Progressing     Problem: Knowledge Deficit  Goal: Patient/family/caregiver demonstrates understanding of disease process, treatment plan, medications, and discharge instructions  Description: Complete learning assessment and assess knowledge base.  Interventions:  - Provide teaching at level of understanding  - Provide teaching via preferred learning methods  Outcome: Progressing     Problem: Prexisting or High Potential for Compromised Skin Integrity  Goal: Skin integrity is maintained or improved  Description: INTERVENTIONS:  - Identify patients at risk for skin breakdown  - Assess and monitor skin integrity  - Assess and monitor nutrition and hydration status  - Monitor labs   - Assess for incontinence   - Turn and reposition patient  - Assist with mobility/ambulation  - Relieve pressure over bony prominences  - Avoid friction and shearing  - Provide appropriate hygiene as needed including keeping skin clean and dry  - Evaluate need for skin moisturizer/barrier cream  - Collaborate with interdisciplinary team   - Patient/family teaching  - Consider wound care consult   Outcome: Progressing

## 2024-09-26 NOTE — PLAN OF CARE
Problem: PAIN - ADULT  Goal: Verbalizes/displays adequate comfort level or baseline comfort level  Description: Interventions:  - Encourage patient to monitor pain and request assistance  - Assess pain using appropriate pain scale  - Administer analgesics based on type and severity of pain and evaluate response  - Implement non-pharmacological measures as appropriate and evaluate response  - Consider cultural and social influences on pain and pain management  - Notify physician/advanced practitioner if interventions unsuccessful or patient reports new pain  9/25/2024 2021 by Sonya Gong RN  Outcome: Progressing  9/25/2024 2020 by Sonya Gong RN  Outcome: Progressing     Problem: INFECTION - ADULT  Goal: Absence or prevention of progression during hospitalization  Description: INTERVENTIONS:  - Assess and monitor for signs and symptoms of infection  - Monitor lab/diagnostic results  - Monitor all insertion sites, i.e. indwelling lines, tubes, and drains  - Monitor endotracheal if appropriate and nasal secretions for changes in amount and color  - [place] appropriate cooling/warming therapies per order  - Administer medications as ordered  - Instruct and encourage patient and family to use good hand hygiene technique  - Identify and instruct in appropriate isolation precautions for identified infection/condition  9/25/2024 2021 by Sonya Gong RN  Outcome: Progressing  9/25/2024 2020 by Sonya Gong RN  Outcome: Progressing     Problem: SAFETY ADULT  Goal: Patient will remain free of falls  Description: INTERVENTIONS:  - Educate patient/family on patient safety including physical limitations  - Instruct patient to call for assistance with activity   - Consult OT/PT to assist with strengthening/mobility   - Keep Call bell within reach  - Keep bed low and locked with side rails adjusted as appropriate  - Keep care items and personal belongings within reach  - Initiate and maintain comfort rounds  - Make  Fall Risk Sign visible to staff  - Offer Toileting every 2 Hours, in advance of need  - Initiate/Maintain alarm  - Obtain necessary fall risk management equipment:   - Apply yellow socks and bracelet for high fall risk patients  - Consider moving patient to room near nurses station  9/25/2024 2021 by Sonya Gong RN  Outcome: Progressing  9/25/2024 2020 by Sonya Gong RN  Outcome: Progressing  Goal: Maintain or return to baseline ADL function  Description: INTERVENTIONS:  -  Assess patient's ability to carry out ADLs; assess patient's baseline for ADL function and identify physical deficits which impact ability to perform ADLs (bathing, care of mouth/teeth, toileting, grooming, dressing, etc.)  - Assess/evaluate cause of self-care deficits   - Assess range of motion  - Assess patient's mobility; develop plan if impaired  - Assess patient's need for assistive devices and provide as appropriate  - Encourage maximum independence but intervene and supervise when necessary  - Involve family in performance of ADLs  - Assess for home care needs following discharge   - Consider OT consult to assist with ADL evaluation and planning for discharge  - Provide patient education as appropriate  9/25/2024 2021 by Sonya Gong RN  Outcome: Progressing  9/25/2024 2020 by Sonay Gong RN  Outcome: Progressing  Goal: Maintains/Returns to pre admission functional level  Description: INTERVENTIONS:  - Perform AM-PAC 6 Click Basic Mobility/ Daily Activity assessment daily.  - Set and communicate daily mobility goal to care team and patient/family/caregiver.   - Collaborate with rehabilitation services on mobility goals if consulted  - Perform Range of Motion 4 times a day.  - Reposition patient every 2 hours.  - Dangle patient 3 times a day  - Stand patient 3 times a day  - Ambulate patient 3 times a day  - Out of bed to chair 3 times a day   - Out of bed for meals 3 times a day  - Out of bed for toileting  - Record patient  progress and toleration of activity level   9/25/2024 2021 by Sonya Gong RN  Outcome: Progressing  9/25/2024 2020 by Sonya Gong RN  Outcome: Progressing     Problem: DISCHARGE PLANNING  Goal: Discharge to home or other facility with appropriate resources  Description: INTERVENTIONS:  - Identify barriers to discharge w/patient and caregiver  - Arrange for needed discharge resources and transportation as appropriate  - Identify discharge learning needs (meds, wound care, etc.)  - Arrange for interpretive services to assist at discharge as needed  - Refer to Case Management Department for coordinating discharge planning if the patient needs post-hospital services based on physician/advanced practitioner order or complex needs related to functional status, cognitive ability, or social support system  9/25/2024 2021 by Sonya Gong RN  Outcome: Progressing  9/25/2024 2020 by Sonya Gong RN  Outcome: Progressing     Problem: Knowledge Deficit  Goal: Patient/family/caregiver demonstrates understanding of disease process, treatment plan, medications, and discharge instructions  Description: Complete learning assessment and assess knowledge base.  Interventions:  - Provide teaching at level of understanding  - Provide teaching via preferred learning methods  9/25/2024 2021 by Sonya Gong RN  Outcome: Progressing  9/25/2024 2020 by Sonya Gong RN  Outcome: Progressing     Problem: Prexisting or High Potential for Compromised Skin Integrity  Goal: Skin integrity is maintained or improved  Description: INTERVENTIONS:  - Identify patients at risk for skin breakdown  - Assess and monitor skin integrity  - Assess and monitor nutrition and hydration status  - Monitor labs   - Assess for incontinence   - Turn and reposition patient  - Assist with mobility/ambulation  - Relieve pressure over bony prominences  - Avoid friction and shearing  - Provide appropriate hygiene as needed including keeping skin clean and  dry  - Evaluate need for skin moisturizer/barrier cream  - Collaborate with interdisciplinary team   - Patient/family teaching  - Consider wound care consult   9/25/2024 2021 by Sonya Gong RN  Outcome: Progressing  9/25/2024 2020 by Sonya Gong RN  Outcome: Progressing

## 2024-09-26 NOTE — PROGRESS NOTES
"Progress Note - Hospitalist   Name: La Garcia 74 y.o. female I MRN: [id number]  Unit/Bed#: -01 I Date of Admission: 9/23/2024   Date of Service: 9/26/2024 I Hospital Day: 3     Assessment & Plan  Fracture of neck of right femur (HCC)  Resulted after a mechanical fall on right thigh  X-rays showing right femoral intertrochanteric fracture  S/p right hip repair on 09/24  PT and OT evaluated recommending rehab  Ortho cleared for discharge, recommend aspirin 325 mg twice a day on discharge.  Will need follow-up in 2 weeks in office with Ortho  Pending medical clearance, currently accepted to [facility]  Fall  Presented with a mechanical fall on her right thigh  Denies loss of consciousness or head strike  With right hip fracture following surgery  See under fracture of right femur  Type 2 diabetes mellitus, without long-term current use of insulin ([facility])  Lab Results   Component Value Date    HGBA1C 6.8 (H) 09/23/2024       Recent Labs     09/25/24  1636 09/25/24  2209 09/26/24  0720 09/26/24  1050   POCGLU 356* 253* 145* 221*       Blood Sugar Average: Last 72 hrs:  (P) [id number]  Home regimen consist of glimepiride and Actos  Hold home oral antidiabetic agents  Continue Lantus 10 units once daily while inpatient    Class 2 obesity due to excess calories in adult  Counseling on lifestyle modifications  Primary hypertension  /55 (BP Location: Right arm)   Pulse 88   Temp 97.9 °F (36.6 °C) (Oral)   Resp 16   Ht 5' 2\" (1.575 m)   Wt 96 kg (211 lb 10.3 oz)   SpO2 95%   BMI 38.71 kg/m²     Blood pressure elevated at the time of arrival, secondary to pain  Continue amlodipine 5 mg daily with BP hold parameters  Stage 4 chronic kidney disease (HCC)  Lab Results   Component Value Date    EGFR 15 09/26/2024    EGFR 19 09/25/2024    EGFR 25 09/24/2024    CREATININE 2.87 (H) 09/26/2024    CREATININE 2.35 (H) 09/25/2024    CREATININE 1.87 (H) 09/24/2024     Creatinine baseline of about " 1.7-1.9  Now with concerns of AMANDA, avoid nephrotoxic agents  Mixed hyperlipidemia  Intolerant to statin  Outpatient PCP follow-up  AMANDA (acute kidney injury) (HCC)  Baseline creatinine of 1.7-1.9  Creatinine of 2.87 today  Consult nephrology  Check renal ultrasound, urine labs  ABLA (acute blood loss anemia)  Suspect likely postoperatively with right hip repair  Hemoglobin 7.6 today, on admission noted to be 10.8  No evidence of bleeding, monitor at this time    VTE Pharmacologic Prophylaxis:   Pharmacologic: Heparin  Mechanical VTE Prophylaxis in Place: Yes    Current Length of Stay: 3 day(s)    Current Patient Status: Inpatient   Certification Statement: The patient will continue to require additional inpatient hospital stay due to monitor renal functions    Discharge Plan: pending    Code Status: Level 1 - Full Code      Subjective:   Patient reports doing well overall today.  Does have some discomfort with her right hip.  Was able to work with physical therapy with some pain when bearing weight.  Denies any fevers or chills.  Has been tolerating diet.  Voiding without difficulty.    Objective:     Vitals:   Temp (24hrs), Av.3 °F (36.8 °C), Min:97.9 °F (36.6 °C), Max:98.9 °F (37.2 °C)    Temp:  [97.9 °F (36.6 °C)-98.9 °F (37.2 °C)] 97.9 °F (36.6 °C)  HR:  [87-89] 88  Resp:  [16-18] 16  BP: (101-163)/(52-75) 101/55  SpO2:  [92 %-98 %] 95 %  Body mass index is 38.71 kg/m².     Input and Output Summary (last 24 hours):       Intake/Output Summary (Last 24 hours) at 2024 1225  Last data filed at 2024 0416  Gross per 24 hour   Intake 300 ml   Output 640 ml   Net -340 ml       Physical Exam:     Physical Exam  Vitals and nursing note reviewed.   Constitutional:       Appearance: Normal appearance.   HENT:      Head: Normocephalic.   Eyes:      Conjunctiva/sclera: Conjunctivae normal.   Cardiovascular:      Rate and Rhythm: Normal rate.   Pulmonary:      Effort: Pulmonary effort is normal. No respiratory  distress.   Abdominal:      Palpations: Abdomen is soft.      Tenderness: There is no abdominal tenderness.   Musculoskeletal:         General: No swelling. Normal range of motion.   Skin:     General: Skin is warm.   Neurological:      General: No focal deficit present.      Mental Status: She is alert. Mental status is at baseline.         Additional Data:     Labs:    Results from last 7 days   Lab Units 09/26/24  0452 09/24/24  0522 09/23/24  2255   WBC Thousand/uL 9.00   < > 11.09*   HEMOGLOBIN g/dL 7.6*   < > 10.8*   HEMATOCRIT % 24.3*   < > 34.1*   PLATELETS Thousands/uL 181   < > 237   SEGS PCT %  --   --  80*   LYMPHO PCT %  --   --  13*   MONO PCT %  --   --  6   EOS PCT %  --   --  1    < > = values in this interval not displayed.     Results from last 7 days   Lab Units 09/26/24  0452 09/25/24  0433 09/24/24  0522   SODIUM mmol/L 133*   < > 138   POTASSIUM mmol/L 4.9   < > 5.4*   CHLORIDE mmol/L 102   < > 110*   CO2 mmol/L 19*   < > 20*   BUN mg/dL 68*   < > 43*   CREATININE mg/dL 2.87*   < > 1.87*   ANION GAP mmol/L 12   < > 8   CALCIUM mg/dL 9.1   < > 9.5   ALBUMIN g/dL  --   --  3.6   TOTAL BILIRUBIN mg/dL  --   --  0.30   ALK PHOS U/L  --   --  74   ALT U/L  --   --  20   AST U/L  --   --  35   GLUCOSE RANDOM mg/dL 175*   < > 141*    < > = values in this interval not displayed.     Results from last 7 days   Lab Units 09/23/24  2255   INR  1.15     Results from last 7 days   Lab Units 09/26/24  1050 09/26/24  0720 09/25/24  2209 09/25/24  1636 09/25/24  1113 09/25/24  0712 09/24/24  2110 09/24/24  1548 09/24/24  1059 09/24/24  0830 09/24/24  0018   POC GLUCOSE mg/dl 221* 145* 253* 356* 322* 244* 197* 92 85 91 134     Results from last 7 days   Lab Units 09/23/24  2365   HEMOGLOBIN A1C % 6.8*               * I Have Reviewed All Lab Data Listed Above.  * Additional Pertinent Lab Tests Reviewed: All Labs For Current Hospital Admission Reviewed    Mobility:  Basic Mobility Inpatient Raw Score: 11  JH-M  Goal: 4: Move to chair/commode  JH-M Achieved: 4: Move to chair/commode    Lines:     Invasive Devices       Peripheral Intravenous Line  Duration             Peripheral IV 09/23/24 Left Antecubital 2 days                       Imaging:    Imaging Reports Reviewed Today Include:     XR femur 2 vw right    Result Date: 9/25/2024  Impression: Fluoroscopy provided for procedure guidance. Please refer to the separate procedure note for additional details. Workstation performed: [id number]     XR chest 1 view portable    Result Date: 9/24/2024  Impression: No acute cardiopulmonary disease. Workstation performed: [id number]     XR femur 2 views RIGHT    Result Date: 9/24/2024  Impression: Acute displaced angulated right femoral intertrochanteric fracture. Distal femur is intact. Clinical provider is aware of the findings. Computerized Assisted Algorithm (CAA) may have been used to analyze all applicable images. Workstation performed: [id number]     XR hip/pelv 2-3 vws right    Result Date: 9/24/2024  Impression: Acute displaced angulated right femoral intertrochanteric fracture. Clinical provider is aware of the findings. Computerized Assisted Algorithm (CAA) may have been used to analyze all applicable images. Workstation performed: [id number]        Recent Cultures (last 7 days):           Last 24 Hours Medication List:   Current Facility-Administered Medications   Medication Dose Route Frequency Provider Last Rate    acetaminophen  650 mg Oral Q6H PRN Denise Cho MD      amLODIPine  5 mg Oral Daily Denise Coh MD      cholecalciferol  1,000 Units Oral Daily Denise Cho MD      heparin (porcine)  5,000 Units Subcutaneous Q8H MORIS Denise Cho MD      HYDROmorphone  0.2 mg Intravenous Q3H PRN Denise Cho MD      insulin glargine  10 Units Subcutaneous QAM Sumanth Tello MD      insulin lispro  1-5 Units Subcutaneous HS Denise Cho MD      insulin lispro  1-5 Units Subcutaneous TID AC Denise Cho MD      ondansetron  4 mg  Intravenous Q6H PRN Denise Cho MD      oxyCODONE  5 mg Oral Q4H PRN Denise Cho MD      polyethylene glycol  17 g Oral Daily Sumanth Tello MD      senna-docusate sodium  1 tablet Oral BID Sumanth Tello MD          Today, Patient Was Seen By: Sumanth Tello MD    ** Please Note: Dictation voice to text software may have been used in the creation of this document. **

## 2024-09-26 NOTE — ASSESSMENT & PLAN NOTE
-Etiology: ***  -Admission creatinine 1.79 on 9/23/2024.  Today's creatinine ***  -Peak creatinine 2.87 on 9/26/2024  -baseline creatinine 1.7-2.0 since 2019  -workup: UA with trace protein, 0-1 RBC, 4-10 WBC  -Imaging: Renal ultrasound with***  -nonoliguric  -Renal function***  -No urgent indication for renal replacement therapy (dialysis)  -Strict I/Os, daily weights  -Avoid nephrotoxins, NSAIDs, IV contrast if possible  -Avoid hypotension.  Maintain MAP >65  -Trend BMP in a.m.  -Adjust meds to appropriate GFR  -Optimize hemodynamic status to avoid delay in renal recovery  -Limit narcotics

## 2024-09-26 NOTE — ASSESSMENT & PLAN NOTE
S/p right hip IM nail fixation 9/24/2024  WBAT RLE  PT/OT  Pain control  Currently on herparin for DVT PPX, rec aspirin 325mg BID for 35 days on discharge  F/u Dr. Peters 2 weeks from date of surgery

## 2024-09-26 NOTE — ASSESSMENT & PLAN NOTE
"/55 (BP Location: Right arm)   Pulse 88   Temp 97.9 °F (36.6 °C) (Oral)   Resp 16   Ht 5' 2\" (1.575 m)   Wt 96 kg (211 lb 10.3 oz)   SpO2 95%   BMI 38.71 kg/m²     Blood pressure elevated at the time of arrival, secondary to pain  Continue amlodipine 5 mg daily with BP hold parameters  "

## 2024-09-26 NOTE — PROGRESS NOTES
Progress Note - Orthopedics   Name: La Garcia 74 y.o. female I MRN: [id number]  Unit/Bed#: -01 I Date of Admission: 9/23/2024   Date of Service: 9/26/2024 I Hospital Day: 3    Assessment & Plan  Fracture of neck of right femur (HCC)  S/p right hip IM nail fixation 9/24/2024  WBAT RLE  PT/OT  Pain control  Currently on herparin for DVT PPX, rec aspirin 325mg BID for 35 days on discharge  F/u Dr. Peters 2 weeks from date of surgery    Fall      Ok for discharge from Orthopedics service perspective.    History of Present Illness   74 y.o.female  No acute events, no new complaints. Pain well controlled. Denies fevers, chills, CP, SOB, N/V, numbness or tingling. Patient reports no issues with urination or bowel movements. Patient states pain is well-controlled with medication.  Patient states she is able to bear weight on the right lower extremity.    Objective      Temp:  [97.7 °F (36.5 °C)-98.9 °F (37.2 °C)] 98.9 °F (37.2 °C)  HR:  [87-97] 89  Resp:  [16-18] 16  BP: (123-163)/(52-75) 163/75  O2 Device: None (Room air)          I/O         09/24 0701  09/25 0700 09/25 0701 09/26 0700 09/26 0701 09/27 0700    P.O. 200 300     I.V. (mL/kg) 700 (7.3)      Total Intake(mL/kg) 900 (9.4) 300 (3.1)     Urine (mL/kg/hr) 800 (0.3) 640 (0.3)     Emesis/NG output       Stool       Total Output 800 640     Net +100 -340            Unmeasured Urine Occurrence 2 x            Lines/Drains/Airways       Active Status       None                  Physical Exam Musculoskeletal: rightlower  Skin intact surrounding dressings. No erythema or ecchymosis.  Dressing intact without saturation  Active motion of the right hip with minimal pain calf nontender palpation active ankle dorsi and plantar flexion active great toe flexion extension superficial deep peroneal nerve sensation intact        Lab Results: I have reviewed the following results:   Recent Labs     09/23/24 2255 09/24/24  0522 09/25/24  0433 09/26/24  0452   WBC 11.09*  "7.47 9.17 9.00   HGB 10.8* 9.7* 9.4* 7.6*   HCT 34.1* 31.4* 30.9* 24.3*    218 213 181   BUN 43* 43* 46* 68*   CREATININE 1.79* 1.87* 2.35* 2.87*   PTT 28  --   --   --    INR 1.15  --   --   --      Blood Culture:  No results found for: \"BLOODCX\"  Wound Culture: No results found for: \"WOUNDCULT\"  "

## 2024-09-26 NOTE — ASSESSMENT & PLAN NOTE
-BP ***  -volume status ***  -Home Rx: Amlodipine 5 mg daily  -Current Rx: Amlodipine 5 mg daily  -Optimize hemodynamic status to avoid delay in renal recovery  -recommend hold parameters on antihypertensive's for SBP <130 mmHg.  -Avoid hypotension or fluctuations in blood pressure.  Maintain MAP >65  -low sodium (2 gm) diet  -continue to trend

## 2024-09-26 NOTE — ASSESSMENT & PLAN NOTE
Baseline creatinine of 1.7-1.9  Creatinine of 2.87 today  Consult nephrology  Check renal ultrasound, urine labs

## 2024-09-26 NOTE — ASSESSMENT & PLAN NOTE
-Presenting after mechanical fall  -With associated right femoral intertrochanteric fracture  -PT/OT  -Management per primary team

## 2024-09-26 NOTE — ASSESSMENT & PLAN NOTE
Lab Results   Component Value Date    EGFR 15 09/26/2024    EGFR 19 09/25/2024    EGFR 25 09/24/2024    CREATININE 2.87 (H) 09/26/2024    CREATININE 2.35 (H) 09/25/2024    CREATININE 1.87 (H) 09/24/2024     Creatinine baseline of about 1.7-1.9  Now with concerns of AMANDA, avoid nephrotoxic agents

## 2024-09-26 NOTE — CASE MANAGEMENT
[facility] received request for authorization from Care Manager.  Authorization request submitted for: SNF  Facility Name: [facility]   NPI: [id number]   Facility MD:  Dr. Lopes   NPI: [id number]   Authorization initiated by contacting insurance:  [employer]   Via: H&CC Portal   Clinicals submitted via Portal attachment   Pending Reference #:[id number]     Care Manager notified: Chanelle Palomino      Updates to authorization status will be noted in chart. Please reach out to CM for updates on any clinical information.

## 2024-09-27 ENCOUNTER — NURSING HOME VISIT (OUTPATIENT)
Dept: GERIATRICS | Facility: OTHER | Age: 74
End: 2024-09-27
Payer: COMMERCIAL

## 2024-09-27 VITALS
OXYGEN SATURATION: 97 % | DIASTOLIC BLOOD PRESSURE: 58 MMHG | WEIGHT: 211.64 LBS | HEIGHT: 62 IN | HEART RATE: 84 BPM | TEMPERATURE: 99.3 F | BODY MASS INDEX: 38.95 KG/M2 | RESPIRATION RATE: 16 BRPM | SYSTOLIC BLOOD PRESSURE: 131 MMHG

## 2024-09-27 DIAGNOSIS — E11.9 TYPE 2 DIABETES MELLITUS WITHOUT COMPLICATION, WITHOUT LONG-TERM CURRENT USE OF INSULIN (HCC): ICD-10-CM

## 2024-09-27 DIAGNOSIS — N18.4 STAGE 4 CHRONIC KIDNEY DISEASE (HCC): ICD-10-CM

## 2024-09-27 DIAGNOSIS — Z71.89 ADVANCED DIRECTIVES, COUNSELING/DISCUSSION: ICD-10-CM

## 2024-09-27 DIAGNOSIS — E87.20 NORMAL ANION GAP METABOLIC ACIDOSIS: ICD-10-CM

## 2024-09-27 DIAGNOSIS — S72.001D CLOSED FRACTURE OF NECK OF RIGHT FEMUR WITH ROUTINE HEALING, SUBSEQUENT ENCOUNTER: Primary | ICD-10-CM

## 2024-09-27 DIAGNOSIS — R26.2 AMBULATORY DYSFUNCTION: ICD-10-CM

## 2024-09-27 PROBLEM — E78.2 MIXED HYPERLIPIDEMIA: Status: RESOLVED | Noted: 2024-09-23 | Resolved: 2024-09-27

## 2024-09-27 LAB
ANION GAP SERPL CALCULATED.3IONS-SCNC: 11 MMOL/L (ref 4–13)
BUN SERPL-MCNC: 71 MG/DL (ref 5–25)
CALCIUM SERPL-MCNC: 9.3 MG/DL (ref 8.4–10.2)
CHLORIDE SERPL-SCNC: 104 MMOL/L (ref 96–108)
CO2 SERPL-SCNC: 20 MMOL/L (ref 21–32)
CREAT SERPL-MCNC: 2.6 MG/DL (ref 0.6–1.3)
ERYTHROCYTE [DISTWIDTH] IN BLOOD BY AUTOMATED COUNT: 14.1 % (ref 11.6–15.1)
GFR SERPL CREATININE-BSD FRML MDRD: 17 ML/MIN/1.73SQ M
GLUCOSE SERPL-MCNC: 122 MG/DL (ref 65–140)
GLUCOSE SERPL-MCNC: 124 MG/DL (ref 65–140)
GLUCOSE SERPL-MCNC: 298 MG/DL (ref 65–140)
HCT VFR BLD AUTO: 24.6 % (ref 34.8–46.1)
HGB BLD-MCNC: 7.7 G/DL (ref 11.5–15.4)
MCH RBC QN AUTO: 30.1 PG (ref 26.8–34.3)
MCHC RBC AUTO-ENTMCNC: 31.3 G/DL (ref 31.4–37.4)
MCV RBC AUTO: 96 FL (ref 82–98)
PLATELET # BLD AUTO: 212 THOUSANDS/UL (ref 149–390)
PMV BLD AUTO: 9.9 FL (ref 8.9–12.7)
POTASSIUM SERPL-SCNC: 4.8 MMOL/L (ref 3.5–5.3)
RBC # BLD AUTO: 2.56 MILLION/UL (ref 3.81–5.12)
SODIUM SERPL-SCNC: 135 MMOL/L (ref 135–147)
WBC # BLD AUTO: 7.92 THOUSAND/UL (ref 4.31–10.16)

## 2024-09-27 PROCEDURE — 97116 GAIT TRAINING THERAPY: CPT

## 2024-09-27 PROCEDURE — 97530 THERAPEUTIC ACTIVITIES: CPT

## 2024-09-27 PROCEDURE — 99222 1ST HOSP IP/OBS MODERATE 55: CPT | Performed by: STUDENT IN AN ORGANIZED HEALTH CARE EDUCATION/TRAINING PROGRAM

## 2024-09-27 PROCEDURE — 99239 HOSP IP/OBS DSCHRG MGMT >30: CPT | Performed by: STUDENT IN AN ORGANIZED HEALTH CARE EDUCATION/TRAINING PROGRAM

## 2024-09-27 PROCEDURE — 85027 COMPLETE CBC AUTOMATED: CPT | Performed by: STUDENT IN AN ORGANIZED HEALTH CARE EDUCATION/TRAINING PROGRAM

## 2024-09-27 PROCEDURE — 99305 1ST NF CARE MODERATE MDM 35: CPT | Performed by: INTERNAL MEDICINE

## 2024-09-27 PROCEDURE — 99024 POSTOP FOLLOW-UP VISIT: CPT | Performed by: PHYSICIAN ASSISTANT

## 2024-09-27 PROCEDURE — 80048 BASIC METABOLIC PNL TOTAL CA: CPT | Performed by: STUDENT IN AN ORGANIZED HEALTH CARE EDUCATION/TRAINING PROGRAM

## 2024-09-27 PROCEDURE — 82948 REAGENT STRIP/BLOOD GLUCOSE: CPT

## 2024-09-27 RX ORDER — SODIUM BICARBONATE 650 MG/1
650 TABLET ORAL DAILY
Qty: 30 TABLET | Refills: 0 | Status: SHIPPED | OUTPATIENT
Start: 2024-09-27 | End: 2024-10-27

## 2024-09-27 RX ORDER — ASPIRIN 325 MG
325 TABLET ORAL 2 TIMES DAILY WITH MEALS
Start: 2024-09-27 | End: 2024-11-01

## 2024-09-27 RX ORDER — ASPIRIN 81 MG/1
81 TABLET ORAL DAILY
Start: 2024-09-27 | End: 2024-09-27

## 2024-09-27 RX ORDER — ASPIRIN 81 MG/1
81 TABLET ORAL DAILY
Start: 2024-09-27

## 2024-09-27 RX ORDER — ASPIRIN 325 MG
325 TABLET ORAL 2 TIMES DAILY WITH MEALS
Start: 2024-09-27 | End: 2024-09-27

## 2024-09-27 RX ORDER — SODIUM BICARBONATE 650 MG/1
650 TABLET ORAL DAILY
Status: DISCONTINUED | OUTPATIENT
Start: 2024-09-27 | End: 2024-09-27 | Stop reason: HOSPADM

## 2024-09-27 RX ORDER — OXYCODONE HYDROCHLORIDE 5 MG/1
5 TABLET ORAL EVERY 6 HOURS PRN
Qty: 10 TABLET | Refills: 0 | Status: SHIPPED | OUTPATIENT
Start: 2024-09-27

## 2024-09-27 RX ADMIN — SENNOSIDES AND DOCUSATE SODIUM 1 TABLET: 50; 8.6 TABLET ORAL at 08:44

## 2024-09-27 RX ADMIN — INSULIN LISPRO 3 UNITS: 100 INJECTION, SOLUTION INTRAVENOUS; SUBCUTANEOUS at 11:36

## 2024-09-27 RX ADMIN — INSULIN GLARGINE 10 UNITS: 100 INJECTION, SOLUTION SUBCUTANEOUS at 08:44

## 2024-09-27 RX ADMIN — Medication 1000 UNITS: at 08:44

## 2024-09-27 RX ADMIN — SODIUM BICARBONATE 650 MG: 650 TABLET ORAL at 10:24

## 2024-09-27 RX ADMIN — OXYCODONE HYDROCHLORIDE 5 MG: 5 TABLET ORAL at 08:50

## 2024-09-27 RX ADMIN — AMLODIPINE BESYLATE 5 MG: 5 TABLET ORAL at 08:44

## 2024-09-27 RX ADMIN — HEPARIN SODIUM 5000 UNITS: 5000 INJECTION, SOLUTION INTRAVENOUS; SUBCUTANEOUS at 08:44

## 2024-09-27 NOTE — CASE MANAGEMENT
Case Management Discharge Planning Note    Patient name La Garcia  Location /-01 MRN [id number]  : 1950 Date 2024       Current Admission Date: 2024  Current Admission Diagnosis:Fracture of neck of right femur (HCC)   Patient Active Problem List    Diagnosis Date Noted Date Diagnosed    AMANDA (acute kidney injury) (HCC) 2024     ABLA (acute blood loss anemia) 2024     Chronic kidney disease-mineral and bone disorder 2024     Normal anion gap metabolic acidosis 2024     Fall 2024     Fracture of neck of right femur (HCC) 2024     Type 2 diabetes mellitus, without long-term current use of insulin (HCC) 2024     Class 2 obesity due to excess calories in adult 2024     Primary hypertension 2024     Stage 4 chronic kidney disease (HCC) 2024     Statin myopathy 2024     Stress incontinence of urine 2023     Hyperkalemia 2022     Acquired hallux valgus of both feet 2020     Pes planus 2020     Primary localized osteoarthritis of left ankle 2020     Venous insufficiency 2020     Hepatic steatosis 2015     Cortical senile cataract of both eyes 2015     Generalized anxiety disorder 2015     Osteoarthritis of both knees 2015     Proteinuria due to type 2 diabetes mellitus  (HCC) 2015       LOS (days): 4  Geometric Mean LOS (GMLOS) (days): 2.8  Days to GMLOS:-0.7     OBJECTIVE:  Risk of Unplanned Readmission Score: 14.53     Current admission status: Inpatient   Preferred Pharmacy:   [facility] [address] [place] [address] - [address] [address]  Phone: [phone] Fax: [phone]    Primary Care Provider: Deion Grimes MD    Primary Insurance: [employer] [facility]  Secondary Insurance:     DISCHARGE DETAILS:    Discharge planning discussed with:: Patient, Patient's Son/Brother  Freedom of Choice: Yes     CM contacted  family/caregiver?: Yes  Were Treatment Team discharge recommendations reviewed with patient/caregiver?: Yes  Did patient/caregiver verbalize understanding of patient care needs?: N/A- going to facility  Were patient/caregiver advised of the risks associated with not following Treatment Team discharge recommendations?: Yes    Contacts  Patient Contacts: Joel Covarrubias (brother)  Relationship to Patient:: Family  Contact Method: Phone, In Person  Phone Number: [phone]  Reason/Outcome: Emergency Contact, Discharge Planning    Requested Home Health Care         Is the patient interested in HHC at discharge?: No    DME Referral Provided  Referral made for DME?: Yes  DME referral completed for the following items:: [facility]  DME Supplier Name:: [employer]    Other Referral/Resources/Interventions Provided:  Interventions: Short Term Rehab    Would you like to participate in our [facility] service program?  : No - Declined    Treatment Team Recommendation: Short Term Rehab  Discharge Destination Plan:: Short Term Rehab  Transport at Discharge : Wheelchair van    ETA of Transport (Date): 09/27/24  ETA of Transport (Time): 1300    IMM Given (Date):: 09/27/24  IMM Given to:: Patient  IMM reviewed with patient and caregiver, patient and caregiver agrees with discharge determination.     Accepting Facility Name, City & State : [facility]: [address] [place] [address]  Receiving Facility/Agency Phone Number: [phone]  Facility/Agency Fax Number: [phone]

## 2024-09-27 NOTE — CONSULTS
NEPHROLOGY HOSPITAL CONSULTATION   La Garcia 74 y.o. female MRN: [id number]  Unit/Bed#: -01 Encounter: [id number]    Brief History of Admission - 74 y.o. woman  with PMH of obesity, hypertension, hyperlipidemia, diabetes.  Patient admitted after mechanical fall complicated with right femoral fracture underwent surgical repair .  Nephrology is consulted for management of AMANDA   Assessment & Plan  AMANDA (acute kidney injury) (HCC)  AMANDA stage I on CKD G4 with evidence of kidney recovery  Etiology: Likely secondary to hemodynamic changes in the settings of blood loss  Baseline creatinine: 1.7 to 1.9 mg/dL  Current creatinine: 2.6 mg/dL, trending down  Peak creatinine: 2.87 mg/dL  UA: Leukocyturia with no bacteriuria  Treatment:  No urgent indication of dialysis at this time  Maintain MAP:  Over 65 mmHg if possible/avoid hypoperfusion:  Hold parameters on blood pressure medications  Avoid nephrotoxic agents such as NSAIDs, and IV contrast if possible. Avoid opioids   Adjust medications to GFR  Patient is a stable for discharge from  end can follow-up with nephrology    Stage 4 chronic kidney disease (HCC)  Lab Results   Component Value Date    EGFR 17 09/27/2024    EGFR 15 09/26/2024    EGFR 19 09/25/2024    CREATININE 2.60 (H) 09/27/2024    CREATININE 2.87 (H) 09/26/2024    CREATININE 2.35 (H) 09/25/2024   Baseline creatinine: 1.7 to 1.9 mg/dL  Etiology: Likely secondary to nephrosclerosis in the settings of hypertension  Follow-up with nephrology on discharge  Primary hypertension  Volume:  slightly hypervolemic on exam with trace lower extremity edema  Blood pressure: Normotensive, /58mmhg   Treatment:  Recommend low-sodium diet  Continue Lidopin 5 mg daily  Normal anion gap metabolic acidosis  #Acid-base Disorder  serum HCO3 20mmol/L  Metabolic acidosis secondary to AMANDA  Start sodium bicarbonate once a day  ABLA (acute blood loss anemia)  Current hemoglobin:7.7 from 10.8  Etiology secondary to blood  "loss  Treatment:  Transfuse for hemoglobin less than 7.0 per primary service    No indication of ARACELI at this time  Chronic kidney disease-mineral and bone disorder  Lab Results   Component Value Date    EGFR 17 09/27/2024    EGFR 15 09/26/2024    EGFR 19 09/25/2024    CREATININE 2.60 (H) 09/27/2024    CREATININE 2.87 (H) 09/26/2024    CREATININE 2.35 (H) 09/25/2024   Calcium 9.3 mg/dL  No indication of phosphorus binders at this time   PTH as an outpatient    Fracture of neck of right femur (HCC)  Status post surgical repair  Management as per orthopedics  Fall  PT OT  Type 2 diabetes mellitus, without long-term current use of insulin ([facility])  Lab Results   Component Value Date    HGBA1C 6.8 (H) 09/23/2024       Recent Labs     09/26/24  0720 09/26/24  1050 09/26/24  1620 09/27/24  0700   POCGLU 145* 221* 204* 122       Blood Sugar Average: Last 72 hrs:  (P) 189.0129311525375583    HbA1c 6.8  Advised to maintain a good DM control to prevent progression of CKD   Maintain healthy diet (vegetables, fruits, whole grains, nonfat or low fat)  Weight loss  Physical activity (5 to 10 minutes to start the increase to 30 min a day)    Class 2 obesity due to excess calories in adult  BMI 38.71  Recommend weight loss , heathy diet  Lifestyle modification        I have reviewed the nephrology recommendations including follow-up with nephrology on discharge , with primary team, and we are in agreement with renal plan including the information outlined above.    Portions of the record may have been created with voice recognition software. Occasional wrong word or \"sound a like\" substitutions may have occurred due to the inherent limitations of voice recognition software. Read the chart carefully and recognize, using context, where substitutions have occurred.If you have any questions, please contact the dictating provider.    HISTORY OF PRESENT ILLNESS:  Requesting Physician: Sumanth Tello MD  Reason for Consult: AMANDA JONES" Jose is a 74 y.o. female with PMH of obesity, hypertension, hyperlipidemia, diabetes.  Patient admitted after mechanical fall complicated with right femoral fracture underwent surgical repair . A renal consultation is requested today for assistance in the management of AMANDA    PAST MEDICAL HISTORY:  Past Medical History:   Diagnosis Date    Arthritis     Diabetes mellitus (HCC)     Hypertension     Renal disorder        PAST SURGICAL HISTORY:  Past Surgical History:   Procedure Laterality Date    APPENDECTOMY      CHOLECYSTECTOMY      HERNIA REPAIR      MT OPTX FEM SHFT FX W/INSJ IMED IMPLT W/WO SCREW Right 9/24/2024    Procedure: INSERTION NAIL IM FEMUR ANTEGRADE (TROCHANTERIC)- right;  Surgeon: John Peters DO;  Location:  MAIN OR;  Service: Orthopedics       ALLERGIES:  No Known Allergies    SOCIAL HISTORY:  Social History     Substance and Sexual Activity   Alcohol Use Not Currently     Social History     Substance and Sexual Activity   Drug Use Not Currently     Social History     Tobacco Use   Smoking Status Never   Smokeless Tobacco Never       FAMILY HISTORY:  History reviewed. No pertinent family history.    MEDICATIONS:    Current Facility-Administered Medications:     acetaminophen (TYLENOL) tablet 650 mg, 650 mg, Oral, Q6H PRN, Denise Cho MD    amLODIPine (NORVASC) tablet 5 mg, 5 mg, Oral, Daily, Samanta Gomez PA-C, 5 mg at 09/27/24 0844    Cholecalciferol (VITAMIN D3) tablet 1,000 Units, 1,000 Units, Oral, Daily, Denise Cho MD, 1,000 Units at 09/27/24 0844    heparin (porcine) subcutaneous injection 5,000 Units, 5,000 Units, Subcutaneous, Q8H MORIS, Denise Cho MD, 5,000 Units at 09/27/24 0844    HYDROmorphone HCl (DILAUDID) injection 0.2 mg, 0.2 mg, Intravenous, Q3H PRN, Denise Cho MD, 0.2 mg at 09/24/24 1525    insulin glargine (LANTUS) subcutaneous injection 10 Units 0.1 mL, 10 Units, Subcutaneous, QASumanth MD, 10 Units at 09/27/24 0844    insulin lispro (HumALOG/ADMELOG) 100  units/mL subcutaneous injection 1-5 Units, 1-5 Units, Subcutaneous, HS, Denise Cho MD, 1 Units at 09/26/24 2136    insulin lispro (HumALOG/ADMELOG) 100 units/mL subcutaneous injection 1-5 Units, 1-5 Units, Subcutaneous, TID AC, 1 Units at 09/26/24 1740 **AND** Fingerstick Glucose (POCT), , , TID AC, Denise Cho MD    ondansetron (ZOFRAN) injection 4 mg, 4 mg, Intravenous, Q6H PRN, Denise Cho MD, 4 mg at 09/24/24 0015    oxyCODONE (ROXICODONE) IR tablet 5 mg, 5 mg, Oral, Q4H PRN, Denise Cho MD, 5 mg at 09/27/24 0850    senna-docusate sodium (SENOKOT S) 8.6-50 mg per tablet 1 tablet, 1 tablet, Oral, BID, Sumanth Tello MD, 1 tablet at 09/27/24 0844    REVIEW OF SYSTEMS:  Constitutional: Negative for fatigue, anorexia, fever, chills, diaphoresis  HENT: Negative for postnasal drip  Eyes: Negative for visual disturbance.   Respiratory: Negative for cough, shortness of breath and wheezing.   Cardiovascular: Negative for chest pain, palpitations and leg swelling.   Gastrointestinal: Negative for abdominal pain, constipation, diarrhea, nausea and vomiting.   Genitourinary: No dysuria, hematuria  Endocrine: Negative for polyuria.   Musculoskeletal:RLE pain .   Skin: Negative for rash.   Neurological: Negative for focal weakness, headaches, dizziness.  Hematological: Negative for easy bruising or bleeding.  Psychiatric/Behavioral: Negative for confusion and sleep disturbance.   All the systems were reviewed and were negative except as documented on the HPI.    PHYSICAL EXAM:  Current Weight: Weight - Scale: 96 kg (211 lb 10.3 oz)  First Weight: Weight - Scale: 96.2 kg (212 lb)  Vitals:    09/26/24 0820 09/26/24 1443 09/26/24 2241 09/27/24 0731   BP: 101/55 146/58 128/56 131/58   BP Location: Right arm Right arm Right arm Left arm   Pulse: 88 90 89 84   Resp: 16 16 16 16   Temp: 97.9 °F (36.6 °C) 97.7 °F (36.5 °C) 97.9 °F (36.6 °C) 99.3 °F (37.4 °C)   TempSrc: Oral Oral Oral Oral   SpO2: 95% 94% 93% 97%   Weight:        Height:           Intake/Output Summary (Last 24 hours) at 9/27/2024 0920  Last data filed at 9/27/2024 0235  Gross per 24 hour   Intake 850 ml   Output 690 ml   Net 160 ml     Physical Exam  General:  no acute distress at this time  Skin:  No acute rash  Eyes:  No scleral icterus and noninjected  ENT:  mucous membranes moist  Neck:  no carotid bruits  Chest:  Clear to auscultation percussion, good respiratory effort, no use of accessory respiratory muscles  CVS:  Regular rate and rhythm without rub   Abdomen:  soft and nontender   Extremities: LE edmea   Neuro:  No gross focality  Psych:  Alert , cooperative       Invasive Devices:      Lab Results:   Results from last 7 days   Lab Units 09/27/24  0502 09/26/24  0452 09/25/24  0433 09/24/24  0522   WBC Thousand/uL 7.92 9.00 9.17 7.47   HEMOGLOBIN g/dL 7.7* 7.6* 9.4* 9.7*   HEMATOCRIT % 24.6* 24.3* 30.9* 31.4*   PLATELETS Thousands/uL 212 181 213 218   POTASSIUM mmol/L 4.8 4.9 5.1 5.4*   CHLORIDE mmol/L 104 102 105 110*   CO2 mmol/L 20* 19* 19* 20*   BUN mg/dL 71* 68* 46* 43*   CREATININE mg/dL 2.60* 2.87* 2.35* 1.87*   CALCIUM mg/dL 9.3 9.1 9.9 9.5   MAGNESIUM mg/dL  --   --   --  2.0   ALK PHOS U/L  --   --   --  74   ALT U/L  --   --   --  20   AST U/L  --   --   --  35

## 2024-09-27 NOTE — PLAN OF CARE
Problem: PAIN - ADULT  Goal: Verbalizes/displays adequate comfort level or baseline comfort level  Description: Interventions:  - Encourage patient to monitor pain and request assistance  - Assess pain using appropriate pain scale  - Administer analgesics based on type and severity of pain and evaluate response  - Implement non-pharmacological measures as appropriate and evaluate response  - Consider cultural and social influences on pain and pain management  - Notify physician/advanced practitioner if interventions unsuccessful or patient reports new pain  Outcome: Progressing     Problem: INFECTION - ADULT  Goal: Absence or prevention of progression during hospitalization  Description: INTERVENTIONS:  - Assess and monitor for signs and symptoms of infection  - Monitor lab/diagnostic results  - Monitor all insertion sites, i.e. indwelling lines, tubes, and drains  - Monitor endotracheal if appropriate and nasal secretions for changes in amount and color  - [place] appropriate cooling/warming therapies per order  - Administer medications as ordered  - Instruct and encourage patient and family to use good hand hygiene technique  - Identify and instruct in appropriate isolation precautions for identified infection/condition  Outcome: Progressing     Problem: SAFETY ADULT  Goal: Patient will remain free of falls  Description: INTERVENTIONS:  - Educate patient/family on patient safety including physical limitations  - Instruct patient to call for assistance with activity   - Consult OT/PT to assist with strengthening/mobility   - Keep Call bell within reach  - Keep bed low and locked with side rails adjusted as appropriate  - Keep care items and personal belongings within reach  - Initiate and maintain comfort rounds  - Make Fall Risk Sign visible to staff  - Offer Toileting every 2 Hours, in advance of need  - Initiate/Maintain bed alarm  - Obtain necessary fall risk management equipment: walker  - Apply yellow  socks and bracelet for high fall risk patients  - Consider moving patient to room near nurses station  Outcome: Progressing     Problem: DISCHARGE PLANNING  Goal: Discharge to home or other facility with appropriate resources  Description: INTERVENTIONS:  - Identify barriers to discharge w/patient and caregiver  - Arrange for needed discharge resources and transportation as appropriate  - Identify discharge learning needs (meds, wound care, etc.)  - Arrange for interpretive services to assist at discharge as needed  - Refer to Case Management Department for coordinating discharge planning if the patient needs post-hospital services based on physician/advanced practitioner order or complex needs related to functional status, cognitive ability, or social support system  Outcome: Progressing     Problem: Knowledge Deficit  Goal: Patient/family/caregiver demonstrates understanding of disease process, treatment plan, medications, and discharge instructions  Description: Complete learning assessment and assess knowledge base.  Interventions:  - Provide teaching at level of understanding  - Provide teaching via preferred learning methods  Outcome: Progressing     Problem: Prexisting or High Potential for Compromised Skin Integrity  Goal: Skin integrity is maintained or improved  Description: INTERVENTIONS:  - Identify patients at risk for skin breakdown  - Assess and monitor skin integrity  - Assess and monitor nutrition and hydration status  - Monitor labs   - Assess for incontinence   - Turn and reposition patient  - Assist with mobility/ambulation  - Relieve pressure over bony prominences  - Avoid friction and shearing  - Provide appropriate hygiene as needed including keeping skin clean and dry  - Evaluate need for skin moisturizer/barrier cream  - Collaborate with interdisciplinary team   - Patient/family teaching  - Consider wound care consult   Outcome: Progressing

## 2024-09-27 NOTE — ASSESSMENT & PLAN NOTE
Lab Results   Component Value Date    EGFR 17 09/27/2024    EGFR 15 09/26/2024    EGFR 19 09/25/2024    CREATININE 2.60 (H) 09/27/2024    CREATININE 2.87 (H) 09/26/2024    CREATININE 2.35 (H) 09/25/2024

## 2024-09-27 NOTE — NURSING NOTE
Pt is discharge to rehab.  All paper work sent with the pt.  Pt has all belongings with her.  No distress noted at this time

## 2024-09-27 NOTE — DISCHARGE SUMMARY
"Discharge Summary - Hospitalist   Name: La Garcia 74 y.o. female I MRN: [id number]  Unit/Bed#: -01 I Date of Admission: 9/23/2024   Date of Service: 9/27/2024 I Hospital Day: 4     Assessment & Plan  Fracture of neck of right femur (HCC)  Resulted after a mechanical fall on right thigh  X-rays showing right femoral intertrochanteric fracture  S/p right hip repair on 09/24  PT and OT evaluated recommending rehab  Ortho cleared for discharge, recommend aspirin 325 mg twice a day on discharge.  Will need follow-up in 2 weeks in office with Ortho  Discharged to [facility] for short-term rehab  Fall  Presented with a mechanical fall on her right thigh  Denies loss of consciousness or head strike  With right hip fracture following surgery  See under fracture of right femur  Type 2 diabetes mellitus, without long-term current use of insulin ([facility])  Lab Results   Component Value Date    HGBA1C 6.8 (H) 09/23/2024       Recent Labs     09/26/24  1050 09/26/24  1620 09/27/24  0700 09/27/24  1119   POCGLU 221* 204* 122 298*       Blood Sugar Average: Last 72 hrs:  (P) 197.8843447792843460  Home regimen consist of glimepiride and Actos  Hold home oral antidiabetic agents  Continue Lantus 10 units once daily while inpatient    Class 2 obesity due to excess calories in adult  Counseling on lifestyle modifications  Primary hypertension  /58 (BP Location: Left arm)   Pulse 84   Temp 99.3 °F (37.4 °C) (Oral)   Resp 16   Ht 5' 2\" (1.575 m)   Wt 96 kg (211 lb 10.3 oz)   SpO2 97%   BMI 38.71 kg/m²     Blood pressure elevated at the time of arrival, secondary to pain  Continue amlodipine 5 mg daily with BP hold parameters  Stage 4 chronic kidney disease ([facility])  Lab Results   Component Value Date    EGFR 17 09/27/2024    EGFR 15 09/26/2024    EGFR 19 09/25/2024    CREATININE 2.60 (H) 09/27/2024    CREATININE 2.87 (H) 09/26/2024    CREATININE 2.35 (H) 09/25/2024     Creatinine baseline of about 1.7-1.9  Now " with concerns of AMANDA, avoid nephrotoxic agents  AMANDA (acute kidney injury) (HCC)  Baseline creatinine of 1.7-1.9  Creatinine of 2.87 today  Suspect due to perioperative hypotension, with ABLA  Renal ultrasound showing chronic medical disease  Renal functions improved, discussed with nephrology cleared for discharge  Repeat BMP in 1 week  ABLA (acute blood loss anemia)  Suspect likely postoperatively with right hip repair  No evidence of bleeding, monitor at this time  Repeat CBC in 1 week  Chronic kidney disease-mineral and bone disorder  Lab Results   Component Value Date    EGFR 17 09/27/2024    EGFR 15 09/26/2024    EGFR 19 09/25/2024    CREATININE 2.60 (H) 09/27/2024    CREATININE 2.87 (H) 09/26/2024    CREATININE 2.35 (H) 09/25/2024     Normal anion gap metabolic acidosis       Discharging Physician / Practitioner: Sumanth Tello MD  PCP: Deion Grimes MD  Admission Date:   Admission Orders (From admission, onward)       Ordered        09/23/24 2258  INPATIENT ADMISSION  Once                          Discharge Date: 09/27/24    Medical Problems       Resolved Problems  Date Reviewed: 9/23/2024            Resolved    Mixed hyperlipidemia 9/27/2024     Resolved by  Joselyn Reyes Bahamonde, MD          Consultations During Hospital Stay:  Orthopedics  Nephrology    Procedures Performed:   Right hip fracture repair    Significant Findings / Test Results:   US kidney and bladder    Result Date: 9/26/2024  Impression: 1.  The kidneys are mildly atrophic bilaterally, with increased parenchymal echogenicity and diffuse cortical thinning. This suggest chronic medical renal disease. Workstation performed: [id number]     XR femur 2 vw right    Result Date: 9/25/2024  Impression: Fluoroscopy provided for procedure guidance. Please refer to the separate procedure note for additional details. Workstation performed: [id number]     XR chest 1 view portable    Result Date: 9/24/2024  Impression: No acute cardiopulmonary  "disease. Workstation performed: [id number]     XR femur 2 views RIGHT    Result Date: 9/24/2024  Impression: Acute displaced angulated right femoral intertrochanteric fracture. Distal femur is intact. Clinical provider is aware of the findings. Computerized Assisted Algorithm (CAA) may have been used to analyze all applicable images. Workstation performed: [id number]     XR hip/pelv 2-3 vws right    Result Date: 9/24/2024  Impression: Acute displaced angulated right femoral intertrochanteric fracture. Clinical provider is aware of the findings. Computerized Assisted Algorithm (CAA) may have been used to analyze all applicable images. Workstation performed: [id number]        Incidental Findings:   none     Test Results Pending at Discharge (will require follow up):   none     Outpatient Tests Requested:  none    Complications:  none    Reason for Admission: Fall    Hospital Course:     La Garcia is a 74 y.o. female patient who originally presented to the hospital on 9/23/2024 due to fall with subsequent right femur fracture.  Patient underwent femur fracture repair with intramedullary nailing.  Cleared by orthopedics for discharge postoperatively.  Did have AMANDA, suspect likely perioperative hypotension and with some acute blood loss anemia from surgery.  Renal functions did improve, and nephrology cleared for discharge.  Recommend to repeat CBC and BMP in 1 week.  Orthopedics follow-up in 2 weeks, continue aspirin 325 twice daily for 35 days for DVT prophylaxis.    Please see above list of diagnoses and related plan for additional information.     Condition at Discharge: fair     Discharge Day Visit / Exam:     Subjective:    No events overnight.  No complaints at this time.  Pain is controlled.  Vitals: Blood Pressure: 131/58 (09/27/24 0731)  Pulse: 84 (09/27/24 0731)  Temperature: 99.3 °F (37.4 °C) (09/27/24 0731)  Temp Source: Oral (09/27/24 0731)  Respirations: 16 (09/27/24 0731)  Height: 5' 2\" (157.5 cm) " (09/24/24 0014)  Weight - Scale: 96 kg (211 lb 10.3 oz) (09/24/24 1953)  SpO2: 97 % (09/27/24 0731)  Exam:   Physical Exam  Vitals and nursing note reviewed.   Constitutional:       Appearance: Normal appearance.   HENT:      Head: Normocephalic.   Eyes:      Conjunctiva/sclera: Conjunctivae normal.   Cardiovascular:      Rate and Rhythm: Normal rate.   Pulmonary:      Effort: Pulmonary effort is normal. No respiratory distress.   Abdominal:      Palpations: Abdomen is soft.      Tenderness: There is no abdominal tenderness.   Musculoskeletal:         General: No swelling. Normal range of motion.   Skin:     General: Skin is warm.   Neurological:      General: No focal deficit present.      Mental Status: She is alert. Mental status is at baseline.         Discharge instructions/Information to patient and family:   See after visit summary for information provided to patient and family.      Provisions for Follow-Up Care:  See after visit summary for information related to follow-up care and any pertinent home health orders.      Disposition:     Acute Rehab at [facility]    Discharge Statement:  I spent 40 minutes discharging the patient. This time was spent on the day of discharge. I had direct contact with the patient on the day of discharge. Greater than 50% of the total time was spent examining patient, answering all patient questions, arranging and discussing plan of care with patient as well as directly providing post-discharge instructions.  Additional time then spent on discharge activities.    Discharge Medications:  See after visit summary for reconciled discharge medications provided to patient and family.      ** Please Note: This note has been constructed using a voice recognition system **

## 2024-09-27 NOTE — ASSESSMENT & PLAN NOTE
Current hemoglobin:7.7 from 10.8  Etiology secondary to blood loss  Treatment:  Transfuse for hemoglobin less than 7.0 per primary service    No indication of ARACELI at this time

## 2024-09-27 NOTE — ASSESSMENT & PLAN NOTE
Baseline creatinine of 1.7-1.9  Creatinine of 2.87 today  Suspect due to perioperative hypotension, with ABLA  Renal ultrasound showing chronic medical disease  Renal functions improved, discussed with nephrology cleared for discharge  Repeat BMP in 1 week

## 2024-09-27 NOTE — ASSESSMENT & PLAN NOTE
Lab Results   Component Value Date    EGFR 17 09/27/2024    EGFR 15 09/26/2024    EGFR 19 09/25/2024    CREATININE 2.60 (H) 09/27/2024    CREATININE 2.87 (H) 09/26/2024    CREATININE 2.35 (H) 09/25/2024     Creatinine baseline of about 1.7-1.9  Now with concerns of AMANDA, avoid nephrotoxic agents

## 2024-09-27 NOTE — PHYSICAL THERAPY NOTE
PHYSICAL THERAPY Treatment  DATE: 09/27/24  TIME: 7161-0151    NAME:  La Garcia  AGE:   74 y.o.  Mrn:   [id number]  Length Of Stay: 4    ADMIT DX:  Hip fracture (HCC) [S72.009A]  Weakness [R53.1]  Fall, initial encounter [W19.XXXA]    Past Medical History:   Diagnosis Date    Arthritis     Diabetes mellitus (HCC)     Hypertension     Mixed hyperlipidemia 09/23/2024    Renal disorder      Past Surgical History:   Procedure Laterality Date    APPENDECTOMY      CHOLECYSTECTOMY      HERNIA REPAIR      FL OPTX FEM SHFT FX W/INSJ IMED IMPLT W/WO SCREW Right 9/24/2024    Procedure: INSERTION NAIL IM FEMUR ANTEGRADE (TROCHANTERIC)- right;  Surgeon: John Peters DO;  Location:  MAIN OR;  Service: Orthopedics        09/27/24 1019   PT Last Visit   PT Visit Date 09/27/24   Note Type   Note Type Treatment   Pain Assessment   Pain Assessment Tool 0-10   Pain Score 7   Pain Location/Orientation Orientation: Right;Location: Hip   Hospital Pain Intervention(s) Repositioned;Ambulation/increased activity;Rest   Restrictions/Precautions   Weight Bearing Precautions Per Order Yes   RLE Weight Bearing Per Order WBAT   Other Precautions Fall Risk;Pain;WBS;Chair Alarm   General   Chart Reviewed Yes   Family/Caregiver Present Yes   Cognition   Overall Cognitive Status WFL   Arousal/Participation Alert   Orientation Level Oriented X4   Subjective   Subjective Pt pleasant and agreeable.  pt continues to complain of pain at her right hip, but expresses improvement with mobility and tolerance.  pt continues to report signficant fatigue when attempting to ambulate short distances   Transfers   Sit to Stand 3  Moderate assistance   Additional items Assist x 1;Armrests;Increased time required  (Pt performed 4x (recliner and BSC)   Stand to Sit 4  Minimal assistance   Additional items Assist x 1;Armrests;Increased time required;Impulsive;Verbal cues   Stand pivot 3  Moderate assistance   Additional items Assist x 1;Increased time  required;Verbal cues;Impulsive  (RW)   Ambulation/Elevation   Gait Assistance 3  Moderate assist   Additional items Assist x 1;Verbal cues;Tactile cues   Assistive Device Rolling walker   Distance 15'x2   Ambulation/Elevation Additional Comments Pt utilized walker and required several standing rest breaks due to fatigue and pain. Pt able to clear RLE to advance step forward, but has difficulty clearing LLE to advance.  Improvement noted with practice.Pt showing improved utilization of walker this session, but continues to require cues. significant assistance needed to manage balance and unweight pt due to pain and poor tolerance with RLE wbing.   Balance   Static Sitting Fair   Static Standing Fair -   Ambulatory Poor  (RW)   Endurance Deficit   Endurance Deficit Yes   Activity Tolerance   Activity Tolerance Patient limited by pain;Patient limited by fatigue   Exercises   Hip Flexion Sitting;15 reps;AAROM;Right;Left   Assessment   Prognosis Fair   Problem List Decreased strength;Decreased range of motion;Decreased endurance;Impaired balance;Decreased mobility;Impaired judgement;Decreased safety awareness;Obesity;Orthopedic restrictions;Pain   Assessment Pt exhibits physical deficits noted in problem list above.  Deficits listed contribute to functional limitations that are significant from the patient PLOF and include: difficulty with bed mobility, impaired standing balance, inability to perform safe transfers, tolerance for OOB functional activities, unsafe/inefficient ambulation, and fall risk    Additionally, patient is limited by restrictions/precautions/DME: RLE WBAT.    Additional considerations affecting pt's discharge planning: Insufficient or unavailable assistance at home, Progressive cognitive decline affecting safety awareness/insight, Inability to manage basic self care ADLs with the assistance that is currently available, and Inability to perform necessary transfers and/or mobility out of bed, with the  assistance that is currently available    During today's session, performed light repetitive AAROM activities of hips in all directions prior to attempting transfers.  With sit>stand transfer, pt showing better tolerance and able to coordinate this session.  Pt continues to need substantial assist to clear hips.  Pt progressing tolerance for RLE wbing, improved ability to manage gait, and able to tolerate great distance of ambulation.  Pt continues to require rehab facility placement following admission.     The -PAC Mobility Score was used to assist in determining pt safety w/ mobility/self care & appropriate d/c recommendations, see above for scores. Patient's clinical presentation is evolving due to significant acute change in functional independence, uncontrolled pain, increased supplemental O2 demands compared to baseline, recent surgery/procedure, ongoing medical management needs, and complicated social/support system.     From a PT/mobility standpoint given the above findings, DC recommendation is level: II (Moderate Rehab Resource Intensity)    Considering the patient's PLOF, co-morbidities, acute functional limitations, functional outcome measures, and/or goal to progress functional independence; this patient would continue to benefit from skilled Physical Therapy intervention in the acute care setting.   Barriers to Discharge Decreased caregiver support   Goals   STG Expiration Date 10/05/24   Short Term Goal #1 1: Pt will perform supine<>sit transfer on flat bed, mod I. 2: Pt will perform stand pivot transfer with RW, mod I. 3: Pt will ambulate 150' with reciprocal gait and appropriate pace using RW, mod I. 4: Pt will perform written HEP, mod I.   Plan   Treatment/Interventions Functional transfer training;LE strengthening/ROM;Therapeutic exercise;Endurance training;Patient/family training;Equipment eval/education;Bed mobility;Gait training   PT Frequency 3-5x/wk   Discharge Recommendation   Rehab  Resource Intensity Level, PT II (Moderate Resource Intensity)   AM-PAC Basic Mobility Inpatient   Turning in Flat Bed Without Bedrails 2   Lying on Back to Sitting on Edge of Flat Bed Without Bedrails 2   Moving Bed to Chair 2   Standing Up From Chair Using Arms 2   Walk in Room 2   Climb 3-5 Stairs With Railing 1   Basic Mobility Inpatient Raw Score 11   Basic Mobility Standardized Score 30.25   [facility] Highest Level Of Mobility   -HL Goal 4: Move to chair/commode   -HL Achieved 6: Walk 10 steps or more   Education   Education Provided Mobility training;Assistive device   Patient Demonstrates acceptance/verbal understanding;Demonstrates verbal understanding;Reinforcement needed   End of Consult   Patient Position at End of Consult Bedside chair;All needs within reach;Bed/Chair alarm activated     The patient's AM-PAC Basic Mobility Inpatient Short Form Raw Score is 11. A Raw score of less than 16 suggests the patient may benefit from discharge to post-acute rehabilitation services. Please also refer to the recommendation of the Physical Therapist for safe discharge planning.    Dustin Amaya, PT, DPT  [place] Licensure #[id number]

## 2024-09-27 NOTE — ASSESSMENT & PLAN NOTE
Volume:  slightly hypervolemic on exam with trace lower extremity edema  Blood pressure: Normotensive, /58mmhg   Treatment:  Recommend low-sodium diet  Continue Lidopin 5 mg daily

## 2024-09-27 NOTE — ASSESSMENT & PLAN NOTE
AMANDA stage I on CKD G4 with evidence of kidney recovery  Etiology: Likely secondary to hemodynamic changes in the settings of blood loss  Baseline creatinine: 1.7 to 1.9 mg/dL  Current creatinine: 2.6 mg/dL, trending down  Peak creatinine: 2.87 mg/dL  UA: Leukocyturia with no bacteriuria  Treatment:  No urgent indication of dialysis at this time  Maintain MAP:  Over 65 mmHg if possible/avoid hypoperfusion:  Hold parameters on blood pressure medications  Avoid nephrotoxic agents such as NSAIDs, and IV contrast if possible. Avoid opioids   Adjust medications to GFR  Patient is a stable for discharge from my end can follow-up with nephrology

## 2024-09-27 NOTE — ASSESSMENT & PLAN NOTE
Lab Results   Component Value Date    EGFR 17 09/27/2024    EGFR 15 09/26/2024    EGFR 19 09/25/2024    CREATININE 2.60 (H) 09/27/2024    CREATININE 2.87 (H) 09/26/2024    CREATININE 2.35 (H) 09/25/2024   Calcium 9.3 mg/dL  No indication of phosphorus binders at this time   PTH as an outpatient

## 2024-09-27 NOTE — ASSESSMENT & PLAN NOTE
"/58 (BP Location: Left arm)   Pulse 84   Temp 99.3 °F (37.4 °C) (Oral)   Resp 16   Ht 5' 2\" (1.575 m)   Wt 96 kg (211 lb 10.3 oz)   SpO2 97%   BMI 38.71 kg/m²     Blood pressure elevated at the time of arrival, secondary to pain  Continue amlodipine 5 mg daily with BP hold parameters  "

## 2024-09-27 NOTE — ASSESSMENT & PLAN NOTE
Lab Results   Component Value Date    HGBA1C 6.8 (H) 09/23/2024       Recent Labs     09/26/24  0720 09/26/24  1050 09/26/24  1620 09/27/24  0700   POCGLU 145* 221* 204* 122       Blood Sugar Average: Last 72 hrs:  (P) [id number]    HbA1c 6.8  Advised to maintain a good DM control to prevent progression of CKD   Maintain healthy diet (vegetables, fruits, whole grains, nonfat or low fat)  Weight loss  Physical activity (5 to 10 minutes to start the increase to 30 min a day)

## 2024-09-27 NOTE — PROGRESS NOTES
[facility]  [address]  [place] [address]  [id number]    Nursing Home Admission    NAME: La Garcia  AGE: 74 y.o. SEX: female [id number]    DATE OF ENCOUNTER: 9/27/2024    Assessment/Plan:   Patient’s care was coordinated with nursing facility staff. Recent vitals, labs and updated medications were reviewed on [facility] system of facility. Past Medical, surgical, social, medication and allergy history and patient’s previous records reviewed.     1. Closed fracture of neck of right femur with routine healing, subsequent encounter        2. Type 2 diabetes mellitus without complication, without long-term current use of insulin ([facility])        3. Normal anion gap metabolic acidosis        4. Stage 4 chronic kidney disease (HCC)        5. Advanced directives, counseling/discussion        6. Ambulatory dysfunction             Fracture of neck of right femur (HCC)  S/p fall  Pt with Rt femoral intertrochanteric fracture  9/24/2024 - pt s/p Rt femur IM nail insertion  DVT prophylaxis: aspirin 325 mg 1 tab po BID x 35 days  Pain: oxycodone 5 mg 1 tab po q 6  hours prn severe pain  Please schedule 2 week f/u with [facility] ortho    Type 2 diabetes mellitus, without long-term current use of insulin ([facility])  Goal hga1c in geriatric population is 7.5 to avoid hypoglycemia  Lab Results   Component Value Date    HGBA1C 6.8 (H) 09/23/2024   Sugars 122-298 as inpt  Pt was on lantus 10 units SQ as inpatient  Dc glimepiride as pt with CKD with Cr 2.6 & increased risk of hypoglycemia  Pt restarted on home pioglitazone 30 mg 1 tab po daily  Monitor sugar trend    Normal anion gap metabolic acidosis  9/16/2024 bicarb 19  Cont sodium bicarb 650 mg 1 tab po daily  Check periodic bmp to monitor bicarb & cr    Stage 4 chronic kidney disease (HCC)  Lab Results   Component Value Date    EGFR 17 09/27/2024    EGFR 15 09/26/2024    EGFR 19 09/25/2024    CREATININE 2.60 (H) 09/27/2024     CREATININE 2.87 (H) 09/26/2024    CREATININE 2.35 (H) 09/25/2024   Baseline Cr 1.7-1.9  Cr improved to 2.6 from 2.8  Encourage good po intake of fluids  Avoid nsaids/nephrotoxins/IV contrast  Check periodic bmp    Advanced directives, counseling/discussion  D/w both pt & son - pt full code  Rn witnessed    Ambulatory dysfunction  PT/OT to eval/tx & help with balance/strength training  Encourage good po intake of solids/liquids  OOB as tolerated (with assistance from staff)  Adjust environment to reduce risk of falls   (Clear restroom pathway, provide adequate lighting, and place call bell in pt's hands)  Encourage pt to stay motivated with physical/occupational therapy  Early/Frequent mobilization with assistance from staff/therapy  Fall precaution    Chief Complaint      Here for STR    HPI   Patient is a 74 y.o. female with PMH HTN, CKD4, DMII and hyperlipidemia.    Pt admitted at [facility] from 9/24/2024-9/27/2024 after sustaining a fall and fracturing her Rt hip. Pt underwent Rt IM intertrochanteric fracture repair on 9/24/2024. Pt tolerated the procedure well. Pt then discharged to [facility] on 9/27/2024 for further rehab.    Pt seen and examined. Nurse at bedside. Pt's Rt hip dressings c/d/I. Pt with positive pedal pulses. Pt full code. Son came to bedside and confirmed. Pt knows her medications. She gets her meds at [facility]. Pt lives alone. Her son lives in [place], [place]. Pt medically stable.       Past Medical History:   Diagnosis Date    Arthritis     Diabetes mellitus (HCC)     Hypertension     Mixed hyperlipidemia 09/23/2024    Renal disorder        Past Surgical History:   Procedure Laterality Date    APPENDECTOMY      CHOLECYSTECTOMY      HERNIA REPAIR      ME OPTX FEM SHFT FX W/INSJ IMED IMPLT W/WO SCREW Right 9/24/2024    Procedure: INSERTION NAIL IM FEMUR ANTEGRADE (TROCHANTERIC)- right;  Surgeon: John Peters DO;  Location:  MAIN OR;  Service: Orthopedics       Social History      Tobacco Use   Smoking Status Never   Smokeless Tobacco Never        History reviewed. No pertinent family history.     No Known Allergies       Current Outpatient Medications:     amLODIPine (NORVASC) 5 mg tablet, Take 1 tablet by mouth daily, Disp: , Rfl:     aspirin (ECOTRIN LOW STRENGTH) 81 mg EC tablet, Take 1 tablet (81 mg total) by mouth daily Can resume after completing 35 days of Aspirin 325mg BID on 11/02/24, Disp: , Rfl:     aspirin 325 mg tablet, Take 1 tablet (325 mg total) by mouth 2 (two) times a day with meals, Disp: , Rfl:     Cholecalciferol (Vitamin D3) 50 MCG (2000 UT) capsule, Take 2,000 Units by mouth daily, Disp: , Rfl:     glimepiride (AMARYL) 2 mg tablet, Take 2 mg by mouth 2 (two) times a day, Disp: , Rfl:     oxyCODONE (ROXICODONE) 5 immediate release tablet, Take 1 tablet (5 mg total) by mouth every 6 (six) hours as needed for moderate pain or severe pain Max Daily Amount: 20 mg, Disp: 10 tablet, Rfl: 0    pioglitazone (ACTOS) 30 mg tablet, Take 30 mg by mouth daily, Disp: , Rfl:     sodium bicarbonate 650 mg tablet, Take 1 tablet (650 mg total) by mouth in the morning, Disp: 30 tablet, Rfl: 0    [facility]     Vital signs were reviewed in [facility] care    A 12-point comprehensive review of symptoms was obtained.  Pertinent positives and negatives noted in HPI.   Review of Systems   Musculoskeletal:  Positive for arthralgias.   All other systems reviewed and are negative.    Physical Exam  Constitutional:       General: She is not in acute distress.     Appearance: She is not ill-appearing.   Cardiovascular:      Rate and Rhythm: Regular rhythm.      Heart sounds: Normal heart sounds. No murmur heard.  Pulmonary:      Effort: No respiratory distress.      Breath sounds: Normal breath sounds. No wheezing.   Abdominal:      General: Bowel sounds are normal. There is no distension.      Palpations: Abdomen is soft.      Tenderness:  There is no abdominal tenderness.   Musculoskeletal:      Right lower leg: No edema.      Left lower leg: No edema.   Skin:     Comments: 2 dressings c/d/I; no drainage   Neurological:      Mental Status: She is alert and oriented to person, place, and time.   Psychiatric:         Mood and Affect: Mood normal.         Behavior: Behavior normal.       Diagnostic Data   Recent labs and imaging studies were reviewed.   Code Status:    Full (d/w pt & son)    Additional notes:    Gave orders for patient's medications and nursing home admission orders.     This note was electronically signed by Dr. Lis Delarosa  [facility]

## 2024-09-27 NOTE — ASSESSMENT & PLAN NOTE
Resulted after a mechanical fall on right thigh  X-rays showing right femoral intertrochanteric fracture  S/p right hip repair on 09/24  PT and OT evaluated recommending rehab  Ortho cleared for discharge, recommend aspirin 325 mg twice a day on discharge.  Will need follow-up in 2 weeks in office with Ortho  Discharged to [facility] for short-term rehab

## 2024-09-27 NOTE — ASSESSMENT & PLAN NOTE
#Acid-base Disorder  serum HCO3 20mmol/L  Metabolic acidosis secondary to AMANDA  Start sodium bicarbonate once a day

## 2024-09-27 NOTE — ASSESSMENT & PLAN NOTE
Lab Results   Component Value Date    HGBA1C 6.8 (H) 09/23/2024       Recent Labs     09/26/24  1050 09/26/24  1620 09/27/24  0700 09/27/24  1119   POCGLU 221* 204* 122 298*       Blood Sugar Average: Last 72 hrs:  (P) 197.0740518515161269  Home regimen consist of glimepiride and Actos  Hold home oral antidiabetic agents  Continue Lantus 10 units once daily while inpatient

## 2024-09-27 NOTE — ASSESSMENT & PLAN NOTE
Lab Results   Component Value Date    EGFR 17 09/27/2024    EGFR 15 09/26/2024    EGFR 19 09/25/2024    CREATININE 2.60 (H) 09/27/2024    CREATININE 2.87 (H) 09/26/2024    CREATININE 2.35 (H) 09/25/2024   Baseline creatinine: 1.7 to 1.9 mg/dL  Etiology: Likely secondary to nephrosclerosis in the settings of hypertension  Follow-up with nephrology on discharge

## 2024-09-27 NOTE — ASSESSMENT & PLAN NOTE
Suspect likely postoperatively with right hip repair  No evidence of bleeding, monitor at this time  Repeat CBC in 1 week

## 2024-09-27 NOTE — PROGRESS NOTES
Progress Note - Orthopedics   La Garcia 74 y.o. female MRN: [id number]  Unit/Bed#: -01      Subjective:    74 y.o.female POD #3 R femoral IMN. No acute events. Pt doing well. Denies other consitutional complaints.    Labs:  0   Lab Value Date/Time    HCT 24.6 (L) 09/27/2024 0502    HCT 24.3 (L) 09/26/2024 0452    HCT 30.9 (L) 09/25/2024 0433    HCT 34.0 (L) 11/22/2022 0815    HGB 7.7 (L) 09/27/2024 0502    HGB 7.6 (L) 09/26/2024 0452    HGB 9.4 (L) 09/25/2024 0433    HGB 11.2 (L) 11/22/2022 0815    INR 1.15 09/23/2024 2255    WBC 7.92 09/27/2024 0502    WBC 9.00 09/26/2024 0452    WBC 9.17 09/25/2024 0433       Meds:    Current Facility-Administered Medications:     acetaminophen (TYLENOL) tablet 650 mg, 650 mg, Oral, Q6H PRN, Denise Cho MD    amLODIPine (NORVASC) tablet 5 mg, 5 mg, Oral, Daily, Samanta Gomez PA-C    Cholecalciferol (VITAMIN D3) tablet 1,000 Units, 1,000 Units, Oral, Daily, Denise Cho MD, 1,000 Units at 09/26/24 0826    heparin (porcine) subcutaneous injection 5,000 Units, 5,000 Units, Subcutaneous, Q8H MORIS, Denise Cho MD, 5,000 Units at 09/26/24 2339    HYDROmorphone HCl (DILAUDID) injection 0.2 mg, 0.2 mg, Intravenous, Q3H PRN, Denise Cho MD, 0.2 mg at 09/24/24 1525    insulin glargine (LANTUS) subcutaneous injection 10 Units 0.1 mL, 10 Units, Subcutaneous, QAM, Sumanth Tello MD, 10 Units at 09/26/24 0826    insulin lispro (HumALOG/ADMELOG) 100 units/mL subcutaneous injection 1-5 Units, 1-5 Units, Subcutaneous, HS, Denise Cho MD, 1 Units at 09/26/24 2136    insulin lispro (HumALOG/ADMELOG) 100 units/mL subcutaneous injection 1-5 Units, 1-5 Units, Subcutaneous, TID AC, 1 Units at 09/26/24 1740 **AND** Fingerstick Glucose (POCT), , , TID AC, Denise Cho MD    ondansetron (ZOFRAN) injection 4 mg, 4 mg, Intravenous, Q6H PRN, Denise Cho MD, 4 mg at 09/24/24 0015    oxyCODONE (ROXICODONE) IR tablet 5 mg, 5 mg, Oral, Q4H PRN, Denise Cho MD, 5 mg at 09/26/24 1739    senna-docusate  "sodium (SENOKOT S) 8.6-50 mg per tablet 1 tablet, 1 tablet, Oral, BID, Sumanth Tello MD, 1 tablet at 09/26/24 3489    Blood Culture:   No results found for: \"BLOODCX\"    Wound Culture:   No results found for: \"WOUNDCULT\"    Ins and Outs:  I/O last 24 hours:  In: 850 [P.O.:850]  Out: 690 [Urine:690]          Physical:  Vitals:    09/27/24 0731   BP: 131/58   Pulse: 84   Resp: 16   Temp: 99.3 °F (37.4 °C)   SpO2: 97%     Musculoskeletal: right Lower Extremity    Skin - no erythema around dressings  Dressing c/d/i  Calf compressible, nontender  SILT s/sp/dp/t  +FHL/EHL, +ankle dorsi/plantar flexion.    Toes warm and well perfused    _*_*_*_*_*_*_*_*_*_*_*_*_*_*_*_*_*_*_*_*_*_*_*_*_*_*_*_*_*_*_*_*_*_*_*_*_*_*_*_*_*    Assessment:    74 y.o.female POD #3 R femoral IMN    Plan:  WBAT RLE  PT/OT  Pain control  DVT ppx - currently on heparin - recommend aspirin 325 mg BID x 35 days on discharge  Dispo: F/u Dr. Peters 2 weeks from date of surgery     Carol Perez PA-C      "

## 2024-09-29 PROBLEM — Z71.89 ADVANCED DIRECTIVES, COUNSELING/DISCUSSION: Status: ACTIVE | Noted: 2024-09-29

## 2024-09-29 PROBLEM — R26.2 AMBULATORY DYSFUNCTION: Status: ACTIVE | Noted: 2024-09-29

## 2024-09-29 NOTE — ASSESSMENT & PLAN NOTE
Goal hga1c in geriatric population is 7.5 to avoid hypoglycemia  Lab Results   Component Value Date    HGBA1C 6.8 (H) 09/23/2024   Sugars 122-298 as inpt  Pt was on lantus 10 units SQ as inpatient  Dc glimepiride as pt with CKD with Cr 2.6 & increased risk of hypoglycemia  Pt restarted on home pioglitazone 30 mg 1 tab po daily  Monitor sugar trend

## 2024-09-29 NOTE — ASSESSMENT & PLAN NOTE
S/p fall  Pt with Rt femoral intertrochanteric fracture  9/24/2024 - pt s/p Rt femur IM nail insertion  DVT prophylaxis: aspirin 325 mg 1 tab po BID x 35 days  Pain: oxycodone 5 mg 1 tab po q 6  hours prn severe pain  Please schedule 2 week f/u with [facility] ortho

## 2024-09-29 NOTE — ASSESSMENT & PLAN NOTE
Lab Results   Component Value Date    EGFR 17 09/27/2024    EGFR 15 09/26/2024    EGFR 19 09/25/2024    CREATININE 2.60 (H) 09/27/2024    CREATININE 2.87 (H) 09/26/2024    CREATININE 2.35 (H) 09/25/2024   Baseline Cr 1.7-1.9  Cr improved to 2.6 from 2.8  Encourage good po intake of fluids  Avoid nsaids/nephrotoxins/IV contrast  Check periodic bmp

## 2024-09-29 NOTE — ASSESSMENT & PLAN NOTE
PT/OT to eval/tx & help with balance/strength training  Encourage good po intake of solids/liquids  OOB as tolerated (with assistance from staff)  Adjust environment to reduce risk of falls   (Clear restroom pathway, provide adequate lighting, and place call bell in pt's hands)  Encourage pt to stay motivated with physical/occupational therapy  Early/Frequent mobilization with assistance from staff/therapy  Fall precaution

## 2024-09-29 NOTE — ASSESSMENT & PLAN NOTE
9/16/2024 bicarb 19  Cont sodium bicarb 650 mg 1 tab po daily  Check periodic bmp to monitor bicarb & cr

## 2024-09-30 ENCOUNTER — TELEPHONE (OUTPATIENT)
Dept: NEPHROLOGY | Facility: CLINIC | Age: 74
End: 2024-09-30

## 2024-09-30 DIAGNOSIS — N17.9 ACUTE KIDNEY INJURY (HCC): Primary | ICD-10-CM

## 2024-09-30 NOTE — TELEPHONE ENCOUNTER
LM for  at [facility] to set pt up for hosp f/u with any provider. [place] office is closest to [facility].

## 2024-09-30 NOTE — TELEPHONE ENCOUNTER
Talked with [facility].  BMP order faxed to the facility to be done in one week per  Dr. Duarte.  (Fax [phone])    ----- Message from Isiah Duarte MD sent at 9/30/2024  8:57 AM EDT -----  This patient was seen at [facility] by Dr. Reyes for AMANDA and was discharged to a SNF  Please arrange for follow up in the office in 4-8 weeks.   Labs: Please call the SNF and have them do a BMP in 1 week.

## 2024-10-01 ENCOUNTER — NURSING HOME VISIT (OUTPATIENT)
Dept: GERIATRICS | Facility: OTHER | Age: 74
End: 2024-10-01
Payer: COMMERCIAL

## 2024-10-01 DIAGNOSIS — S72.001D CLOSED FRACTURE OF NECK OF RIGHT FEMUR WITH ROUTINE HEALING, SUBSEQUENT ENCOUNTER: Primary | ICD-10-CM

## 2024-10-01 DIAGNOSIS — E11.9 TYPE 2 DIABETES MELLITUS WITHOUT COMPLICATION, WITHOUT LONG-TERM CURRENT USE OF INSULIN (HCC): ICD-10-CM

## 2024-10-01 PROCEDURE — 99308 SBSQ NF CARE LOW MDM 20: CPT | Performed by: INTERNAL MEDICINE

## 2024-10-01 NOTE — ASSESSMENT & PLAN NOTE
Goal Hga1c in geriatric population is >7.5 to avoid hypoglycemia  Lab Results   Component Value Date    HGBA1C 6.8 (H) 09/23/2024     254.0 mg/dL 10/1/2024 09:05     9/30/2024 21:12 264.0 mg/dL     9/30/2024 17:06 310.0 mg/dL     9/30/2024 11:18 263.0 mg/dL     9/30/2024 07:06 162.0 mg/dL     9/29/2024 20:31 330.0 mg/dL     9/29/2024 16:52 274.0 mg/dL     9/29/2024 11:10 283.0 mg/dL     9/29/2024 07:09 179.0 mg/dL     9/28/2024 20:02 274.0 mg/dL     9/28/2024 16:32 202.0 mg/dL     9/28/2024 11:15 228.0 mg/dL     9/28/2024 07:39 144.0 mg/dL     9/27/2024 21:22 229.0 mg/dL     9/27/2024 17:17 230.0 mg/dL     Pt prefers oral hypoglycemics  Explained to pt with son at bedside she was CKD  No glimepiride in pts with ckd; thus discontinued  Pt received actos today  Pt was on lantus 10 units SQ as inpatient  Pt agreeable to starting it here at [facility]  Added lispro 3 units qac for sugar >250  Restart lantus & titrate up (pt had actos today)  DM education for pt

## 2024-10-01 NOTE — ASSESSMENT & PLAN NOTE
Pt s/p fall  Pt with Rt femoral intertrochanteric fracture  9/24/2024 - s/p Rt femur IM nail insertion  Rt hip dressing intact; no signs of infection around dressing; no hematoma  DVT prophylaxis: aspirin 325 mg 1 tab po BID x 35 days  Pain: Oxycodone 5 mg 1 tab po q6 hours prn severe pain  Pt has upcoming ortho appt on 10/7/2024

## 2024-10-01 NOTE — PROGRESS NOTES
[facility]  [address] [place] [address] [phone]  [facility]  POS [address]      NAME: La Garcia  AGE: 74 y.o. SEX: female [id number]    DATE OF ENCOUNTER: 10-    Assessment and Plan     1. Closed fracture of neck of right femur with routine healing, subsequent encounter        2. Type 2 diabetes mellitus without complication, without long-term current use of insulin (HCC)           Fracture of neck of right femur (HCC)  Pt s/p fall  Pt with Rt femoral intertrochanteric fracture  9/24/2024 - s/p Rt femur IM nail insertion  Rt hip dressing intact; no signs of infection around dressing; no hematoma  DVT prophylaxis: aspirin 325 mg 1 tab po BID x 35 days  Pain: Oxycodone 5 mg 1 tab po q6 hours prn severe pain  Pt has upcoming ortho appt on 10/7/2024    Type 2 diabetes mellitus, without long-term current use of insulin (HCC)  Goal Hga1c in geriatric population is >7.5 to avoid hypoglycemia  Lab Results   Component Value Date    HGBA1C 6.8 (H) 09/23/2024     254.0 mg/dL 10/1/2024 09:05     9/30/2024 21:12 264.0 mg/dL     9/30/2024 17:06 310.0 mg/dL     9/30/2024 11:18 263.0 mg/dL     9/30/2024 07:06 162.0 mg/dL     9/29/2024 20:31 330.0 mg/dL     9/29/2024 16:52 274.0 mg/dL     9/29/2024 11:10 283.0 mg/dL     9/29/2024 07:09 179.0 mg/dL     9/28/2024 20:02 274.0 mg/dL     9/28/2024 16:32 202.0 mg/dL     9/28/2024 11:15 228.0 mg/dL     9/28/2024 07:39 144.0 mg/dL     9/27/2024 21:22 229.0 mg/dL     9/27/2024 17:17 230.0 mg/dL     Pt prefers oral hypoglycemics  Explained to pt with son at bedside she was CKD  No glimepiride in pts with ckd; thus discontinued  Pt received actos today  Pt was on lantus 10 units SQ as inpatient  Pt agreeable to starting it here at [facility]  Added lispro 3 units qac for sugar >250  Restart lantus & titrate up (pt had actos today)  DM education for pt       Code status: full    Chief Complaint     [facility] follow-up visit.    History of Present Illness    Pt seen and examined. Son at bedside and RN too. Pt grows tearful when I explain why it is important for her to be on lantus. Pt agreeable. Pt had actos today. Pt with CKD so discontinued amaryl. Family stepped out - Rt hip dressings intact.    The following portions of the patient's history were reviewed and updated as appropriate: allergies, current medications, past family history, past medical history, past social history, past surgical history and problem list.    Review of Systems     Review of Systems   All other systems reviewed and are negative.    A comprehensive review of symptoms was obtained.  Pertinent positives and negatives noted in HPI.     Objective     Vitals: Reviewed in [employer] system. VSS    General: Awake, alert and oriented  Head: atraumatic, normocephalic  Cardiovascular: RRR  Lungs: Clear to auscultation bilaterally  Abdomen: nontender/nondistended, +BS  Legs: no cyanosis, clubbing or edema  RT hip dressings intact; no signs of infection around dressing; no hematoma  Skin: clean, dry  Psych: calm, cooperative    Pertinent Laboratory/Diagnostic Studies:  Refer to facility chart.    Current Medications   Medications reviewed and updated in facility chart.    Lis Delarosa MD  Internal Medicine  Senior Care Physician

## 2024-10-03 ENCOUNTER — NURSING HOME VISIT (OUTPATIENT)
Dept: GERIATRICS | Facility: OTHER | Age: 74
End: 2024-10-03
Payer: COMMERCIAL

## 2024-10-03 DIAGNOSIS — E11.9 TYPE 2 DIABETES MELLITUS WITHOUT COMPLICATION, WITHOUT LONG-TERM CURRENT USE OF INSULIN (HCC): Primary | ICD-10-CM

## 2024-10-03 PROCEDURE — 99307 SBSQ NF CARE SF MDM 10: CPT | Performed by: INTERNAL MEDICINE

## 2024-10-03 NOTE — ASSESSMENT & PLAN NOTE
Goal hga1c in geriatric population is >7.5 to avoid hypoglycemia  Lab Results   Component Value Date    HGBA1C 6.8 (H) 09/23/2024     305.0 mg/dL 10/3/2024 11:58     10/3/2024 07:16 161.0 mg/dL     10/2/2024 21:42 280.0 mg/dL     10/2/2024 17:01 341.0 mg/dL     10/2/2024 12:19 300.0 mg/dL     10/2/2024 07:36 148.0 mg/dL     Change lispro to 5 units qac for sugar >250  Increase lantus to 10 units SQ QHS  Ask nursing staff to train pt how to give herself insulin

## 2024-10-03 NOTE — PROGRESS NOTES
"[facility]  [address] [place] [address]  [phone]  [facility]  [address]      NAME: La Garcia  AGE: 74 y.o. SEX: female [id number]    DATE OF ENCOUNTER: 10/3/2024    Assessment and Plan     1. Type 2 diabetes mellitus without complication, without long-term current use of insulin (HCC)           Type 2 diabetes mellitus, without long-term current use of insulin (HCC)  Goal hga1c in geriatric population is >7.5 to avoid hypoglycemia  Lab Results   Component Value Date    HGBA1C 6.8 (H) 09/23/2024     305.0 mg/dL 10/3/2024 11:58     10/3/2024 07:16 161.0 mg/dL     10/2/2024 21:42 280.0 mg/dL     10/2/2024 17:01 341.0 mg/dL     10/2/2024 12:19 300.0 mg/dL     10/2/2024 07:36 148.0 mg/dL     Change lispro to 5 units qac for sugar >250  Increase lantus to 10 units SQ QHS  Ask nursing staff to train pt how to give herself insulin       Code status: full    Chief Complaint     [facility] follow-up visit.    History of Present Illness   Pt seen and examined. Pt would like her phone, etc closer to her. Pt likes the lidocaine patch to help her knees. Discussed going up on lantus to 10 units SQ QHS.    Per PCC:  \"  10/2/2024 20:18 Health Status Note   Note Text: Resident alert and responsive, able to make needs known. Resident requires one-person extensive assist with Adl's, two for transfer. Appetite good, feed self. VSS meds given as order, denies any pain. Resident right hip dressing dries no s/s infection noted, blood sugar check as order no s/s hyper / hypoglycemia. Call light within reach safety being maintained.       The following portions of the patient's history were reviewed and updated as appropriate: allergies, current medications, past family history, past medical history, past social history, past surgical history and problem list.    Review of Systems     Review of Systems   All other systems reviewed and are negative.    A comprehensive review of symptoms was obtained.  " Pertinent positives and negatives noted in HPI.     Objective     Vitals: Reviewed in [employer] system. VSS    General: Awake, alert and oriented  Head: atraumatic, normocephalic  Cardiovascular: RRR  Lungs: Clear to auscultation bilaterally  Abdomen: nontender/nondistended, +BS  Legs: no cyanosis, clubbing or edema  Feet: 2+ b/l pedal pulses; Rt hip dressing c/d/i  Skin: clean, dry  Psych: calm, cooperative    Pertinent Laboratory/Diagnostic Studies:  Refer to facility chart.    Current Medications   Medications reviewed and updated in facility chart.    Lis Delarosa MD  Internal Medicine  Senior Care Physician

## 2024-10-04 ENCOUNTER — DOCUMENTATION (OUTPATIENT)
Dept: GERIATRICS | Facility: OTHER | Age: 74
End: 2024-10-04

## 2024-10-04 NOTE — PROGRESS NOTES
"I documented in PCC:  \"  10/4/2024 09:42 Physician's Note   Note Text: Pt had high K yesterday. Pt given kayexlate. Plan to f/u labs today. Pt with CKD. Met with pt & son & answered all questions. Sugars slightly high. Increasing lantus to 13 units SQ QHS. Schedule tylenol. Dc oxycodone as pt gets constipated. Dc prn tylenol. Schedule 1000 mg of tylenol TID. Methacarbomol 500 mg po q6. I also removed Rt hip dressings as per AVS instructions. Incision sites healing well. No signs of infection. Wound is closed/healed. Staples intact. Follow wound care instructions of cleanse with normal saline & DSD daily.     Per PCC  Wound-Surgical: Check placement of dressing to (right hip) every shift every shift for dressing placement check Other Active          Wound-Surgical: Cleanse right hip with NSS, cover with DSD daily every day shift      "

## 2024-10-07 ENCOUNTER — TELEPHONE (OUTPATIENT)
Age: 74
End: 2024-10-07

## 2024-10-07 ENCOUNTER — OFFICE VISIT (OUTPATIENT)
Dept: OBGYN CLINIC | Facility: CLINIC | Age: 74
End: 2024-10-07

## 2024-10-07 ENCOUNTER — HOSPITAL ENCOUNTER (OUTPATIENT)
Dept: RADIOLOGY | Facility: HOSPITAL | Age: 74
Discharge: HOME | End: 2024-10-07
Attending: ORTHOPAEDIC SURGERY
Payer: COMMERCIAL

## 2024-10-07 VITALS
HEIGHT: 62 IN | HEART RATE: 70 BPM | DIASTOLIC BLOOD PRESSURE: 76 MMHG | WEIGHT: 211 LBS | SYSTOLIC BLOOD PRESSURE: 132 MMHG | BODY MASS INDEX: 38.83 KG/M2

## 2024-10-07 DIAGNOSIS — Z98.890 STATUS POST SURGERY: ICD-10-CM

## 2024-10-07 DIAGNOSIS — S72.001D CLOSED FRACTURE OF NECK OF RIGHT FEMUR WITH ROUTINE HEALING, SUBSEQUENT ENCOUNTER: Primary | ICD-10-CM

## 2024-10-07 PROCEDURE — 73502 X-RAY EXAM HIP UNI 2-3 VIEWS: CPT

## 2024-10-07 PROCEDURE — 99024 POSTOP FOLLOW-UP VISIT: CPT | Performed by: ORTHOPAEDIC SURGERY

## 2024-10-07 NOTE — TELEPHONE ENCOUNTER
Caller: sydnee Alonzo    Doctor: lucy    Reason for call: patient is at a rehab facility for PT. Patient was seen today for  Post op. Nurse at facility states there are about 12 -17 staples/ or stiches still left behind. Son was not at appointment as he is over an hour away. Son thought there were to be removed today.   Please advise SYDNEE   Thank you    Call back#: [phone]

## 2024-10-07 NOTE — PROGRESS NOTES
[facility] [address] [id number] [phone]       La M Jose  [id number]  1950    ORTHOPAEDIC SURGERY OUTPATIENT NOTE  10/7/2024      HISTORY:  74 y.o. female presents for postop visit status post right short trochanteric nail fixation for intertrochanteric fracture performed on 9/24/2024.  Reports soreness over the lateral hip at the incisions.  Otherwise she is doing well.  No groin pain    Past Medical History:   Diagnosis Date    Arthritis     Diabetes mellitus (HCC)     Hypertension     Mixed hyperlipidemia 09/23/2024    Renal disorder        Past Surgical History:   Procedure Laterality Date    APPENDECTOMY      CHOLECYSTECTOMY      HERNIA REPAIR      OH OPTX FEM SHFT FX W/INSJ IMED IMPLT W/WO SCREW Right 9/24/2024    Procedure: INSERTION NAIL IM FEMUR ANTEGRADE (TROCHANTERIC)- right;  Surgeon: John Peters DO;  Location:  MAIN OR;  Service: Orthopedics       Social History     Socioeconomic History    Marital status:      Spouse name: Not on file    Number of children: Not on file    Years of education: Not on file    Highest education level: Not on file   Occupational History    Not on file   Tobacco Use    Smoking status: Never    Smokeless tobacco: Never   Vaping Use    Vaping status: Never Used   Substance and Sexual Activity    Alcohol use: Not Currently    Drug use: Not Currently    Sexual activity: Not on file   Other Topics Concern    Not on file   Social History Narrative    Not on file     Social Determinants of Health     Financial Resource Strain: High Risk (6/12/2024)    Received from [facility]    Overall Financial Resource Strain (CARDIA)     Difficulty of Paying Living Expenses: Hard   Food Insecurity: No Food Insecurity (9/24/2024)    Hunger Vital Sign     Worried About Running Out of Food in the Last Year: Never true     Ran Out of Food in the Last Year: Never  true   Transportation Needs: No Transportation Needs (9/24/2024)    PRAPARE - Transportation     Lack of Transportation (Medical): No     Lack of Transportation (Non-Medical): No   Physical Activity: Not on file   Stress: Not on file   Social Connections: Feeling Somewhat Isolated (6/12/2024)    Received from [facility]    OASIS : Social Isolation     How often do you feel lonely or isolated from those around you?: Sometimes   Intimate Partner Violence: Not At Risk (6/12/2024)    Received from [facility]    Humiliation, Afraid, Rape, and Kick questionnaire     Fear of Current or Ex-Partner: No     Emotionally Abused: No     Physically Abused: No     Sexually Abused: No   Housing Stability: Low Risk  (9/24/2024)    Housing Stability Vital Sign     Unable to Pay for Housing in the Last Year: No     Number of Times Moved in the Last Year: 0     Homeless in the Last Year: No       History reviewed. No pertinent family history.     Patient's Medications   New Prescriptions    No medications on file   Previous Medications    AMLODIPINE (NORVASC) 5 MG TABLET    Take 1 tablet by mouth daily    ASPIRIN (ECOTRIN LOW STRENGTH) 81 MG EC TABLET    Take 1 tablet (81 mg total) by mouth daily Can resume after completing 35 days of Aspirin 325mg BID on 11/02/24    ASPIRIN 325 MG TABLET    Take 1 tablet (325 mg total) by mouth 2 (two) times a day with meals    CHOLECALCIFEROL (VITAMIN D3) 50 MCG (2000 UT) CAPSULE    Take 2,000 Units by mouth daily    OXYCODONE (ROXICODONE) 5 IMMEDIATE RELEASE TABLET    Take 1 tablet (5 mg total) by mouth every 6 (six) hours as needed for moderate pain or severe pain Max Daily Amount: 20 mg    PIOGLITAZONE (ACTOS) 30 MG TABLET    Take 30 mg by mouth daily    SODIUM BICARBONATE 650 MG TABLET    Take 1 tablet (650 mg total) by mouth in the morning   Modified Medications    No medications on file   Discontinued Medications    No medications on file       No Known  Allergies     There were no vitals taken for this visit.     REVIEW OF SYSTEMS:  Constitutional: Negative.    HEENT: Negative.    Respiratory: Negative.    Skin: Negative.    Neurological: Negative.    Psychiatric/Behavioral: Negative.  Musculoskeletal: Negative except for that mentioned in the HPI.    Gen: No acute distress, resting comfortably in bed  HEENT: Eyes clear, moist mucus membranes, hearing intact  Respiratory: No audible wheezing or stridor  Cardiovascular: Well Perfused peripherally, 2+ distal pulse  Abdomen: nondistended, no peritoneal signs     PHYSICAL EXAM:     Right Lower extremity:  Incisions are clean dry intact with staples  Staples removed  Right hip range of motion intact    Plantarflexion dorsiflexion  Calf supple    IMAGING: X-ray of the right hip was taken in the office today.  This reviewed demonstrates intact hardware in stable positioning without signs of loosening or failure    ASSESSMENT AND PLAN:  74 y.o. female that is post right short intramedullary nail for intertrochanteric fracture fixation on 9/24/2024.  Should be weightbearing as tolerated.  Will see her back in 4 weeks.  She will start on physical therapy for ambulation and continue aspirin 325 twice daily for DVT prophylaxis.

## 2024-10-08 ENCOUNTER — TELEPHONE (OUTPATIENT)
Age: 74
End: 2024-10-08

## 2024-10-08 ENCOUNTER — NURSING HOME VISIT (OUTPATIENT)
Dept: GERIATRICS | Facility: OTHER | Age: 74
End: 2024-10-08
Payer: COMMERCIAL

## 2024-10-08 DIAGNOSIS — E11.9 TYPE 2 DIABETES MELLITUS WITHOUT COMPLICATION, WITHOUT LONG-TERM CURRENT USE OF INSULIN (HCC): Primary | ICD-10-CM

## 2024-10-08 PROCEDURE — 99307 SBSQ NF CARE SF MDM 10: CPT | Performed by: INTERNAL MEDICINE

## 2024-10-08 RX ORDER — METHOCARBAMOL 500 MG/1
500 TABLET, FILM COATED ORAL 4 TIMES DAILY
Qty: 30 TABLET | Refills: 0 | Status: SHIPPED | OUTPATIENT
Start: 2024-10-08 | End: 2024-10-08 | Stop reason: SDUPTHER

## 2024-10-08 NOTE — PROGRESS NOTES
[facility]  [address] [place] [address] [phone]  [facility]  [address]      NAME: La Garcia  AGE: 74 y.o. SEX: female [phone]    DATE OF ENCOUNTER: 10/8/2024    Assessment and Plan     1. Type 2 diabetes mellitus without complication, without long-term current use of insulin (HCC)  Empagliflozin (Jardiance) 10 MG TABS tablet         Type 2 diabetes mellitus, without long-term current use of insulin (HCC)  Goal Hga1c in geriatric population is >7.5 to avoid hypoglycemia  Lab Results   Component Value Date    HGBA1C 6.8 (H) 09/23/2024     254.0 mg/dL 10/8/2024 12:15     10/8/2024 08:27 165.0 mg/dL     10/7/2024 21:16 341.0 mg/dL     10/7/2024 21:16 341.0 mg/dL     10/7/2024 16:55 228.0 mg/dL     10/7/2024 12:48 199.0 mg/dL     10/7/2024 08:09 190.0 mg/dL     10/6/2024 22:16 274.0 mg/dL     10/6/2024 16:43 249.0 mg/dL     10/6/2024 11:18 250.0 mg/dL     10/6/2024 08:15 164.0 mg/dL     Pt does not want to pay the $35 co-pay for lantus  Dc lantus  D/w pt & son benefits & side effects of jardiance; pt agreeable to start  Start jardiance 10 mg 1/2 tab po daily (hold if sugar <100)  Pt on lispro 5 units qac for sugar >250       Code status: Full    Chief Complaint     [facility] term follow-up visit.    History of Present Illness   Pt seen and examined. Pt complains of muscle spasms. Pt on robaxin but not helping much. Pt and son agreeable to start very low dose of flexeril. Added 2.5 mg po prn muscle spasms.    The following portions of the patient's history were reviewed and updated as appropriate: allergies, current medications, past family history, past medical history, past social history, past surgical history and problem list.    Review of Systems     Review of Systems   All other systems reviewed and are negative.    A comprehensive review of symptoms was obtained.  Pertinent positives and negatives noted in HPI.     Objective     Vitals: Reviewed in [employer] system.  VSS    Weight: 225.8 10/8/2024 13:20        Blood Pressure: 163 /  75 10/8/2024 08:27        Temperature: 99.0 10/8/2024 08:27        Pulse: 80 10/8/2024 08:27        Respirations: 18 10/8/2024 08:27        Blood Sugar: 254.0 10/8/2024 12:15        O2 sats: 97.0 % 10/8/2024 08:27        Height: 62.0 9/27/2024 14:21        Pain Level: 0 10/8/2024 08:27     BP trend higher; pt started on jardiance    General: Awake, alert and oriented  Head: atraumatic, normocephalic  Cardiovascular: RRR  Lungs: Clear to auscultation bilaterally  Abdomen: nontender/nondistended, +BS  Legs: no cyanosis, clubbing or edema  Rt hip: no signs of infection; upper area of surgery sites no longer have staples; lower area still has staples present; notified ortho p.a.  Skin: clean, dry  Psych: calm, cooperative    Pertinent Laboratory/Diagnostic Studies:  Refer to facility chart.    Current Medications   Medications reviewed and updated in facility chart.    Lis Delarosa MD  Internal Medicine  Senior Care Physician

## 2024-10-08 NOTE — ASSESSMENT & PLAN NOTE
Goal Hga1c in geriatric population is >7.5 to avoid hypoglycemia  Lab Results   Component Value Date    HGBA1C 6.8 (H) 09/23/2024     254.0 mg/dL 10/8/2024 12:15     10/8/2024 08:27 165.0 mg/dL     10/7/2024 21:16 341.0 mg/dL     10/7/2024 21:16 341.0 mg/dL     10/7/2024 16:55 228.0 mg/dL     10/7/2024 12:48 199.0 mg/dL     10/7/2024 08:09 190.0 mg/dL     10/6/2024 22:16 274.0 mg/dL     10/6/2024 16:43 249.0 mg/dL     10/6/2024 11:18 250.0 mg/dL     10/6/2024 08:15 164.0 mg/dL     Pt does not want to pay the $35 co-pay for lantus  Dc lantus  D/w pt & son benefits & side effects of jardiance; pt agreeable to start  Start jardiance 10 mg 1/2 tab po daily (hold if sugar <100)  Pt on lispro 5 units qac for sugar >250

## 2024-10-08 NOTE — TELEPHONE ENCOUNTER
Caller: Tolu Son    Doctor: Lorraine    Reason for call: Son would like to know why staples were missed being removed  on Monday. He called last night and I see the PA-C responded. The son was waiting for a call from the office to explain about the missed roque.  Please call Tolu  Thank you    Call back#: [phone]

## 2024-10-09 NOTE — TELEPHONE ENCOUNTER
Called nursing home and spoke with Franny. She informed me the staples were removed. She thanked me for the phone call.

## 2024-10-10 ENCOUNTER — NURSING HOME VISIT (OUTPATIENT)
Dept: GERIATRICS | Facility: OTHER | Age: 74
End: 2024-10-10
Payer: COMMERCIAL

## 2024-10-10 DIAGNOSIS — E11.9 TYPE 2 DIABETES MELLITUS WITHOUT COMPLICATION, WITHOUT LONG-TERM CURRENT USE OF INSULIN (HCC): Primary | ICD-10-CM

## 2024-10-10 PROCEDURE — 99307 SBSQ NF CARE SF MDM 10: CPT | Performed by: INTERNAL MEDICINE

## 2024-10-10 NOTE — PROGRESS NOTES
[facility]  [address] [place] [address] [phone]  [facility]  [address]      NAME: La Garcia  AGE: 74 y.o. SEX: female [id number]    DATE OF ENCOUNTER: 10/10/2024    Assessment and Plan     1. Type 2 diabetes mellitus without complication, without long-term current use of insulin (HCC)           Type 2 diabetes mellitus, without long-term current use of insulin (HCC)  Goal Hga1c in geriatric population is >7.5 to avoid hypoglycemia  Lab Results   Component Value Date    HGBA1C 6.8 (H) 09/23/2024     181.0 mg/dL 10/10/2024 07:56     10/9/2024 22:17 232.0 mg/dL     10/9/2024 22:17 232.0 mg/dL     10/9/2024 16:53 205.0 mg/dL     10/9/2024 11:02 228.0 mg/dL     10/9/2024 08:34 181.0 mg/dL     10/8/2024 20:43 211.0 mg/dL     10/8/2024 17:21 266.0 mg/dL     10/8/2024 16:39 266.0 mg/dL     10/8/2024 12:15 254.0 mg/dL     10/8/2024 08:27 165.0 mg/dL     10/7/2024 21:16 341.0 mg/dL     10/7/2024 21:16 341.0 mg/dL     10/7/2024 16:55 228.0 mg/dL     10/7/2024 12:48 199.0 mg/dL     10/7/2024 08:09 190.0 mg/dL     Reviewed jardiance with pt & pt son  Ok with increasing jardiance to 10 mg po daily (hold if sugar <100 or sbp <90)  Will dc lispro since pt prefers pills over insulin (pt with ckd)  Code status: full    Chief Complaint     [facility] term follow-up visit.    History of Present Illness   Pt seen and examined. Pt with CKD. Physiatrist ASHLEY had spoke to pt & recommended dc lidocaine patch on knees and place voltaren gel. Pt would like to try voltaren gel; explained risk of affecting kidneys but not like ibuprofen. Advised no ibuprofen. Son & pt would like to proceed with the gel. Thus ordered as requested. Pt's kidney function is stable today and K 5.2. Consulted dietary & adjusted diet to low potassium diet. Advised no bananas or sweet potatoes.    Also reviewed pt's anemia with pt and her son. Have order to check stool occult. Reviewed how pt is anemic but no need to for blood  transfusion as pt is asymptomatic. Advised pt & son to have both cbc & bmp checked with pcp when follows up.    Reviewed pt's diabetic regimen. Pt strongly prefers oral pills. Advised no glimepiride as pt with ckd. Will adjust jardiance.    The following portions of the patient's history were reviewed and updated as appropriate: allergies, current medications, past family history, past medical history, past social history, past surgical history and problem list.    Review of Systems     Review of Systems   All other systems reviewed and are negative.    A comprehensive review of symptoms was obtained.  Pertinent positives and negatives noted in HPI.     Objective     Vitals: Reviewed in [employer] system. VSS    General: Awake, alert & oriented x 3  Head: atraumatic, normocephalic  Cardiovascular: RRR  Lungs: Clear to auscultation bilaterally  Abdomen: nontender/nondistended, +BS  Rt hip: both incision sites healing well; no signs of infection; one steristrip on the top & 3 on the bottom one; no staples noted (all have been removed)  Legs: no cyanosis, clubbing  Skin: clean, dry  Psych: calm, cooperative    Pertinent Laboratory/Diagnostic Studies:  Refer to facility chart.    GLUCOSE 148 mg/dL 65-99 H Final              BUN 49 mg/dL 7-25 H Final             CREATININE 2.13 mg/dL 0.40-1.10 H Final             SODIUM 140 mmol/L 135-145  Final             POTASSIUM 5.2 mmol/L 3.5-5.2  Final             CHLORIDE 108 mmol/L 100-109  Final             CARBON DIOXIDE 20 mmol/L 21-31 L Final             CALCIUM 9.4 mg/dL 8.5-10.1  Final             ANION GAP 12  3-11 H Final             eGFRcr 24  >59 L Final             eGFRcr COMMENT (Note)    Final      Interpretive information: calculated GFR                                              Units: mL/min per 1.73 square meters   Reported eGFR is based on the CKD-EPI 2021 creatinine equation that   does not use a race coefficient and conforms to the NKF-ASN Task   Force  Recommendations.   Note: Calculated GFR may not be an accurate indicator of renal   function if the patient's renal function is not in a steady state.                                              GFR Categories in Chronic Kidney Disease (CKD):   GFR        GFR (mL/min/1.73 square meters)   Category:                   Interpretation:   G1         90 or greater    Normal or high*   G2         60 - 89          Mild decrease*   G3a        45 - 59          Mild to moderate decrease   G3b        30 - 44          Moderate to severe decrease   G4         15 - 29          Severe decrease   G5         14 or less       Kidney failure         Current Medications   Medications reviewed and updated in facility chart.    Lis Delarosa MD  Internal Medicine  Senior Care Physician

## 2024-10-10 NOTE — ASSESSMENT & PLAN NOTE
Goal Hga1c in geriatric population is >7.5 to avoid hypoglycemia  Lab Results   Component Value Date    HGBA1C 6.8 (H) 09/23/2024     181.0 mg/dL 10/10/2024 07:56     10/9/2024 22:17 232.0 mg/dL     10/9/2024 22:17 232.0 mg/dL     10/9/2024 16:53 205.0 mg/dL     10/9/2024 11:02 228.0 mg/dL     10/9/2024 08:34 181.0 mg/dL     10/8/2024 20:43 211.0 mg/dL     10/8/2024 17:21 266.0 mg/dL     10/8/2024 16:39 266.0 mg/dL     10/8/2024 12:15 254.0 mg/dL     10/8/2024 08:27 165.0 mg/dL     10/7/2024 21:16 341.0 mg/dL     10/7/2024 21:16 341.0 mg/dL     10/7/2024 16:55 228.0 mg/dL     10/7/2024 12:48 199.0 mg/dL     10/7/2024 08:09 190.0 mg/dL     Reviewed jardiance with pt & pt son  Ok with increasing jardiance to 10 mg po daily (hold if sugar <100 or sbp <90)  Will dc lispro since pt prefers pills over insulin (pt with ckd)

## 2024-10-11 ENCOUNTER — NURSING HOME VISIT (OUTPATIENT)
Dept: GERIATRICS | Facility: OTHER | Age: 74
End: 2024-10-11
Payer: COMMERCIAL

## 2024-10-11 DIAGNOSIS — S72.001D CLOSED FRACTURE OF NECK OF RIGHT FEMUR WITH ROUTINE HEALING, SUBSEQUENT ENCOUNTER: Primary | ICD-10-CM

## 2024-10-11 PROCEDURE — 99307 SBSQ NF CARE SF MDM 10: CPT | Performed by: INTERNAL MEDICINE

## 2024-10-13 NOTE — ASSESSMENT & PLAN NOTE
Right intratrochanteric IM nail placed on September 24, 2024  The following services are medically necessary as provided by the outpatient [facility] service: #1.  Medication management and education #2.  PT/OT for home safety and strengthening related to muscle weakness and gait abnormality #3.  Home health aide for assistance with personal needs/ADLs

## 2024-10-13 NOTE — PROGRESS NOTES
[facility]  [address]  [place] [address]  Facility:  [place] [address]  Follow-up visit    NAME: La Garcia  AGE: 74 y.o. SEX: female    DATE OF ENCOUNTER: October 11, 2024.    Code status:  CPR    Assessment and Plan     1. Closed fracture of neck of right femur with routine healing, subsequent encounter  Assessment & Plan:  Right intratrochanteric IM nail placed on September 24, 2024  The following services are medically necessary as provided by the outpatient VNA service: #1.  Medication management and education #2.  PT/OT for home safety and strengthening related to muscle weakness and gait abnormality #3.  Home health aide for assistance with personal needs/ADLs      All medications and routine orders were reviewed and updated as needed.    Plan discussed with: Patient and social service staff.    Chief Complaint     She is seen for a follow-up visit to complete a face-to-face visit to coordinate her discharge from the subacute rehabilitation facility with her ambulatory VNA services.    History of Present Illness     She is a pleasant 74-year-old woman who is seen for a visit to complete a face-to-face visit to coordinate her discharge from the subacute rehabilitation facility with her ambulatory VNA services.  She was hospitalized from September 23, 2024 through September 27, 2024 after suffering a right hip fracture related to a mechanical fall.  She underwent an IM femur nail antegrade trochanteric surgery on September 24, 2024.  She has been at the subacute rehabilitation facility since September 27, 2024 and is now ready to transition to home with VNA services per social service staff.    Other than concern regarding adaptive equipment for her toileting, she has no complaints.    The following portions of the patient's history were reviewed and updated as appropriate: current medications, past family history, past medical history, past social history,  "past surgical history and problem list.    Allergies:  No Known Allergies    Review of Systems     See HPI    Medications and orders     All medications reviewed and updated in group home EMR.      Objective     Vitals: Weight 220.2 pounds, pulse 71, blood pressure 138/61.    Physical Exam  Constitutional:       General: She is awake. She is not in acute distress.     Appearance: She is well-developed. She is not toxic-appearing or diaphoretic.   Cardiovascular:      Rate and Rhythm: Normal rate and regular rhythm.      Heart sounds: Normal heart sounds. No murmur heard.     No friction rub. No gallop.   Pulmonary:      Effort: No respiratory distress.      Breath sounds: Normal breath sounds. No stridor. No wheezing, rhonchi or rales.   Neurological:      Mental Status: She is alert.   Psychiatric:         Mood and Affect: Mood normal.         Behavior: Behavior normal. Behavior is cooperative.     - Face-to-face form completed for her ambulatory VNA services.      Portions of the record may have been created with voice recognition software.  Occasional wrong word or \"sound a like\" substitutions may have occurred due to the inherent limitations of voice recognition software.  Read the chart carefully and recognize, using context, where substitutions have occurred.    Antonio Lopes M.D.  10/13/2024 3:00 PM      "

## 2024-10-14 ENCOUNTER — TELEPHONE (OUTPATIENT)
Age: 74
End: 2024-10-14

## 2024-10-14 ENCOUNTER — DOCUMENTATION (OUTPATIENT)
Dept: GERIATRICS | Facility: OTHER | Age: 74
End: 2024-10-14

## 2024-10-14 NOTE — TELEPHONE ENCOUNTER
Caller: Tolu- son     Doctor: Lorraine    Reason for call: Patients son is calling stating his mom has cannot get out of her bed so she was sleeping in the recliner but then today she couldn't get out of the recliner because of her hip. He wanted to know if there is something that an order can be placed to help her with this. He stated he spoke with his mothers insurance and they will cover 80%  equipment as long as there is a script from the surgeon    Call back#: [phone]

## 2024-10-15 DIAGNOSIS — S72.001D CLOSED FRACTURE OF NECK OF RIGHT FEMUR WITH ROUTINE HEALING, SUBSEQUENT ENCOUNTER: Primary | ICD-10-CM

## 2024-10-15 NOTE — TELEPHONE ENCOUNTER
Called and spoke to pts son Tolu, emergency contact, advised to call insurance company to find DME supplier and then call back and we can fax the orders. He stated understanding

## 2024-10-15 NOTE — PROGRESS NOTES
Discharge Summary:    Diagnoses upon discharge:  1) s/p Rt femur neck fracture  2) DMII  3) HTN  4) CKD4  5) Mild hyperkalemia (improved to 5.2)    Pt given script for BMP to f/u her hyperkalemia. Pt advised to avoid bananas, sweet potatoes,etc. Pt evaluated by dietary at [facility]    Given pt's CKD4, pt was taken off glimepiride. Pt underwent a trial of lantus/lispro. However pt strongly preferred oral agents. Thus in shared decision making with pt & her son, she was switched to jardiance 10 mg 1 tab po daily.  Risk/benefits reviewed.    Pt to see her PCP in 1 week from dc from [facility].  Pt instructed to keep future ortho appt.  Pt instructed to see nephrology re: CKD4 & recent hyperkalemia    Pt discharged from [facility] on 10/13/2024.

## 2024-10-15 NOTE — TELEPHONE ENCOUNTER
Caller: Tolu, son    Doctor: Lorraine     Reason for call: He is requesting a DME order placed for a wide wheelchair as well  Also, he is not sure how to go about getting these items. He is requesting a call back for assistance    Call back#: [phone]

## 2024-10-15 NOTE — TELEPHONE ENCOUNTER
Caller: Tolu-son    Doctor: Lorraine    Reason for call: pt's son called with the info for the DME supplier. If we can send over the DME order to [facility] on [address]. They only have the phone number [phone].    Also the pt's son has asked for an order for a wide wheelchair    Call back#: [phone]

## 2024-10-15 NOTE — TELEPHONE ENCOUNTER
I placed a DME order for her for a stand assist recliner. She will need to find a DME supplier through her insurance.

## 2024-10-16 DIAGNOSIS — S72.001D CLOSED FRACTURE OF NECK OF RIGHT FEMUR WITH ROUTINE HEALING, SUBSEQUENT ENCOUNTER: Primary | ICD-10-CM

## 2024-10-16 NOTE — TELEPHONE ENCOUNTER
Caller: Tolu (son)    Reason for call: Son is calling for the Script for Stand Assist Recliner and the Wide wheel chair to be sent to [employer] so it can be delivered to patients home.  Standard wheelchair rubs hear her incision and hurts her which is why she needs the wide wheelchair.     Please call, wants to make sure these are both sent in and he can get these delivered for his mom.       Call back#: [phone]

## 2024-10-17 NOTE — TELEPHONE ENCOUNTER
Caller: SYDNEE (SON)    Doctor: Lorraine    Reason for call:     Sydnee is calling to cancel the stand assist recliner, he decided he will purchase one, but he still needs the wide wheelchair to be ordered.  Any questions please give him a call.    Call back#: [phone]-

## 2024-10-18 LAB

## 2024-10-18 NOTE — TELEPHONE ENCOUNTER
Per Colt Flannery's message, Dr. Peters needs to sign. Dr. Peters will be back in office this Monday 10/21/24. Will address and call pt. Then. Thank you

## 2024-11-06 ENCOUNTER — HOSPITAL ENCOUNTER (OUTPATIENT)
Dept: RADIOLOGY | Facility: HOSPITAL | Age: 74
Discharge: HOME | End: 2024-11-06
Attending: ORTHOPAEDIC SURGERY
Payer: COMMERCIAL

## 2024-11-06 ENCOUNTER — OFFICE VISIT (OUTPATIENT)
Dept: OBGYN CLINIC | Facility: CLINIC | Age: 74
End: 2024-11-06

## 2024-11-06 VITALS
WEIGHT: 211 LBS | HEART RATE: 67 BPM | DIASTOLIC BLOOD PRESSURE: 77 MMHG | SYSTOLIC BLOOD PRESSURE: 135 MMHG | BODY MASS INDEX: 38.83 KG/M2 | HEIGHT: 62 IN

## 2024-11-06 DIAGNOSIS — Z98.890 STATUS POST SURGERY: Primary | ICD-10-CM

## 2024-11-06 DIAGNOSIS — Z98.890 STATUS POST SURGERY: ICD-10-CM

## 2024-11-06 PROCEDURE — 73502 X-RAY EXAM HIP UNI 2-3 VIEWS: CPT

## 2024-11-06 PROCEDURE — 99024 POSTOP FOLLOW-UP VISIT: CPT | Performed by: ORTHOPAEDIC SURGERY

## 2024-11-06 NOTE — PROGRESS NOTES
[facility] [address] [id number] [phone]       La Garcia  [id number]  1950    ORTHOPAEDIC SURGERY OUTPATIENT NOTE  11/6/2024      HISTORY:  74 y.o. female  status post right short trochanteric nail fixation for intertrochanteric fracture performed on 9/24/2024. She reports she has been doing well overall. She states the pain in her hip has been doing well. She has transitioned to Asprin 81. She is overall doing well since surgery.      Past Medical History:   Diagnosis Date    Arthritis     Diabetes mellitus (HCC)     Hypertension     Mixed hyperlipidemia 09/23/2024    Renal disorder        Past Surgical History:   Procedure Laterality Date    APPENDECTOMY      CHOLECYSTECTOMY      HERNIA REPAIR      [place] OPTX FEM SHFT FX W/INSJ IMED IMPLT W/WO SCREW Right 9/24/2024    Procedure: INSERTION NAIL IM FEMUR ANTEGRADE (TROCHANTERIC)- right;  Surgeon: John Peters DO;  Location: [facility];  Service: Orthopedics       Social History     Socioeconomic History    Marital status:      Spouse name: Not on file    Number of children: Not on file    Years of education: Not on file    Highest education level: Not on file   Occupational History    Not on file   Tobacco Use    Smoking status: Never    Smokeless tobacco: Never   Vaping Use    Vaping status: Never Used   Substance and Sexual Activity    Alcohol use: Not Currently    Drug use: Not Currently    Sexual activity: Not on file   Other Topics Concern    Not on file   Social History Narrative    Not on file     Social Determinants of Health     Financial Resource Strain: High Risk (6/12/2024)    Received [facility]    Overall Financial Resource Strain (CARDIA)     Difficulty of Paying Living Expenses: Hard   Food Insecurity: No Food Insecurity (9/24/2024)    Nursing - Inadequate Food Risk Classification     Worried About Running Out of  Food in the Last Year: Never true     Ran Out of Food in the Last Year: Never true     Ran Out of Food in the Last Year: Not on file   Transportation Needs: No Transportation Needs (9/24/2024)    PRAPARE - Transportation     Lack of Transportation (Medical): No     Lack of Transportation (Non-Medical): No   Physical Activity: Not on file   Stress: Not on file   Social Connections: Feeling Somewhat Isolated (6/12/2024)    Received from [facility]    OASIS : Social Isolation     How often do you feel lonely or isolated from those around you?: Sometimes   Intimate Partner Violence: Not At Risk (6/12/2024)    Received from [facility]    Humiliation, Afraid, Rape, and Kick questionnaire     Fear of Current or Ex-Partner: No     Emotionally Abused: No     Physically Abused: No     Sexually Abused: No   Housing Stability: Low Risk  (9/24/2024)    Housing Stability Vital Sign     Unable to Pay for Housing in the Last Year: No     Number of Times Moved in the Last Year: 0     Homeless in the Last Year: No       No family history on file.     Patient's Medications   New Prescriptions    No medications on file   Previous Medications    ASPIRIN (ECOTRIN LOW STRENGTH) 81 MG EC TABLET    Take 1 tablet (81 mg total) by mouth daily Can resume after completing 35 days of Aspirin 325mg BID on 11/02/24    ASPIRIN 325 MG TABLET    Take 1 tablet (325 mg total) by mouth 2 (two) times a day with meals    CHOLECALCIFEROL (VITAMIN D3) 50 MCG (2000 UT) CAPSULE    Take 2,000 Units by mouth daily    EMPAGLIFLOZIN (JARDIANCE) 10 MG TABS TABLET    Take 1 tablet (10 mg total) by mouth every morning Hold if sugar <100   Modified Medications    No medications on file   Discontinued Medications    No medications on file       No Known Allergies     There were no vitals taken for this visit.     REVIEW OF SYSTEMS:  Constitutional: Negative.    HEENT: Negative.    Respiratory: Negative.    Skin: Negative.     Neurological: Negative.    Psychiatric/Behavioral: Negative.  Musculoskeletal: Negative except for that mentioned in the HPI.    Gen: No acute distress, resting comfortably in bed  HEENT: Eyes clear, moist mucus membranes, hearing intact  Respiratory: No audible wheezing or stridor  Cardiovascular: Well Perfused peripherally, 2+ distal pulse  Abdomen: nondistended, no peritoneal signs     PHYSICAL EXAM:      Sitting in no acute distress  Able to walk forward and back with assistance from walker   NVI distally     IMAGING:  X-rays of the right hip were obtained and reviewed in clinic today. Views demonstrate intact hardware in stable position without evidence of hardware failure or loosening. Views demonstrate interval healing of previous fracture.     ASSESSMENT AND PLAN:  74 y.o. female  status post right short trochanteric nail fixation for intertrochanteric fracture performed on 9/24/2024. It was discussed that she can continue PT as needed. We would like her to wait 6 more weeks till she returns to driving. She will follow up in clinic as needed.

## 2024-11-11 ENCOUNTER — TELEPHONE (OUTPATIENT)
Age: 74
End: 2024-11-11

## 2024-11-11 NOTE — TELEPHONE ENCOUNTER
"Phone call from patient to cancel Hospital Follow Up Appointment 12/2/24. Patient doing rehab at home, and unable to walk \"does not have transportation, and does not want to pay for service to bring her to appt\".    Informed patient that I would speak to clinical team for a possible virtual appt. Patient declined stating \"she is 72, and unless over the phone, she couldn't do\". I informed patient I would speak to clinical team to inform, and patient stated \"just to cancel appt, she will call back when better\". Therefore, appt cancelled at patient's request.   "

## 2024-12-17 ENCOUNTER — TELEPHONE (OUTPATIENT)
Dept: OBGYN CLINIC | Facility: HOSPITAL | Age: 74
End: 2024-12-17

## 2024-12-17 NOTE — TELEPHONE ENCOUNTER
Called and spoke with pt and relayed Dr Peters's message. She states she can't afford an xray because it will cost her $20 every time she gets an xray and she only has $14 left until 1/11/25. She states she cannot afford transportation to get here to the office either. She states that she cries every night if she has to get out of bed because of the pain she has in her thigh down to the knee. She tried taking tylenol and it didn't help so she is taking excedrin but it doesn't help much either. She states the pain is the same since surgery, nothing is new, she has no redness or swelling just pain. Advised pt I would contact  to see if there is anything they can help with with the cost. Will call pt back once I get any information or someone will be in touch.

## 2024-12-17 NOTE — TELEPHONE ENCOUNTER
Caller: La     Doctor: Lorraine Whitfield     Reason for call: Patient has R hip surgery on 9/24.    Patient states she is having a lot of pain especially getting up and walking where the balta has been placed.  She said it has been 13 weeks and she wants to know how much longer until the pain resolves.  Tylenol does not work.  Excedrin does not work.  Patient is unable to schedule a visit at this time due to the cost of transport.    Pain scale is 9/10     Please advise     Call back#: [phone]

## 2024-12-18 ENCOUNTER — HOSPITAL ENCOUNTER (OUTPATIENT)
Dept: RADIOLOGY | Facility: HOSPITAL | Age: 74
Discharge: HOME | End: 2024-12-18
Attending: ORTHOPAEDIC SURGERY
Payer: COMMERCIAL

## 2024-12-18 ENCOUNTER — TELEPHONE (OUTPATIENT)
Dept: OBGYN CLINIC | Facility: HOSPITAL | Age: 74
End: 2024-12-18

## 2024-12-18 ENCOUNTER — OFFICE VISIT (OUTPATIENT)
Dept: OBGYN CLINIC | Facility: CLINIC | Age: 74
End: 2024-12-18

## 2024-12-18 VITALS
DIASTOLIC BLOOD PRESSURE: 71 MMHG | HEART RATE: 74 BPM | BODY MASS INDEX: 38.83 KG/M2 | SYSTOLIC BLOOD PRESSURE: 130 MMHG | WEIGHT: 211 LBS | HEIGHT: 62 IN

## 2024-12-18 DIAGNOSIS — Z98.890 STATUS POST SURGERY: ICD-10-CM

## 2024-12-18 DIAGNOSIS — Z98.890 STATUS POST SURGERY: Primary | ICD-10-CM

## 2024-12-18 DIAGNOSIS — E66.01 SEVERE OBESITY (HCC): ICD-10-CM

## 2024-12-18 DIAGNOSIS — M17.11 ARTHRITIS OF KNEE, RIGHT: ICD-10-CM

## 2024-12-18 PROCEDURE — 73502 X-RAY EXAM HIP UNI 2-3 VIEWS: CPT

## 2024-12-18 PROCEDURE — 99024 POSTOP FOLLOW-UP VISIT: CPT | Performed by: ORTHOPAEDIC SURGERY

## 2024-12-18 NOTE — PROGRESS NOTES
[facility] [address] [address] [phone]       La Garcia  [id number]  1950    ORTHOPAEDIC SURGERY OUTPATIENT NOTE  12/18/2024      HISTORY:  74 y.o. female  presents for follow up on her right leg. She is 3 months s/p right short TFN for IT fracture. She reports continued pain in the right thigh and knee. Some groin pain occasionally when walking. She has pain in the lateral thigh and knee when sleeping.     Past Medical History:   Diagnosis Date    Arthritis     Diabetes mellitus (HCC)     Hypertension     Mixed hyperlipidemia 09/23/2024    Renal disorder        Past Surgical History:   Procedure Laterality Date    APPENDECTOMY      CHOLECYSTECTOMY      HERNIA REPAIR      [place] OPTX FEM SHFT FX W/INSJ IMED IMPLT W/WO SCREW Right 9/24/2024    Procedure: INSERTION NAIL IM FEMUR ANTEGRADE (TROCHANTERIC)- right;  Surgeon: John Peters DO;  Location:  MAIN OR;  Service: Orthopedics       Social History     Socioeconomic History    Marital status:      Spouse name: Not on file    Number of children: Not on file    Years of education: Not on file    Highest education level: Not on file   Occupational History    Not on file   Tobacco Use    Smoking status: Never    Smokeless tobacco: Never   Vaping Use    Vaping status: Never Used   Substance and Sexual Activity    Alcohol use: Not Currently    Drug use: Not Currently    Sexual activity: Not on file   Other Topics Concern    Not on file   Social History Narrative    Not on file     Social Drivers of Health     Financial Resource Strain: High Risk (6/12/2024)    Received from [facility]    Overall Financial Resource Strain (CARDIA)     Difficulty of Paying Living Expenses: Hard   Food Insecurity: No Food Insecurity (9/24/2024)    Nursing - Inadequate Food Risk Classification     Worried About Running Out of Food in the Last Year: Never true     " Ran Out of Food in the Last Year: Never true     Ran Out of Food in the Last Year: Not on file   Transportation Needs: No Transportation Needs (9/24/2024)    PRAPARE - Transportation     Lack of Transportation (Medical): No     Lack of Transportation (Non-Medical): No   Physical Activity: Not on file   Stress: Not on file   Social Connections: Feeling Somewhat Isolated (6/12/2024)    Received from [facility]    OASIS : Social Isolation     How often do you feel lonely or isolated from those around you?: Sometimes   Intimate Partner Violence: Not At Risk (6/12/2024)    Received from [facility]    Humiliation, Afraid, Rape, and Kick questionnaire     Fear of Current or Ex-Partner: No     Emotionally Abused: No     Physically Abused: No     Sexually Abused: No   Housing Stability: Low Risk  (9/24/2024)    Housing Stability Vital Sign     Unable to Pay for Housing in the Last Year: No     Number of Times Moved in the Last Year: 0     Homeless in the Last Year: No       History reviewed. No pertinent family history.     Patient's Medications   New Prescriptions    No medications on file   Previous Medications    ASPIRIN (ECOTRIN LOW STRENGTH) 81 MG EC TABLET    Take 1 tablet (81 mg total) by mouth daily Can resume after completing 35 days of Aspirin 325mg BID on 11/02/24    ASPIRIN 325 MG TABLET    Take 1 tablet (325 mg total) by mouth 2 (two) times a day with meals    CHOLECALCIFEROL (VITAMIN D3) 50 MCG (2000 UT) CAPSULE    Take 2,000 Units by mouth daily    EMPAGLIFLOZIN (JARDIANCE) 10 MG TABS TABLET    Take 1 tablet (10 mg total) by mouth every morning Hold if sugar <100   Modified Medications    No medications on file   Discontinued Medications    No medications on file       No Known Allergies     /71 (BP Location: Right arm, Patient Position: Sitting, Cuff Size: Standard)   Pulse 74   Ht 5' 2\" (1.575 m)   Wt 95.7 kg (211 lb)   BMI 38.59 kg/m²      REVIEW OF " SYSTEMS:  Constitutional: Negative.    HEENT: Negative.    Respiratory: Negative.    Skin: Negative.    Neurological: Negative.    Psychiatric/Behavioral: Negative.  Musculoskeletal: Negative except for that mentioned in the HPI.    Gen: No acute distress, resting comfortably in bed  HEENT: Eyes clear, moist mucus membranes, hearing intact  Respiratory: No audible wheezing or stridor  Cardiovascular: Well Perfused peripherally, 2+ distal pulse  Abdomen: nondistended, no peritoneal signs     PHYSICAL EXAM:    Right lower extremity  Incisions well healed  Mildly tender along the greater trochanter  No pain with rotation of the hip or flexion  Painful resisted knee extension  No erythema or swelling of the thigh        IMAGING:  XR of the right hip taken today, this was reviewed and demonstrates intact hardware with healing at the fracture site.     Previous right femur XR reviewed these demonstrate end stage arthritis with osteophyte formation.     ASSESSMENT AND PLAN:  74 y.o. female  s/p right short TFN 3 months ago. Most of her pain comes from her knee arthritis. We discussed her hip is healing. She would benefit from a referral to a total joint specialist. She may be WBAT. She may continue tylenol arthritis for pain. We will see her back as needed for her hip.     Scribe Attestation      I,:  Colt Flannery PA-C am acting as a scribe while in the presence of the attending physician.:       I,:  Jhon Peters personally performed the services described in this documentation    as scribed in my presence.:

## 2024-12-18 NOTE — TELEPHONE ENCOUNTER
Caller: La    Doctor: Lorraine    Reason for call: Patient is in extreme pain with the balta in her leg she said. I offered this morning 8:45 and this afternoon 2:15, she did not have enough time to get ready and come over, and 2:15 was too late for her. I got her in first thing tomorrow morning.   Patient is upset she cannot afford an xray etc.     Call back#: [phone]    Attempted to Transferred to Nurse (Warmly), patient disconnected call. Nurse will call her back.

## 2024-12-18 NOTE — TELEPHONE ENCOUNTER
Attempted to call patient, LMOM stating Nurses are trying to get a hold of her to discuss what is going on with her leg, I advised our nurses numbers come up as [phone] numbers and to try to answer. I also asked her to call us back.

## 2024-12-18 NOTE — TELEPHONE ENCOUNTER
Patient called back, looks like she is coming in today at 2:15 pm to be checked. She was calling to make a payment plan. I gave her the number to the billing department.

## 2025-03-01 ENCOUNTER — TELEPHONE (OUTPATIENT)
Dept: OTHER | Facility: OTHER | Age: 75
End: 2025-03-01

## 2025-03-03 ENCOUNTER — NURSING HOME VISIT (OUTPATIENT)
Dept: GERIATRICS | Facility: OTHER | Age: 75
End: 2025-03-03
Payer: COMMERCIAL

## 2025-03-03 DIAGNOSIS — N39.3 STRESS INCONTINENCE OF URINE: ICD-10-CM

## 2025-03-03 DIAGNOSIS — Z96.651 STATUS POST RIGHT KNEE REPLACEMENT: Primary | ICD-10-CM

## 2025-03-03 DIAGNOSIS — R26.2 AMBULATORY DYSFUNCTION: ICD-10-CM

## 2025-03-03 DIAGNOSIS — E11.9 TYPE 2 DIABETES MELLITUS WITHOUT COMPLICATION, WITHOUT LONG-TERM CURRENT USE OF INSULIN (HCC): ICD-10-CM

## 2025-03-03 DIAGNOSIS — N18.4 STAGE 4 CHRONIC KIDNEY DISEASE (HCC): ICD-10-CM

## 2025-03-03 PROCEDURE — 99306 1ST NF CARE HIGH MDM 50: CPT | Performed by: FAMILY MEDICINE

## 2025-03-03 RX ORDER — GLIMEPIRIDE 2 MG/1
2 TABLET ORAL 2 TIMES DAILY
COMMUNITY

## 2025-03-03 RX ORDER — CEFADROXIL 500 MG/1
500 CAPSULE ORAL EVERY 12 HOURS SCHEDULED
COMMUNITY
End: 2025-03-08

## 2025-03-03 RX ORDER — OXYBUTYNIN CHLORIDE 5 MG/1
5 TABLET ORAL DAILY
COMMUNITY

## 2025-03-03 RX ORDER — SENNA AND DOCUSATE SODIUM 50; 8.6 MG/1; MG/1
1 TABLET, FILM COATED ORAL DAILY
COMMUNITY
End: 2025-03-13

## 2025-03-03 NOTE — PROGRESS NOTES
[facility]  [address]  [address] [facility] acute [facility]  History and Physical    NAME: La Garcia  AGE: 74 y.o. SEX: female [id number]    DATE OF ENCOUNTER: 3/3/2025    Code status:  CPR    Assessment and Plan     1. Status post right knee replacement (Primary)  Patient was recently discharged from [facility] status post right knee replacement on 2/27.  Continue physical therapy and Occupational Therapy  Continue pain management: Tylenol 1000 mg 3 times daily and oxycodone 5 mg every 4 hours as needed and taper down  Continue with bowel regimen  Activity and weightbearing status as tolerated with assist device  Follow-up within 4 weeks with orthopedics  Continue DVT prophylaxis currently on Eliquis 2.5 mg twice daily      2. Type 2 diabetes mellitus without complication, without long-term current use of insulin (HCC)  Will continue patient with glimepiride 2 mg twice daily and Jardiance 10mg daily     3. Stage 4 chronic kidney disease (HCC)  Baseline creatinine around 2.0  Encourage oral hydration, avoid hypotension and nephrotoxins  Follow-up with repeat BMP within 1 week    4. Stress incontinence of urine  Continue home dose of oxybutynin 5 mg daily    5. Ambulatory dysfunction  Continue ongoing physical therapy/Occupational Therapy at short-term rehab    Plan discussed with: Patient    Chief Complaint     Seen for admission at Nursing Facility    History of Present Illness     Ms Tovar is a 74-year-old female who recently admitted at [facility] for symptomatic right knee arthritis and underwent total right knee arthroplasty on 2/27.  Patient had significant limitation in range of motion and noted to have increased crepitus.  At that time patient was also noted to have significant joint swelling and effusions.  Overall patient's quality of life was limiting along with daily activities and ambulatory capacity.  Patient initially underwent  symptomatic management without any relief.  Patient also unable to tolerate physical therapy due to significant decreased range of motion and pain.  Given all conservative measurements have failed patient underwent total right knee arthroplasty on 2/27/2025 with Dr. Valladares.   Patient was discharged to [place] [facility] for STR.     HISTORY:  Past Medical History:   Diagnosis Date   • Arthritis    • Diabetes mellitus (HCC)    • Hypertension    • Mixed hyperlipidemia 09/23/2024   • Renal disorder      No family history on file.  Social History     Socioeconomic History   • Marital status:      Spouse name: Not on file   • Number of children: Not on file   • Years of education: Not on file   • Highest education level: Not on file   Occupational History   • Not on file   Tobacco Use   • Smoking status: Never   • Smokeless tobacco: Never   Vaping Use   • Vaping status: Never Used   Substance and Sexual Activity   • Alcohol use: Not Currently   • Drug use: Not Currently   • Sexual activity: Not on file   Other Topics Concern   • Not on file   Social History Narrative   • Not on file     Social Drivers of Health     Financial Resource Strain: Not At Risk (2/27/2025)    Received from [facility]    Financial Insecurity    • In the last 12 months did you skip medications to save money?: No    • In the last 12 months was there a time when you needed to see a doctor but could not because of cost?: No   Food Insecurity: No Food Insecurity (2/27/2025)    Received from [facility]    Food Insecurity    • In the last 12 months did you ever eat less than you felt you should because there wasn't enough money for food?: No   Transportation Needs: Unmet Transportation Needs (2/27/2025)    Received from [facility]    Transportation Needs    • In the last 12 months have you ever had to go without healthcare because you didn't have a way to get there?: Yes   Physical  Activity: Not on file   Stress: Not on file   Social Connections: Socially Integrated (2/27/2025)    Received from [facility]    Social Connection    • Do you often feel lonely?: No   Intimate Partner Violence: Not At Risk (6/12/2024)    [facility] from [facility], [facility]    Humiliation, Afraid, Rape, and Kick questionnaire    • Fear of Current or Ex-Partner: No    • Emotionally Abused: No    • Physically Abused: No    • Sexually Abused: No   Housing Stability: Not At Risk (2/27/2025)    Received from [facility]    Housing Stability    • Are you worried that in the next 2 months you may not have stable housing?: No       Allergies:  No Known Allergies    Review of Systems     Review of Systems   Constitutional:  Negative for chills and fever.   HENT:  Negative for ear pain and sore throat.    Eyes:  Negative for pain and visual disturbance.   Respiratory:  Negative for cough and shortness of breath.    Cardiovascular:  Negative for chest pain and palpitations.   Gastrointestinal:  Positive for constipation. Negative for abdominal pain and vomiting.   Genitourinary:  Negative for dysuria and hematuria.   Musculoskeletal:  Positive for joint swelling (Knee pain s/p replacement). Negative for arthralgias and back pain.   Skin:  Negative for color change and rash.   Neurological:  Negative for seizures and syncope.   All other systems reviewed and are negative.      Medications and orders     All medications reviewed and updated in detention EMR.      Objective     Vitals: Temperature 97.6, heart rate 70, respiratory rate 16, blood pressure 141/67, oxygen saturation 98%, weight 203.5 pounds     Physical Exam  Vitals and nursing note reviewed.   Constitutional:       General: She is not in acute distress.     Appearance: She is well-developed.   HENT:      Head: Normocephalic and atraumatic.   Eyes:      Conjunctiva/sclera: Conjunctivae normal.    Cardiovascular:      Rate and Rhythm: Normal rate and regular rhythm.      Heart sounds: No murmur heard.  Pulmonary:      Effort: Pulmonary effort is normal. No respiratory distress.      Breath sounds: Normal breath sounds.   Abdominal:      Palpations: Abdomen is soft.      Tenderness: There is no abdominal tenderness.   Musculoskeletal:         General: Tenderness (right knee s/p replacement) present.      Cervical back: Neck supple.      Comments: Trace bilateral pitting edema   Skin:     General: Skin is warm and dry.   Neurological:      Mental Status: She is alert. Mental status is at baseline.   Psychiatric:         Mood and Affect: Mood normal.        Pertinent Laboratory/Diagnostic Studies:   The following labs/studies were reviewed please see chart or hospital paperwork for details.    Results from last 7 days   Lab Units 02/28/25  0920   HEMOGLOBIN g/dL 9.8*   HEMATOCRIT % 29.4*     Results from last 7 days   Lab Units 02/28/25  0920 02/27/25  0607   CREATININE mg/dL 2.08* 2.00*   BUN mg/dL 52* 53*   SODIUM mmol/L 134* 139   POTASSIUM mmol/L 4.5 4.6   CHLORIDE mmol/L 107 110*   CARBON DIOXIDE mmol/L 20* 19*   GLUCOSE (GLUC) mg/dL 322* 179*       CT (GEOVANNI) LOWER EXTREMITY RIGHT WO CONTRAST  Result Date: 2/4/2025  Impression: 1. Severe osteoarthritis of the RIGHT knee; multiple large, intra-articular calcific bodies, consistent with secondary synovial chondromatosis 2. 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 Large Baker cyst 4. Osteopenia     - Counseling Documentation: patient was counseled regarding: impressions

## 2025-03-05 ENCOUNTER — NURSING HOME VISIT (OUTPATIENT)
Dept: GERIATRICS | Facility: OTHER | Age: 75
End: 2025-03-05
Payer: COMMERCIAL

## 2025-03-05 VITALS
BODY MASS INDEX: 37.22 KG/M2 | OXYGEN SATURATION: 96 % | TEMPERATURE: 97.5 F | WEIGHT: 203.5 LBS | SYSTOLIC BLOOD PRESSURE: 132 MMHG | DIASTOLIC BLOOD PRESSURE: 68 MMHG | HEART RATE: 83 BPM | RESPIRATION RATE: 18 BRPM

## 2025-03-05 DIAGNOSIS — Z96.651 STATUS POST RIGHT KNEE REPLACEMENT: Primary | ICD-10-CM

## 2025-03-05 DIAGNOSIS — E11.9 TYPE 2 DIABETES MELLITUS WITHOUT COMPLICATION, WITHOUT LONG-TERM CURRENT USE OF INSULIN (HCC): ICD-10-CM

## 2025-03-05 DIAGNOSIS — R26.2 AMBULATORY DYSFUNCTION: ICD-10-CM

## 2025-03-05 DIAGNOSIS — N18.4 STAGE 4 CHRONIC KIDNEY DISEASE (HCC): ICD-10-CM

## 2025-03-05 DIAGNOSIS — I10 PRIMARY HYPERTENSION: ICD-10-CM

## 2025-03-05 PROCEDURE — 99309 SBSQ NF CARE MODERATE MDM 30: CPT

## 2025-03-05 NOTE — PROGRESS NOTES
[facility]  [address] [address] [phone]  [place] [facility]  Code 31 (STR)        NAME: La Garcia  AGE: 74 y.o. SEX: female CODE STATUS: CPR    DATE OF ENCOUNTER: 3/6/2025    Assessment and Plan     1. Status post right knee replacement  Assessment & Plan:  Patient with history of decreased range of motion  Failed conservative treatment  S/p right total knee arthroplasty 2/27/2025  Currently on Eliquis 2.5 mg twice daily  Outpatient follow-up with Ortho  Continue Tylenol, oxycodone for pain management  Continue PT/OT  Maintain fall/safety precautions.  2. Type 2 diabetes mellitus without complication, without long-term current use of insulin (HCC)  Assessment & Plan:    Lab Results   Component Value Date    HGBA1C 7.4 (H) 12/12/2024   Continue Amaryl 2 mg twice daily  Continue Farxiga 5 mg daily (auto substituted for home dose of Jardiance while at rehab)  Encourage diabetic diet  Avoid hypoglycemia  Monitor A1c  3. Primary hypertension  Assessment & Plan:  BP stable, 132/68  Continue to monitor BP off medications  4. Stage 4 chronic kidney disease (HCC)  Assessment & Plan:  Lab Results   Component Value Date    EGFR 24 (L) 02/28/2025    EGFR 25 (L) 02/27/2025    EGFR 27 (L) 02/04/2025    CREATININE 2.08 (H) 02/28/2025    CREATININE 2 (H) 02/27/2025    CREATININE 1.91 (H) 02/04/2025   Baseline creatinine 2.0  3/5/2025 CR 1.91/BUN 49/GFR 27  Avoid nephrotoxins  Encourage adequate p.o. hydration  Monitor kidney functions  5. Ambulatory dysfunction  Assessment & Plan:  Multifactorial in the setting of right knee replacement, chronic medical conditions  Continue PT/OT  Fall Precautions  Ensure adequate nutrition/hydration   Monitor CBC/BMP    following for d/c planning         All medications and routine orders were reviewed and updated as needed.    Chief Complaint     STR follow up visit  Patient's care was coordinated with nursing facility staff. Recent vitals,  labs, and updated medications were review on [facility] system in facility.  Past Medical and Surgical History      Past Medical History:   Diagnosis Date    Arthritis     Diabetes mellitus (HCC)     Hypertension     Mixed hyperlipidemia 09/23/2024    Renal disorder      Past Surgical History:   Procedure Laterality Date    APPENDECTOMY      CHOLECYSTECTOMY      HERNIA REPAIR      NH OPTX FEM SHFT FX W/INSJ IMED IMPLT W/WO SCREW Right 9/24/2024    Procedure: INSERTION NAIL IM FEMUR ANTEGRADE (TROCHANTERIC)- right;  Surgeon: John Peters DO;  Location:  MAIN OR;  Service: Orthopedics     No Known Allergies       History of Present Illness     HPI  La Garcia is a 74 year old female, she is a STR patient of [facility] since 3/1/25. Past Medical Hx including but not limited to HTN, DM2, OA, CKD4, JEREMÍAS. She was seen in collaboration with nursing for medical mgmt and STR follow up.     Hospital Course  Patient was admitted to the hospital 2/27/25. Patient has had decrease ROM, increased crepitus.  Given failure of conservative measurements patient underwent total right knee arthroplasty on 2/27/2025.  Patient had uncomplicated hospital stay.  She was recommended for rehab [facility]    Rehab course  La was seen and examined at bedside today.  Patient is alert and oriented x 3.  On exam patient is sitting in her chair at side of bed.  She is not in any distress.  Dressing to right knee is intact, no S/S of infection.  She reports that physical therapy has been going very well.  She is hoping to be discharged before her 21st day due to insurance coverage.  She has Tylenol and oxycodone available for pain control.  She does report some constipation, did have a bowel movement this morning.  She is currently on senna as 1 tablet twice daily.  Will add as needed suppository and MiraLAX.  She continues with therapy.      Denies CP/SOB/N/V/D. Denies lightheadedness,  dizziness, headaches, vision changes. Patient states they are eating well and staying hydrated. Denies any bowel or bladder issues. Per review of SNF records, Patient is eating 3 meals per day, consuming 75%. Last documented BM 3/6/2025. No concerns from nursing at this time.    The patient's allergies, past medical, surgical, social and family history were reviewed and unchanged.    Review of Systems     Review of Systems   Constitutional:  Positive for activity change. Negative for appetite change and fever.   HENT:  Negative for congestion.    Respiratory:  Negative for cough, shortness of breath and wheezing.    Cardiovascular:  Negative for chest pain and palpitations.   Gastrointestinal:  Positive for constipation. Negative for diarrhea, nausea and vomiting.   Genitourinary:  Negative for difficulty urinating and dysuria.   Musculoskeletal:  Positive for arthralgias and gait problem.   Skin:  Positive for wound.        Right TKR   Neurological:  Positive for weakness.   Psychiatric/Behavioral:  Negative for confusion and sleep disturbance.          Objective     Vitals:   Vitals:    03/05/25 1009   BP: 132/68   Pulse: 83   Resp: 18   Temp: 97.5 °F (36.4 °C)   SpO2: 96%         Physical Exam  Vitals and nursing note reviewed.   Constitutional:       General: She is not in acute distress.     Appearance: Normal appearance. She is not ill-appearing.   HENT:      Head: Normocephalic and atraumatic.   Eyes:      Conjunctiva/sclera: Conjunctivae normal.   Cardiovascular:      Rate and Rhythm: Normal rate and regular rhythm.      Heart sounds: Normal heart sounds.   Pulmonary:      Effort: Pulmonary effort is normal. No respiratory distress.      Breath sounds: Normal breath sounds. No wheezing.   Abdominal:      General: Bowel sounds are normal.   Musculoskeletal:      Right lower leg: Edema present.   Skin:     General: Skin is warm.   Neurological:      Mental Status: She is alert and oriented to person, place,  and time.      Motor: Weakness present.      Gait: Gait abnormal.   Psychiatric:         Mood and Affect: Mood normal.         Behavior: Behavior normal.         Pertinent Laboratory/Diagnostic Studies:   Reviewed in facility chart-stable  3/5/25           CBC NO DIFF         HEMOGLOBIN 8.4  L 11.5 - 14.5 g/dL       HEMATOCRIT 26.2  L 35.0 - 43.0 %       WBC 9.8 4.0 - 10.0 thou/cmm       RBC 3.04  L 3.70 - 4.70 mill/cmm       PLATELET COUNT 334 140 - 350 thou/cmm       MPV 7.7 7.5 - 11.3 fL       MCV 86 80 - 100 fL       MCH 27.7 26.0 - 34.0 pg       MCHC 32.0 32.0 - 37.0 g/dL       RDW 15.1 12.0 - 16.0 %         BASIC METABOLIC PNL       GLUCOSE 82 65 - 99 mg/dL       BUN 49  H 7 - 25 mg/dL       CREATININE 1.91  H 0.40 - 1.10 mg/dL       SODIUM 139 135 - 145 mmol/L       POTASSIUM 5.1 3.5 - 5.2 mmol/L       CHLORIDE 106 100 - 109 mmol/L       CARBON DIOXIDE 22 21 - 31 mmol/L       CALCIUM 9.3 8.5 - 10.5 mg/dL       ANION GAP 11 3 - 11        eGFRcr 27  L >59           Current Medications   Medications reviewed and updated see facility MAR for details.      Current Outpatient Medications:     apixaban (ELIQUIS) 2.5 mg, Take 2.5 mg by mouth 2 (two) times a day, Disp: , Rfl:     aspirin (ECOTRIN LOW STRENGTH) 81 mg EC tablet, Take 1 tablet (81 mg total) by mouth daily Can resume after completing 35 days of Aspirin 325mg BID on 11/02/24, Disp: , Rfl:     aspirin 325 mg tablet, Take 1 tablet (325 mg total) by mouth 2 (two) times a day with meals, Disp: , Rfl:     cefadroxil (DURICEF) 500 mg capsule, Take 500 mg by mouth every 12 (twelve) hours, Disp: , Rfl:     Cholecalciferol (Vitamin D3) 50 MCG (2000 UT) capsule, Take 2,000 Units by mouth daily, Disp: , Rfl:     Empagliflozin (Jardiance) 10 MG TABS tablet, Take 1 tablet (10 mg total) by mouth every morning Hold if sugar <100, Disp: 30 tablet, Rfl: 0    glimepiride (AMARYL) 2 mg tablet, Take 2 mg by mouth 2 (two) times a day, Disp: , Rfl:     oxybutynin (DITROPAN) 5  "mg tablet, Take 5 mg by mouth in the morning, Disp: , Rfl:     senna-docusate sodium (SENOKOT-S) 8.6-50 mg per tablet, Take 1 tablet by mouth daily, Disp: , Rfl:      Please note:  Voice-recognition software may have been used in the preparation of this document.  Occasional wrong word or \"sound-alike\" substitutions may have occurred due to the inherent limitations of voice recognition software.  Interpretation should be guided by context.         ASHLEY Yee  3/6/2025  12:36 PM    "

## 2025-03-06 NOTE — ASSESSMENT & PLAN NOTE
Lab Results   Component Value Date    HGBA1C 7.4 (H) 12/12/2024   Continue Amaryl 2 mg twice daily  Continue Farxiga 5 mg daily (auto substituted for home dose of Jardiance while at rehab)  Encourage diabetic diet  Avoid hypoglycemia  Monitor A1c

## 2025-03-06 NOTE — ASSESSMENT & PLAN NOTE
Patient with history of decreased range of motion  Failed conservative treatment  S/p right total knee arthroplasty 2/27/2025  Currently on Eliquis 2.5 mg twice daily  Outpatient follow-up with Ortho  Continue Tylenol, oxycodone for pain management  Continue PT/OT  Maintain fall/safety precautions.

## 2025-03-06 NOTE — ASSESSMENT & PLAN NOTE
Multifactorial in the setting of right knee replacement, chronic medical conditions  Continue PT/OT  Fall Precautions  Ensure adequate nutrition/hydration   Monitor CBC/BMP    following for d/c planning

## 2025-03-06 NOTE — ASSESSMENT & PLAN NOTE
Lab Results   Component Value Date    EGFR 24 (L) 02/28/2025    EGFR 25 (L) 02/27/2025    EGFR 27 (L) 02/04/2025    CREATININE 2.08 (H) 02/28/2025    CREATININE 2 (H) 02/27/2025    CREATININE 1.91 (H) 02/04/2025   Baseline creatinine 2.0  3/5/2025 CR 1.91/BUN 49/GFR 27  Avoid nephrotoxins  Encourage adequate p.o. hydration  Monitor kidney functions

## 2025-03-11 ENCOUNTER — NURSING HOME VISIT (OUTPATIENT)
Dept: GERIATRICS | Facility: OTHER | Age: 75
End: 2025-03-11
Payer: COMMERCIAL

## 2025-03-11 VITALS
RESPIRATION RATE: 18 BRPM | WEIGHT: 196 LBS | OXYGEN SATURATION: 98 % | BODY MASS INDEX: 35.85 KG/M2 | TEMPERATURE: 98.1 F | SYSTOLIC BLOOD PRESSURE: 141 MMHG | HEART RATE: 83 BPM | DIASTOLIC BLOOD PRESSURE: 68 MMHG

## 2025-03-11 DIAGNOSIS — E11.9 TYPE 2 DIABETES MELLITUS WITHOUT COMPLICATION, WITHOUT LONG-TERM CURRENT USE OF INSULIN (HCC): ICD-10-CM

## 2025-03-11 DIAGNOSIS — Z96.651 STATUS POST RIGHT KNEE REPLACEMENT: Primary | ICD-10-CM

## 2025-03-11 DIAGNOSIS — N18.4 STAGE 4 CHRONIC KIDNEY DISEASE (HCC): ICD-10-CM

## 2025-03-11 DIAGNOSIS — I10 PRIMARY HYPERTENSION: ICD-10-CM

## 2025-03-11 DIAGNOSIS — R26.2 AMBULATORY DYSFUNCTION: ICD-10-CM

## 2025-03-11 PROCEDURE — 99309 SBSQ NF CARE MODERATE MDM 30: CPT

## 2025-03-11 NOTE — ASSESSMENT & PLAN NOTE
Multifactorial in the setting of right knee replacement, chronic medical conditions  Continue PT/OT  Fall Precautions  Ensure adequate nutrition/hydration   Monitor CBC/BMP    following for d/c planning     Patient has been identified as eligible for the Care Transitions Program. Inpatient Care Manager to email Care Transitions Nurse An Gunn at [email or web address]. If any questions, please call [phone] or Tie Line  when patient is ready for discharge.  Thank You

## 2025-03-11 NOTE — PROGRESS NOTES
[facility]  [address] [address] [phone]  [facility]  Code 31 (STR)        NAME: La Garcia  AGE: 74 y.o. SEX: female CODE STATUS: CPR    DATE OF ENCOUNTER: 3/11/2025    Assessment and Plan     1. Status post right knee replacement  Assessment & Plan:  Patient with history of decreased range of motion  Failed conservative treatment  S/p right total knee arthroplasty 2/27/2025  Currently on Eliquis 2.5 mg twice daily  Outpatient follow-up with Ortho  Dressing removed, appears to be healing well  Continue Tylenol, oxycodone for pain management  Continue PT/OT  Maintain fall/safety precautions.  2. Primary hypertension  Assessment & Plan:  BP stable, 141/68  Continue to monitor BP off medications  3. Type 2 diabetes mellitus without complication, without long-term current use of insulin (HCC)  Assessment & Plan:    Lab Results   Component Value Date    HGBA1C 7.4 (H) 12/12/2024   Continue Amaryl 2 mg twice daily  Continue Farxiga 5 mg daily (auto substituted for home dose of Jardiance while at rehab)  Encourage diabetic diet  Avoid hypoglycemia  Monitor A1c  4. Stage 4 chronic kidney disease (HCC)  Assessment & Plan:  Lab Results   Component Value Date    EGFR 24 (L) 02/28/2025    EGFR 25 (L) 02/27/2025    EGFR 27 (L) 02/04/2025    CREATININE 2.08 (H) 02/28/2025    CREATININE 2 (H) 02/27/2025    CREATININE 1.91 (H) 02/04/2025   Baseline creatinine 2.0  3/5/2025 CR 1.91/BUN 49/GFR 27  Avoid nephrotoxins  Encourage adequate p.o. hydration  Monitor kidney functions  5. Ambulatory dysfunction  Assessment & Plan:  Multifactorial in the setting of right knee replacement, chronic medical conditions  Continue PT/OT  Fall Precautions  Ensure adequate nutrition/hydration   Monitor CBC/BMP    following for d/c planning         All medications and routine orders were reviewed and updated as needed.    Chief Complaint     STR follow up visit  Patient's care was  coordinated with nursing facility staff. Recent vitals, labs, and updated medications were review on [facility] system in facility.  Past Medical and Surgical History      Past Medical History:   Diagnosis Date    Arthritis     Diabetes mellitus (HCC)     Hypertension     Mixed hyperlipidemia 09/23/2024    Renal disorder      Past Surgical History:   Procedure Laterality Date    APPENDECTOMY      CHOLECYSTECTOMY      HERNIA REPAIR      [place] OPTX FEM SHFT FX W/INSJ IMED IMPLT W/WO SCREW Right 9/24/2024    Procedure: INSERTION NAIL IM FEMUR ANTEGRADE (TROCHANTERIC)- right;  Surgeon: John Peters DO;  Location:  MAIN OR;  Service: Orthopedics     No Known Allergies       History of Present Illness     HPI  La Garcia is a 74 year old female, she is a STR patient of [facility] since 3/1/25. Past Medical Hx including but not limited to HTN, DM2, OA, CKD4, JEREMÍAS. She was seen in collaboration with nursing for medical mgmt and STR follow up.     Hospital Course  Patient was admitted to the hospital 2/27/25. Patient has had decrease ROM, increased crepitus.  Given failure of conservative measurements patient underwent total right knee arthroplasty on 2/27/2025.  Patient had uncomplicated hospital stay.  She was recommended for rehab and [facility].    Rehab course  La was seen and examined at bedside today.  Patient is alert and oriented x 3.  On exam patient is resting in bed.  She is not in any distress.  Right knee healing well, no S/S of infection.  She reports that physical therapy has been going well.  Continue Tylenol and oxycodone for pain control.  She is hoping to go home soon.  Denies CP/SOB/N/V/D. Denies lightheadedness, dizziness, headaches, vision changes. Patient states they are eating well and staying hydrated. Denies any bowel or bladder issues. Per review of SNF records, Patient is eating 3 meals per day, consuming 75%. Last documented BM 3/11/2025. No  concerns from nursing at this time.    The patient's allergies, past medical, surgical, social and family history were reviewed and unchanged.    Review of Systems     Review of Systems   Constitutional:  Positive for activity change. Negative for appetite change and fever.   HENT:  Negative for congestion.    Respiratory:  Negative for cough, shortness of breath and wheezing.    Cardiovascular:  Negative for chest pain and palpitations.   Gastrointestinal:  Negative for constipation, diarrhea, nausea and vomiting.   Genitourinary:  Negative for difficulty urinating and dysuria.   Musculoskeletal:  Positive for arthralgias and gait problem.   Skin:  Positive for wound.        Right TKR   Neurological:  Positive for weakness.   Psychiatric/Behavioral:  Negative for confusion and sleep disturbance.          Objective     Vitals:   Vitals:    03/11/25 1148   BP: 141/68   Pulse: 83   Resp: 18   Temp: 98.1 °F (36.7 °C)   SpO2: 98%         Physical Exam  Vitals and nursing note reviewed.   Constitutional:       General: She is not in acute distress.     Appearance: Normal appearance. She is not ill-appearing.   HENT:      Head: Normocephalic and atraumatic.   Eyes:      Conjunctiva/sclera: Conjunctivae normal.   Cardiovascular:      Rate and Rhythm: Normal rate and regular rhythm.      Heart sounds: Normal heart sounds.   Pulmonary:      Effort: Pulmonary effort is normal. No respiratory distress.      Breath sounds: Normal breath sounds. No wheezing.   Abdominal:      General: Bowel sounds are normal.   Musculoskeletal:      Right lower leg: Edema present.   Skin:     General: Skin is warm.   Neurological:      Mental Status: She is alert and oriented to person, place, and time.      Motor: Weakness present.      Gait: Gait abnormal.   Psychiatric:         Mood and Affect: Mood normal.         Behavior: Behavior normal.         Pertinent Laboratory/Diagnostic Studies:   Reviewed in facility chart-stable  3/5/25          "  CBC NO DIFF         HEMOGLOBIN 8.4  L 11.5 - 14.5 g/dL       HEMATOCRIT 26.2  L 35.0 - 43.0 %       WBC 9.8 4.0 - 10.0 thou/cmm       RBC 3.04  L 3.70 - 4.70 mill/cmm       PLATELET COUNT 334 140 - 350 thou/cmm       MPV 7.7 7.5 - 11.3 fL       MCV 86 80 - 100 fL       MCH 27.7 26.0 - 34.0 pg       MCHC 32.0 32.0 - 37.0 g/dL       RDW 15.1 12.0 - 16.0 %         BASIC METABOLIC PNL       GLUCOSE 82 65 - 99 mg/dL       BUN 49  H 7 - 25 mg/dL       CREATININE 1.91  H 0.40 - 1.10 mg/dL       SODIUM 139 135 - 145 mmol/L       POTASSIUM 5.1 3.5 - 5.2 mmol/L       CHLORIDE 106 100 - 109 mmol/L       CARBON DIOXIDE 22 21 - 31 mmol/L       CALCIUM 9.3 8.5 - 10.5 mg/dL       ANION GAP 11 3 - 11        eGFRcr 27  L >59           Current Medications   Medications reviewed and updated see facility MAR for details.      Current Outpatient Medications:     apixaban (ELIQUIS) 2.5 mg, Take 2.5 mg by mouth 2 (two) times a day, Disp: , Rfl:     aspirin (ECOTRIN LOW STRENGTH) 81 mg EC tablet, Take 1 tablet (81 mg total) by mouth daily Can resume after completing 35 days of Aspirin 325mg BID on 11/02/24, Disp: , Rfl:     aspirin 325 mg tablet, Take 1 tablet (325 mg total) by mouth 2 (two) times a day with meals, Disp: , Rfl:     Cholecalciferol (Vitamin D3) 50 MCG (2000 UT) capsule, Take 2,000 Units by mouth daily, Disp: , Rfl:     Empagliflozin (Jardiance) 10 MG TABS tablet, Take 1 tablet (10 mg total) by mouth every morning Hold if sugar <100, Disp: 30 tablet, Rfl: 0    glimepiride (AMARYL) 2 mg tablet, Take 2 mg by mouth 2 (two) times a day, Disp: , Rfl:     oxybutynin (DITROPAN) 5 mg tablet, Take 5 mg by mouth in the morning, Disp: , Rfl:     senna-docusate sodium (SENOKOT-S) 8.6-50 mg per tablet, Take 1 tablet by mouth daily, Disp: , Rfl:      Please note:  Voice-recognition software may have been used in the preparation of this document.  Occasional wrong word or \"sound-alike\" substitutions may have occurred due to the inherent " limitations of voice recognition software.  Interpretation should be guided by context.         ASHLEY Yee  3/11/2025  3:00 PM

## 2025-03-11 NOTE — ASSESSMENT & PLAN NOTE
Patient with history of decreased range of motion  Failed conservative treatment  S/p right total knee arthroplasty 2/27/2025  Currently on Eliquis 2.5 mg twice daily  Outpatient follow-up with Ortho  Dressing removed, appears to be healing well  Continue Tylenol, oxycodone for pain management  Continue PT/OT  Maintain fall/safety precautions.

## 2025-03-13 ENCOUNTER — NURSING HOME VISIT (OUTPATIENT)
Dept: GERIATRICS | Facility: OTHER | Age: 75
End: 2025-03-13
Payer: COMMERCIAL

## 2025-03-13 VITALS
OXYGEN SATURATION: 96 % | WEIGHT: 196 LBS | HEART RATE: 79 BPM | BODY MASS INDEX: 35.85 KG/M2 | RESPIRATION RATE: 18 BRPM | SYSTOLIC BLOOD PRESSURE: 126 MMHG | DIASTOLIC BLOOD PRESSURE: 65 MMHG | TEMPERATURE: 97.3 F

## 2025-03-13 DIAGNOSIS — E11.9 TYPE 2 DIABETES MELLITUS WITHOUT COMPLICATION, WITHOUT LONG-TERM CURRENT USE OF INSULIN (HCC): Primary | ICD-10-CM

## 2025-03-13 DIAGNOSIS — R26.2 AMBULATORY DYSFUNCTION: ICD-10-CM

## 2025-03-13 DIAGNOSIS — Z96.651 STATUS POST RIGHT KNEE REPLACEMENT: ICD-10-CM

## 2025-03-13 DIAGNOSIS — N18.4 STAGE 4 CHRONIC KIDNEY DISEASE (HCC): ICD-10-CM

## 2025-03-13 DIAGNOSIS — I10 PRIMARY HYPERTENSION: ICD-10-CM

## 2025-03-13 PROCEDURE — 99316 NF DSCHRG MGMT 30 MIN+: CPT

## 2025-03-13 RX ORDER — ASPIRIN 81 MG/1
81 TABLET, CHEWABLE ORAL 2 TIMES DAILY
COMMUNITY
Start: 2025-03-27

## 2025-03-13 RX ORDER — ACETAMINOPHEN 500 MG
1000 TABLET ORAL EVERY 8 HOURS
COMMUNITY

## 2025-03-13 RX ORDER — OXYCODONE HYDROCHLORIDE 5 MG/1
5 TABLET ORAL EVERY 4 HOURS PRN
COMMUNITY
End: 2025-03-13

## 2025-03-13 RX ORDER — FERROUS SULFATE 325(65) MG
325 TABLET ORAL SEE ADMIN INSTRUCTIONS
COMMUNITY

## 2025-03-13 NOTE — PROGRESS NOTES
[facility]  [address] [address]  [phone]  DISCHARGE SUMMARY  Facility: [facility]  Code 31    NAME: La Garcia  AGE: 74 y.o. SEX: female   CODE STATUS: CPR    DATE OF ADMISSION: 3/1/25   DATE OF DISCHARGE: 3/13/25   DISCHARGE DISPOSITION: Stable for discharge to home with family support and [facility] home health PT/OT/SN services.   Reason for Admission: Patient was admitted to [facility] for rehabilitation after hospitalization for right TKR.    Past Medical and Surgical History:   Past Medical History:   Diagnosis Date    Arthritis     Diabetes mellitus (HCC)     Hypertension     Mixed hyperlipidemia 09/23/2024    Renal disorder       Past Surgical History:   Procedure Laterality Date    APPENDECTOMY      CHOLECYSTECTOMY      HERNIA REPAIR      [place] OPTX FEM SHFT FX W/INSJ IMED IMPLT W/WO SCREW Right 9/24/2024    Procedure: INSERTION NAIL IM FEMUR ANTEGRADE (TROCHANTERIC)- right;  Surgeon: John Peters DO;  Location:  MAIN OR;  Service: Orthopedics       Course of stay:   YANY Garcia is a 74 year old female, she is a STR patient of [facility] since 3/1/25. Past Medical Hx including but not limited to HTN, DM2, OA, CKD4, JEREMÍAS. She was seen in collaboration with nursing for medical mgmt and STR follow up.      Hospital Course  Patient was admitted to the hospital 2/27/25. Patient has had decrease ROM, increased crepitus.  Given failure of conservative measurements patient underwent total right knee arthroplasty on 2/27/2025.  Patient had uncomplicated hospital stay.  She was recommended for rehab and discharged to [facility].     Rehab course  La was seen and examined at bedside today.  Patient is alert and oriented x 3.  On exam patient is sitting in her chair.  She is not in any distress.  Right knee healing well, no S/S of infection.  She reports that physical therapy has been going well.  She says her pain is well controlled with the  Tylenol and she does not want to take the oxycodone, will discontinue.  Denies CP/SOB/N/V/D. Denies lightheadedness, dizziness, headaches, vision changes. Patient states they are eating well and staying hydrated. Denies any bowel or bladder issues. Per review of SNF records, Patient is eating 3 meals per day, consuming 75%. Last documented BM 3/11/2025. No concerns from nursing at this time.       During the patients stay at [facility], he received skilled nursing care, PT, OT, social service support, dietician support, and medical management.  Pt scheduled to be discharged on 3/13/2025.  ROS:  Review of Systems   Constitutional:  Positive for activity change. Negative for appetite change and fever.   HENT:  Negative for congestion.    Respiratory:  Negative for cough, shortness of breath and wheezing.    Cardiovascular:  Negative for chest pain and palpitations.   Gastrointestinal:  Negative for constipation, diarrhea, nausea and vomiting.   Genitourinary:  Negative for difficulty urinating and dysuria.   Musculoskeletal:  Positive for arthralgias and gait problem.   Skin:  Positive for wound.        Right TKR   Neurological:  Positive for weakness.   Psychiatric/Behavioral:  Negative for confusion and sleep disturbance.        PHYSICAL EXAM:  VITALS:   Vitals:    03/13/25 0951   BP: 126/65   Pulse: 79   Resp: 18   Temp: (!) 97.3 °F (36.3 °C)   SpO2: 96%        Physical Exam  Vitals and nursing note reviewed.   Constitutional:       General: She is not in acute distress.     Appearance: Normal appearance. She is not ill-appearing.   HENT:      Head: Normocephalic and atraumatic.   Eyes:      Conjunctiva/sclera: Conjunctivae normal.   Cardiovascular:      Rate and Rhythm: Normal rate and regular rhythm.      Heart sounds: Normal heart sounds.   Pulmonary:      Effort: Pulmonary effort is normal. No respiratory distress.      Breath sounds: Normal breath sounds. No wheezing.   Abdominal:      General: Bowel sounds are  normal.   Musculoskeletal:      Right lower leg: Edema present.   Skin:     General: Skin is warm.   Neurological:      Mental Status: She is alert and oriented to person, place, and time.      Motor: Weakness present.      Gait: Gait abnormal.   Psychiatric:         Mood and Affect: Mood normal.         Behavior: Behavior normal.         Admission Diagnoses:   1. Type 2 diabetes mellitus without complication, without long-term current use of insulin ([facility])  Assessment & Plan:    Lab Results   Component Value Date    HGBA1C 7.4 (H) 12/12/2024   Continue Amaryl 2 mg twice daily  Continue Farxiga 5 mg daily (auto substituted for home dose of Jardiance while at rehab)  Encourage diabetic diet  Avoid hypoglycemia  Monitor A1c  2. Ambulatory dysfunction  Assessment & Plan:  Multifactorial in the setting of right knee replacement, chronic medical conditions  Continue PT/OT  Fall Precautions  Ensure adequate nutrition/hydration   Monitor CBC/BMP    following for d/c planning    3. Status post right knee replacement  Assessment & Plan:  Patient with history of decreased range of motion  Failed conservative treatment  S/p right total knee arthroplasty 2/27/2025  Currently on Eliquis 2.5 mg twice daily  Outpatient follow-up with Ortho  Dressing removed, appears to be healing well  Continue Tylenol, oxycodone for pain management  Continue PT/OT  Maintain fall/safety precautions.  4. Primary hypertension  Assessment & Plan:  BP stable, 126/65  Continue to monitor BP off medications  5. Stage 4 chronic kidney disease (HCC)  Assessment & Plan:  Lab Results   Component Value Date    EGFR 24 (L) 02/28/2025    EGFR 25 (L) 02/27/2025    EGFR 27 (L) 02/04/2025    CREATININE 2.08 (H) 02/28/2025    CREATININE 2 (H) 02/27/2025    CREATININE 1.91 (H) 02/04/2025   Baseline creatinine 2.0  3/5/2025 CR 1.91/BUN 49/GFR 27  Avoid nephrotoxins  Encourage adequate p.o. hydration  Monitor kidney functions       Follow-up  Recommendations:    Outpatient Follow up with PCP in the next 2 weeks  Home Health PT/OT/SN services     Labs and testing performed during stay:  3/12/25    CBC NO DIFF         HEMOGLOBIN 8.7  L 11.5 - 14.5 g/dL       HEMATOCRIT 27.1  L 35.0 - 43.0 %       WBC 9.0 4.0 - 10.0 thou/cmm       RBC 3.11  L 3.70 - 4.70 mill/cmm       PLATELET COUNT 597  H 140 - 350 thou/cmm       MPV 7.0  L 7.5 - 11.3 fL       MCV 87 80 - 100 fL       MCH 27.9 26.0 - 34.0 pg       MCHC 32.1 32.0 - 37.0 g/dL       RDW 15.4 12.0 - 16.0 %         BASIC METABOLIC PNL       GLUCOSE 80 65 - 99 mg/dL       BUN 41  H 7 - 25 mg/dL       CREATININE 1.83  H 0.40 - 1.10 mg/dL       SODIUM 141 135 - 145 mmol/L       POTASSIUM 5.2 3.5 - 5.2 mmol/L       CHLORIDE 109 100 - 109 mmol/L       CARBON DIOXIDE 23 21 - 31 mmol/L       CALCIUM 9.7 8.5 - 10.5 mg/dL       ANION GAP 9 3 - 11        eGFRcr 28  L >59           Discharge Medications: See discharge medication list which was reviewed and signed.      Current Outpatient Medications:     acetaminophen (TYLENOL) 500 mg tablet, Take 1,000 mg by mouth every 8 (eight) hours, Disp: , Rfl:     [START ON 3/27/2025] aspirin 81 mg chewable tablet, Chew 81 mg 2 (two) times a day Do not start before March 27, 2025., Disp: , Rfl:     ferrous sulfate 325 (65 Fe) mg tablet, Take 325 mg by mouth see administration instructions Monday, Wednesday, Friday, Disp: , Rfl:     apixaban (ELIQUIS) 2.5 mg, Take 2.5 mg by mouth 2 (two) times a day, Disp: , Rfl:     aspirin (ECOTRIN LOW STRENGTH) 81 mg EC tablet, Take 1 tablet (81 mg total) by mouth daily Can resume after completing 35 days of Aspirin 325mg BID on 11/02/24, Disp: , Rfl:     Cholecalciferol (Vitamin D3) 50 MCG (2000 UT) capsule, Take 2,000 Units by mouth daily, Disp: , Rfl:     Empagliflozin (Jardiance) 10 MG TABS tablet, Take 1 tablet (10 mg total) by mouth every morning Hold if sugar <100, Disp: 30 tablet, Rfl: 0    glimepiride (AMARYL) 2 mg tablet, Take 2 mg by  "mouth 2 (two) times a day, Disp: , Rfl:     oxybutynin (DITROPAN) 5 mg tablet, Take 5 mg by mouth in the morning, Disp: , Rfl:      Discussion with patient/family and further instructions:  -Fall precautions  -Aspiration precautions  -Bleeding precautions  -Monitor for signs/symptoms of infection  -Medication list was reviewed and signed  -DME form was completed    Status at time of discharge: Stable     Billing based on time. Time spent on unit, 40 minutes. Time spent counseling pt on debility/condition, 30 minutes.    Please note:  Voice-recognition software may have been used in the preparation of this document.  Occasional wrong word or \"sound-alike\" substitutions may have occurred due to the inherent limitations of voice recognition software.  Interpretation should be guided by context.        ASHLEY Yee  3/13/2025   "

## 2025-03-13 NOTE — LETTER
March 13, 2025     Deion Grimes MD  [address]  [address]  [facility] [address]    Patient: La Garcia   YOB: 1950   Date of Visit: 3/13/2025       Dear Dr. Grimes:    La Garcia will discharge from [facility] 3/13/25 following completion of rehab. Please arrange for follow up visit.     Sincerely,        ASHLEY Yee        CC: No Recipients    ASHLEY Yee  3/13/2025 10:50 AM  Sign when Signing Visit  [facility]  [address] [address]  [phone]  DISCHARGE SUMMARY  Facility: [facility]  Code 31    NAME: La Garcia  AGE: 74 y.o. SEX: female   CODE STATUS: CPR    DATE OF ADMISSION: 3/1/25   DATE OF DISCHARGE: 3/13/25   DISCHARGE DISPOSITION: Stable for discharge to home with family support and [facility] home health PT/OT/SN services.   Reason for Admission: Patient was admitted to [facility] for rehabilitation after hospitalization for right TKR.    Past Medical and Surgical History:   Past Medical History:   Diagnosis Date   • Arthritis    • Diabetes mellitus (HCC)    • Hypertension    • Mixed hyperlipidemia 09/23/2024   • Renal disorder       Past Surgical History:   Procedure Laterality Date   • APPENDECTOMY     • CHOLECYSTECTOMY     • HERNIA REPAIR     • [place] OPTX FEM SHFT FX W/INSJ IMED IMPLT W/WO SCREW Right 9/24/2024    Procedure: INSERTION NAIL IM FEMUR ANTEGRADE (TROCHANTERIC)- right;  Surgeon: John Peters DO;  Location:  MAIN OR;  Service: Orthopedics       Course of stay:   HPI  La Garcia is a 74 year old female, she is a STR patient of [facility] since 3/1/25. Past Medical Hx including but not limited to HTN, DM2, OA, CKD4, JEREMÍAS. She was seen in collaboration with nursing for medical mgmt and STR follow up.      Hospital Course  Patient was admitted to the hospital 2/27/25. Patient has had decrease ROM, increased crepitus.  Given failure of conservative measurements patient underwent  total right knee arthroplasty on 2/27/2025.  Patient had uncomplicated hospital stay.  She was recommended for rehab and discharged to [facility].     Rehab course  La was seen and examined at bedside today.  Patient is alert and oriented x 3.  On exam patient is sitting in her chair.  She is not in any distress.  Right knee healing well, no S/S of infection.  She reports that physical therapy has been going well.  She says her pain is well controlled with the Tylenol and she does not want to take the oxycodone, will discontinue.  Denies CP/SOB/N/V/D. Denies lightheadedness, dizziness, headaches, vision changes. Patient states they are eating well and staying hydrated. Denies any bowel or bladder issues. Per review of SNF records, Patient is eating 3 meals per day, consuming 75%. Last documented BM 3/11/2025. No concerns from nursing at this time.       During the patients stay at [facility], he received skilled nursing care, PT, OT, social service support, dietician support, and medical management.  Pt scheduled to be discharged on 3/13/2025.  ROS:  Review of Systems   Constitutional:  Positive for activity change. Negative for appetite change and fever.   HENT:  Negative for congestion.    Respiratory:  Negative for cough, shortness of breath and wheezing.    Cardiovascular:  Negative for chest pain and palpitations.   Gastrointestinal:  Negative for constipation, diarrhea, nausea and vomiting.   Genitourinary:  Negative for difficulty urinating and dysuria.   Musculoskeletal:  Positive for arthralgias and gait problem.   Skin:  Positive for wound.        Right TKR   Neurological:  Positive for weakness.   Psychiatric/Behavioral:  Negative for confusion and sleep disturbance.        PHYSICAL EXAM:  VITALS:   Vitals:    03/13/25 0951   BP: 126/65   Pulse: 79   Resp: 18   Temp: (!) 97.3 °F (36.3 °C)   SpO2: 96%        Physical Exam  Vitals and nursing note reviewed.   Constitutional:       General: She is  not in acute distress.     Appearance: Normal appearance. She is not ill-appearing.   HENT:      Head: Normocephalic and atraumatic.   Eyes:      Conjunctiva/sclera: Conjunctivae normal.   Cardiovascular:      Rate and Rhythm: Normal rate and regular rhythm.      Heart sounds: Normal heart sounds.   Pulmonary:      Effort: Pulmonary effort is normal. No respiratory distress.      Breath sounds: Normal breath sounds. No wheezing.   Abdominal:      General: Bowel sounds are normal.   Musculoskeletal:      Right lower leg: Edema present.   Skin:     General: Skin is warm.   Neurological:      Mental Status: She is alert and oriented to person, place, and time.      Motor: Weakness present.      Gait: Gait abnormal.   Psychiatric:         Mood and Affect: Mood normal.         Behavior: Behavior normal.         Admission Diagnoses:   1. Type 2 diabetes mellitus without complication, without long-term current use of insulin ([facility])  Assessment & Plan:    Lab Results   Component Value Date    HGBA1C 7.4 (H) 12/12/2024   Continue Amaryl 2 mg twice daily  Continue Farxiga 5 mg daily (auto substituted for home dose of Jardiance while at rehab)  Encourage diabetic diet  Avoid hypoglycemia  Monitor A1c  2. Ambulatory dysfunction  Assessment & Plan:  Multifactorial in the setting of right knee replacement, chronic medical conditions  Continue PT/OT  Fall Precautions  Ensure adequate nutrition/hydration   Monitor CBC/BMP    following for d/c planning    3. Status post right knee replacement  Assessment & Plan:  Patient with history of decreased range of motion  Failed conservative treatment  S/p right total knee arthroplasty 2/27/2025  Currently on Eliquis 2.5 mg twice daily  Outpatient follow-up with Ortho  Dressing removed, appears to be healing well  Continue Tylenol, oxycodone for pain management  Continue PT/OT  Maintain fall/safety precautions.  4. Primary hypertension  Assessment & Plan:  BP stable,  126/65  Continue to monitor BP off medications  5. Stage 4 chronic kidney disease (HCC)  Assessment & Plan:  Lab Results   Component Value Date    EGFR 24 (L) 02/28/2025    EGFR 25 (L) 02/27/2025    EGFR 27 (L) 02/04/2025    CREATININE 2.08 (H) 02/28/2025    CREATININE 2 (H) 02/27/2025    CREATININE 1.91 (H) 02/04/2025   Baseline creatinine 2.0  3/5/2025 CR 1.91/BUN 49/GFR 27  Avoid nephrotoxins  Encourage adequate p.o. hydration  Monitor kidney functions       Follow-up Recommendations:    Outpatient Follow up with PCP in the next 2 weeks  Home Health PT/OT/SN services     Labs and testing performed during stay:  3/12/25    CBC NO DIFF         HEMOGLOBIN 8.7  L 11.5 - 14.5 g/dL       HEMATOCRIT 27.1  L 35.0 - 43.0 %       WBC 9.0 4.0 - 10.0 thou/cmm       RBC 3.11  L 3.70 - 4.70 mill/cmm       PLATELET COUNT 597  H 140 - 350 thou/cmm       MPV 7.0  L 7.5 - 11.3 fL       MCV 87 80 - 100 fL       MCH 27.9 26.0 - 34.0 pg       MCHC 32.1 32.0 - 37.0 g/dL       RDW 15.4 12.0 - 16.0 %         BASIC METABOLIC PNL       GLUCOSE 80 65 - 99 mg/dL       BUN 41  H 7 - 25 mg/dL       CREATININE 1.83  H 0.40 - 1.10 mg/dL       SODIUM 141 135 - 145 mmol/L       POTASSIUM 5.2 3.5 - 5.2 mmol/L       CHLORIDE 109 100 - 109 mmol/L       CARBON DIOXIDE 23 21 - 31 mmol/L       CALCIUM 9.7 8.5 - 10.5 mg/dL       ANION GAP 9 3 - 11        eGFRcr 28  L >59           Discharge Medications: See discharge medication list which was reviewed and signed.      Current Outpatient Medications:   •  acetaminophen (TYLENOL) 500 mg tablet, Take 1,000 mg by mouth every 8 (eight) hours, Disp: , Rfl:   •  [START ON 3/27/2025] aspirin 81 mg chewable tablet, Chew 81 mg 2 (two) times a day Do not start before March 27, 2025., Disp: , Rfl:   •  ferrous sulfate 325 (65 Fe) mg tablet, Take 325 mg by mouth see administration instructions Monday, Wednesday, Friday, Disp: , Rfl:   •  apixaban (ELIQUIS) 2.5 mg, Take 2.5 mg by mouth 2 (two) times a day, Disp: ,  "Rfl:   •  aspirin (ECOTRIN LOW STRENGTH) 81 mg EC tablet, Take 1 tablet (81 mg total) by mouth daily Can resume after completing 35 days of Aspirin 325mg BID on 11/02/24, Disp: , Rfl:   •  Cholecalciferol (Vitamin D3) 50 MCG (2000 UT) capsule, Take 2,000 Units by mouth daily, Disp: , Rfl:   •  Empagliflozin (Jardiance) 10 MG TABS tablet, Take 1 tablet (10 mg total) by mouth every morning Hold if sugar <100, Disp: 30 tablet, Rfl: 0  •  glimepiride (AMARYL) 2 mg tablet, Take 2 mg by mouth 2 (two) times a day, Disp: , Rfl:   •  oxybutynin (DITROPAN) 5 mg tablet, Take 5 mg by mouth in the morning, Disp: , Rfl:      Discussion with patient/family and further instructions:  -Fall precautions  -Aspiration precautions  -Bleeding precautions  -Monitor for signs/symptoms of infection  -Medication list was reviewed and signed  -DME form was completed    Status at time of discharge: Stable     Billing based on time. Time spent on unit, 40 minutes. Time spent counseling pt on debility/condition, 30 minutes.    Please note:  Voice-recognition software may have been used in the preparation of this document.  Occasional wrong word or \"sound-alike\" substitutions may have occurred due to the inherent limitations of voice recognition software.  Interpretation should be guided by context.        ASHLEY Yee  3/13/2025     "

## 2025-04-23 ENCOUNTER — TELEPHONE (OUTPATIENT)
Age: 75
End: 2025-04-23

## 2025-04-23 NOTE — TELEPHONE ENCOUNTER
Called patient. No answer.Left voicemail to call back.  She will need to be seen in the office as hse has not been seen since December. Will need new xrays etc if pain worsening. If patient calls back please schedule apt for her.

## 2025-04-23 NOTE — TELEPHONE ENCOUNTER
Caller: La    Doctor: Dr. Peters    Reason for call: had sx 09/24/24 balta was placed Patient states she is having extreme pain where the balta is and now in the lower back.  She feels that this pain is all the time and has been taking Tylenol with codeine every 6 hours.  Offered a visit patient declined because she is worried about cost.  Wants to know what else she can do or if there is a pain med that can be given.             Call back#: [phone]

## 2025-04-24 ENCOUNTER — OFFICE VISIT (OUTPATIENT)
Dept: OBGYN CLINIC | Facility: CLINIC | Age: 75
End: 2025-04-24
Payer: COMMERCIAL

## 2025-04-24 ENCOUNTER — HOSPITAL ENCOUNTER (OUTPATIENT)
Dept: RADIOLOGY | Facility: HOSPITAL | Age: 75
End: 2025-04-24
Attending: ORTHOPAEDIC SURGERY
Payer: COMMERCIAL

## 2025-04-24 ENCOUNTER — TELEPHONE (OUTPATIENT)
Age: 75
End: 2025-04-24

## 2025-04-24 VITALS — BODY MASS INDEX: 34.89 KG/M2 | WEIGHT: 189.6 LBS | HEIGHT: 62 IN

## 2025-04-24 DIAGNOSIS — T84.84XA PAINFUL ORTHOPAEDIC HARDWARE (HCC): ICD-10-CM

## 2025-04-24 DIAGNOSIS — Z47.1 AFTERCARE FOLLOWING OTHER JOINT REPLACEMENT SURGERY: ICD-10-CM

## 2025-04-24 DIAGNOSIS — Z47.1 AFTERCARE FOLLOWING OTHER JOINT REPLACEMENT SURGERY: Primary | ICD-10-CM

## 2025-04-24 DIAGNOSIS — Z96.698 AFTERCARE FOLLOWING OTHER JOINT REPLACEMENT SURGERY: Primary | ICD-10-CM

## 2025-04-24 DIAGNOSIS — Z96.698 AFTERCARE FOLLOWING OTHER JOINT REPLACEMENT SURGERY: ICD-10-CM

## 2025-04-24 PROCEDURE — 99213 OFFICE O/P EST LOW 20 MIN: CPT | Performed by: ORTHOPAEDIC SURGERY

## 2025-04-24 PROCEDURE — 73502 X-RAY EXAM HIP UNI 2-3 VIEWS: CPT

## 2025-04-24 RX ORDER — ACETAMINOPHEN AND CODEINE PHOSPHATE 300; 60 MG/1; MG/1
1 TABLET ORAL EVERY 6 HOURS PRN
COMMUNITY

## 2025-04-24 NOTE — TELEPHONE ENCOUNTER
Caller: Patient    Doctor and/or Office: Dr. Peters    Reason:  Patient is requesting help with getting Disk with xrays to take to another Dr. // Patient states she just left the office // Patient was upset and crying on the phone // Please advise and thank you    CB#: [phone]

## 2025-04-24 NOTE — TELEPHONE ENCOUNTER
Spoke to patient and provided her with the Medical Record Department phone number for them to assist her in obtaining a disc and reports for a future doctor's office visit.

## 2025-04-24 NOTE — PROGRESS NOTES
[facility] [address] [address] [phone]       La Garcia  [id number]  1950    ORTHOPAEDIC SURGERY OUTPATIENT NOTE  4/24/2025    :  Assessment & Plan  Aftercare following Insertion Nail IM Femur Antegrade (Trochanteric) - Right    Orders:    XR hip/pelv 2-3 vws right if performed; Future    Ambulatory Referral to Orthopedic Surgery; Future  Patient is 7 months status post right short TFN for IT fracture, DOS 9/24/24.  X-ray right hip was obtained and reviewed in the office today demonstrates failed inter troch IM nail with cutting out the acetabular wall  Patient has pain with weightbearing right lower back and right thigh region   Will be referring patient to Dr. Covington to discuss surgery for conversion total hip arthroplasty vs hemiarthroplasty   Follow up PRN   Painful orthopaedic hardware (HCC)    Orders:    Ambulatory Referral to Orthopedic Surgery; Future           HISTORY:  74 y.o. female  presents today 7 months status post right short TFN for IT fracture, DOS 9/24/24. Patient is complaining of right thigh pain and right sided lower back pain.  Patient states she is having difficulty walking due to the pain in the thigh.  She states she is using a walker for assistance when ambulating but is present in a walker today.  Patient also states her son had bilateral below the knee amputation recently and travels to [place] 3 days a week to visit him.    Patient is status post right total knee arthroplasty performed by Dr. Dilshad Valladares at [facility] on 2/21/25.     Past Medical History:   Diagnosis Date    Arthritis     Diabetes mellitus (HCC)     Hypertension     Mixed hyperlipidemia 09/23/2024    Renal disorder        Past Surgical History:   Procedure Laterality Date    APPENDECTOMY      CHOLECYSTECTOMY      HERNIA REPAIR      [place] OPTX FEM SHFT FX W/INSJ IMED IMPLT W/WO SCREW Right 9/24/2024     Procedure: INSERTION NAIL IM FEMUR ANTEGRADE (TROCHANTERIC)- right;  Surgeon: John Peters DO;  Location:  MAIN OR;  Service: Orthopedics       Social History     Socioeconomic History    Marital status:      Spouse name: Not on file    Number of children: Not on file    Years of education: Not on file    Highest education level: Not on file   Occupational History    Not on file   Tobacco Use    Smoking status: Never    Smokeless tobacco: Never   Vaping Use    Vaping status: Never Used   Substance and Sexual Activity    Alcohol use: Not Currently    Drug use: Not Currently    Sexual activity: Not on file   Other Topics Concern    Not on file   Social History Narrative    Not on file     Social Drivers of Health     Financial Resource Strain: Not At Risk (2/27/2025)    Received from [facility]    Financial Insecurity     In the last 12 months did you skip medications to save money?: No     In the last 12 months was there a time when you needed to see a doctor but could not because of cost?: No   Food Insecurity: No Food Insecurity (2/27/2025)    Received from [facility]    Food Insecurity     In the last 12 months did you ever eat less than you felt you should because there wasn't enough money for food?: No   Transportation Needs: Unmet Transportation Needs (2/27/2025)    Received from [facility]    Transportation Needs     In the last 12 months have you ever had to go without healthcare because you didn't have a way to get there?: Yes   Physical Activity: Not on file   Stress: Not on file   Social Connections: Socially Integrated (2/27/2025)    Received from [facility]    Social Connection     Do you often feel lonely?: No   Intimate Partner Violence: Not At Risk (6/12/2024)    [facility] from [facility], [facility]    Humiliation, Afraid, Rape, and Kick questionnaire     Fear of Current or Ex-Partner:  "No     Emotionally Abused: No     Physically Abused: No     Sexually Abused: No   Housing Stability: Not At Risk (2/27/2025)    Received from [facility]    Housing Stability     Are you worried that in the next 2 months you may not have stable housing?: No       History reviewed. No pertinent family history.     Patient's Medications   New Prescriptions    No medications on file   Previous Medications    ACETAMINOPHEN (TYLENOL) 500 MG TABLET    Take 1,000 mg by mouth every 8 (eight) hours    ACETAMINOPHEN-CODEINE (TYLENOL WITH CODEINE #4) 300-60 MG PER TABLET    Take 1 tablet by mouth every 6 (six) hours as needed for moderate pain Pt. Stated that the medication is prescribed through Dr. Deion Grimes her medical Doctor for Diabetes    ASPIRIN (ECOTRIN LOW STRENGTH) 81 MG EC TABLET    Take 1 tablet (81 mg total) by mouth daily Can resume after completing 35 days of Aspirin 325mg BID on 11/02/24    ASPIRIN 81 MG CHEWABLE TABLET    Chew 81 mg 2 (two) times a day Do not start before March 27, 2025.    CHOLECALCIFEROL (VITAMIN D3) 50 MCG (2000 UT) CAPSULE    Take 2,000 Units by mouth daily    EMPAGLIFLOZIN (JARDIANCE) 10 MG TABS TABLET    Take 1 tablet (10 mg total) by mouth every morning Hold if sugar <100    FERROUS SULFATE 325 (65 FE) MG TABLET    Take 325 mg by mouth see administration instructions Monday, Wednesday, Friday    GLIMEPIRIDE (AMARYL) 2 MG TABLET    Take 2 mg by mouth 2 (two) times a day    OXYBUTYNIN (DITROPAN) 5 MG TABLET    Take 5 mg by mouth in the morning   Modified Medications    No medications on file   Discontinued Medications    No medications on file       No Known Allergies     Ht 5' 2\" (1.575 m)   Wt 86 kg (189 lb 9.6 oz)   BMI 34.68 kg/m²      REVIEW OF SYSTEMS:  Constitutional: Negative.    HEENT: Negative.    Respiratory: Negative.    Skin: Negative.    Neurological: Negative.    Psychiatric/Behavioral: Negative.  Musculoskeletal: Negative except for that mentioned " in the HPI.    Gen: No acute distress, resting comfortably in bed  HEENT: Eyes clear, moist mucus membranes, hearing intact  Respiratory: No audible wheezing or stridor  Cardiovascular: Well Perfused peripherally, 2+ distal pulse  Abdomen: nondistended, no peritoneal signs     PHYSICAL EXAM:    Right hip   Surgical incision well-healed  No erythema  Positive Stinchfield  Tenderness over greater trochanter       IMAGING:    X-ray right hip demonstrates failed inter troch IM nail with cutting out the acetabular wall    Scribe Attestation      I,:  Carlos Riley MA am acting as a scribe while in the presence of the attending physician.:       I,:  Olinda Garvin MD personally performed the services described in this documentation    as scribed in my presence.:

## (undated) DEVICE — PAD CAST 4 IN COTTON NON STERILE

## (undated) DEVICE — DRAPE C-ARMOUR

## (undated) DEVICE — PACK MAJOR ORTHO W/SPLITS PBDS

## (undated) DEVICE — GLOVE INDICATOR PI UNDERGLOVE SZ 8 BLUE

## (undated) DEVICE — K-WIRE
Type: IMPLANTABLE DEVICE | Site: FEMUR | Status: NON-FUNCTIONAL
Brand: GAMMA
Removed: 2024-09-24

## (undated) DEVICE — K-WIRE, STERILE
Type: IMPLANTABLE DEVICE | Site: FEMUR | Status: NON-FUNCTIONAL
Removed: 2024-09-24

## (undated) DEVICE — PROXIMATE SKIN STAPLERS (35 WIDE) CONTAINS 35 STAINLESS STEEL STAPLES (FIXED HEAD): Brand: PROXIMATE

## (undated) DEVICE — LIGHT HANDLE COVER SLEEVE DISP BLUE STELLAR

## (undated) DEVICE — 6617 IOBAN II PATIENT ISOLATION DRAPE 5/BX,4BX/CS: Brand: STERI-DRAPE™ IOBAN™ 2

## (undated) DEVICE — DRESSING MEPILEX AG BORDER POST-OP 4 X 6 IN

## (undated) DEVICE — DRILL, AO, STERILE

## (undated) DEVICE — ANTIBACTERIAL UNDYED BRAIDED (POLYGLACTIN 910), SYNTHETIC ABSORBABLE SUTURE: Brand: COATED VICRYL

## (undated) DEVICE — ARTHROSCOPY FLOOR MAT

## (undated) DEVICE — INTENDED FOR TISSUE SEPARATION, AND OTHER PROCEDURES THAT REQUIRE A SHARP SURGICAL BLADE TO PUNCTURE OR CUT.: Brand: BARD-PARKER SAFETY BLADES SIZE 10, STERILE

## (undated) DEVICE — SUT VICRYL 0 CT-1 27 IN J340H

## (undated) DEVICE — COBAN 4 IN STERILE

## (undated) DEVICE — GLOVE SRG BIOGEL 8

## (undated) DEVICE — HEAVY DUTY TABLE COVER: Brand: CONVERTORS

## (undated) DEVICE — CURITY NON-ADHERENT STRIPS: Brand: CURITY

## (undated) DEVICE — CLOSED TUBE CLIP: Brand: GAMMA

## (undated) DEVICE — ACE WRAP 6 IN UNSTERILE

## (undated) DEVICE — GLOVE SRG BIOGEL 7.5

## (undated) RX ORDER — BROMFENAC SODIUM 0.7 MG/ML: 1 SOLUTION/ DROPS OPHTHALMIC ONCE A DAY